# Patient Record
Sex: FEMALE | Race: WHITE | NOT HISPANIC OR LATINO | URBAN - METROPOLITAN AREA
[De-identification: names, ages, dates, MRNs, and addresses within clinical notes are randomized per-mention and may not be internally consistent; named-entity substitution may affect disease eponyms.]

---

## 2021-06-24 ENCOUNTER — INPATIENT (INPATIENT)
Facility: HOSPITAL | Age: 71
LOS: 13 days | Discharge: NURSING, REHAB OR LONG-TERM CARE | DRG: 308 | End: 2021-07-08
Attending: INTERNAL MEDICINE | Admitting: INTERNAL MEDICINE
Payer: MEDICARE

## 2021-06-24 VITALS
HEART RATE: 152 BPM | WEIGHT: 179.9 LBS | RESPIRATION RATE: 18 BRPM | HEIGHT: 69 IN | OXYGEN SATURATION: 98 % | SYSTOLIC BLOOD PRESSURE: 138 MMHG | DIASTOLIC BLOOD PRESSURE: 83 MMHG | TEMPERATURE: 98 F

## 2021-06-24 DIAGNOSIS — Z98.89 OTHER SPECIFIED POSTPROCEDURAL STATES: Chronic | ICD-10-CM

## 2021-06-24 DIAGNOSIS — I48.91 UNSPECIFIED ATRIAL FIBRILLATION: ICD-10-CM

## 2021-06-24 LAB
ALBUMIN SERPL ELPH-MCNC: 3.2 G/DL — LOW (ref 3.3–5)
ALP SERPL-CCNC: 106 U/L — SIGNIFICANT CHANGE UP (ref 40–120)
ALT FLD-CCNC: 27 U/L — SIGNIFICANT CHANGE UP (ref 12–78)
ANION GAP SERPL CALC-SCNC: 15 MMOL/L — SIGNIFICANT CHANGE UP (ref 5–17)
APPEARANCE UR: CLEAR — SIGNIFICANT CHANGE UP
AST SERPL-CCNC: 62 U/L — HIGH (ref 15–37)
BACTERIA # UR AUTO: ABNORMAL
BASOPHILS # BLD AUTO: 0.05 K/UL — SIGNIFICANT CHANGE UP (ref 0–0.2)
BASOPHILS NFR BLD AUTO: 0.5 % — SIGNIFICANT CHANGE UP (ref 0–2)
BILIRUB SERPL-MCNC: 2.4 MG/DL — HIGH (ref 0.2–1.2)
BILIRUB UR-MCNC: NEGATIVE — SIGNIFICANT CHANGE UP
BUN SERPL-MCNC: 11 MG/DL — SIGNIFICANT CHANGE UP (ref 7–23)
CALCIUM SERPL-MCNC: 8.3 MG/DL — LOW (ref 8.5–10.1)
CHLORIDE SERPL-SCNC: 101 MMOL/L — SIGNIFICANT CHANGE UP (ref 96–108)
CK MB CFR SERPL CALC: <1 NG/ML — SIGNIFICANT CHANGE UP (ref 0–3.6)
CO2 SERPL-SCNC: 24 MMOL/L — SIGNIFICANT CHANGE UP (ref 22–31)
COLOR SPEC: YELLOW — SIGNIFICANT CHANGE UP
CREAT SERPL-MCNC: 0.84 MG/DL — SIGNIFICANT CHANGE UP (ref 0.5–1.3)
DIFF PNL FLD: ABNORMAL
EOSINOPHIL # BLD AUTO: 0.01 K/UL — SIGNIFICANT CHANGE UP (ref 0–0.5)
EOSINOPHIL NFR BLD AUTO: 0.1 % — SIGNIFICANT CHANGE UP (ref 0–6)
EPI CELLS # UR: SIGNIFICANT CHANGE UP
GLUCOSE SERPL-MCNC: 124 MG/DL — HIGH (ref 70–99)
GLUCOSE UR QL: NEGATIVE — SIGNIFICANT CHANGE UP
HCT VFR BLD CALC: 39.6 % — SIGNIFICANT CHANGE UP (ref 34.5–45)
HGB BLD-MCNC: 13.1 G/DL — SIGNIFICANT CHANGE UP (ref 11.5–15.5)
IMM GRANULOCYTES NFR BLD AUTO: 1.5 % — SIGNIFICANT CHANGE UP (ref 0–1.5)
KETONES UR-MCNC: ABNORMAL
LEUKOCYTE ESTERASE UR-ACNC: NEGATIVE — SIGNIFICANT CHANGE UP
LYMPHOCYTES # BLD AUTO: 0.76 K/UL — LOW (ref 1–3.3)
LYMPHOCYTES # BLD AUTO: 8 % — LOW (ref 13–44)
MAGNESIUM SERPL-MCNC: 1.6 MG/DL — SIGNIFICANT CHANGE UP (ref 1.6–2.6)
MCHC RBC-ENTMCNC: 32.7 PG — SIGNIFICANT CHANGE UP (ref 27–34)
MCHC RBC-ENTMCNC: 33.1 GM/DL — SIGNIFICANT CHANGE UP (ref 32–36)
MCV RBC AUTO: 98.8 FL — SIGNIFICANT CHANGE UP (ref 80–100)
MONOCYTES # BLD AUTO: 0.66 K/UL — SIGNIFICANT CHANGE UP (ref 0–0.9)
MONOCYTES NFR BLD AUTO: 6.9 % — SIGNIFICANT CHANGE UP (ref 2–14)
NEUTROPHILS # BLD AUTO: 7.89 K/UL — HIGH (ref 1.8–7.4)
NEUTROPHILS NFR BLD AUTO: 83 % — HIGH (ref 43–77)
NITRITE UR-MCNC: NEGATIVE — SIGNIFICANT CHANGE UP
NRBC # BLD: 0 /100 WBCS — SIGNIFICANT CHANGE UP (ref 0–0)
NT-PROBNP SERPL-SCNC: 869 PG/ML — HIGH (ref 0–125)
PH UR: 7 — SIGNIFICANT CHANGE UP (ref 5–8)
PLATELET # BLD AUTO: 214 K/UL — SIGNIFICANT CHANGE UP (ref 150–400)
POTASSIUM SERPL-MCNC: 3.3 MMOL/L — LOW (ref 3.5–5.3)
POTASSIUM SERPL-SCNC: 3.3 MMOL/L — LOW (ref 3.5–5.3)
PROT SERPL-MCNC: 7.3 G/DL — SIGNIFICANT CHANGE UP (ref 6–8.3)
PROT UR-MCNC: NEGATIVE — SIGNIFICANT CHANGE UP
RBC # BLD: 4.01 M/UL — SIGNIFICANT CHANGE UP (ref 3.8–5.2)
RBC # FLD: 15.5 % — HIGH (ref 10.3–14.5)
RBC CASTS # UR COMP ASSIST: ABNORMAL /HPF (ref 0–4)
SARS-COV-2 RNA SPEC QL NAA+PROBE: SIGNIFICANT CHANGE UP
SODIUM SERPL-SCNC: 140 MMOL/L — SIGNIFICANT CHANGE UP (ref 135–145)
SP GR SPEC: 1.01 — SIGNIFICANT CHANGE UP (ref 1.01–1.02)
TROPONIN I SERPL-MCNC: <.015 NG/ML — SIGNIFICANT CHANGE UP (ref 0.01–0.04)
TSH SERPL-MCNC: 2.81 UIU/ML — SIGNIFICANT CHANGE UP (ref 0.36–3.74)
UROBILINOGEN FLD QL: NEGATIVE — SIGNIFICANT CHANGE UP
WBC # BLD: 9.51 K/UL — SIGNIFICANT CHANGE UP (ref 3.8–10.5)
WBC # FLD AUTO: 9.51 K/UL — SIGNIFICANT CHANGE UP (ref 3.8–10.5)
WBC UR QL: SIGNIFICANT CHANGE UP /HPF (ref 0–5)

## 2021-06-24 PROCEDURE — 71045 X-RAY EXAM CHEST 1 VIEW: CPT | Mod: 26

## 2021-06-24 PROCEDURE — 72125 CT NECK SPINE W/O DYE: CPT | Mod: 26,MA

## 2021-06-24 PROCEDURE — 93010 ELECTROCARDIOGRAM REPORT: CPT

## 2021-06-24 PROCEDURE — 71250 CT THORAX DX C-: CPT | Mod: 26,MA

## 2021-06-24 PROCEDURE — 99285 EMERGENCY DEPT VISIT HI MDM: CPT

## 2021-06-24 PROCEDURE — 70486 CT MAXILLOFACIAL W/O DYE: CPT | Mod: 26,MA

## 2021-06-24 PROCEDURE — 70450 CT HEAD/BRAIN W/O DYE: CPT | Mod: 26,MA

## 2021-06-24 RX ORDER — DILTIAZEM HCL 120 MG
15 CAPSULE, EXT RELEASE 24 HR ORAL
Qty: 125 | Refills: 0 | Status: DISCONTINUED | OUTPATIENT
Start: 2021-06-24 | End: 2021-06-25

## 2021-06-24 RX ORDER — DILTIAZEM HCL 120 MG
10 CAPSULE, EXT RELEASE 24 HR ORAL ONCE
Refills: 0 | Status: COMPLETED | OUTPATIENT
Start: 2021-06-24 | End: 2021-06-24

## 2021-06-24 RX ORDER — DILTIAZEM HCL 120 MG
10 CAPSULE, EXT RELEASE 24 HR ORAL
Qty: 125 | Refills: 0 | Status: DISCONTINUED | OUTPATIENT
Start: 2021-06-24 | End: 2021-06-24

## 2021-06-24 RX ORDER — METOPROLOL TARTRATE 50 MG
25 TABLET ORAL EVERY 6 HOURS
Refills: 0 | Status: DISCONTINUED | OUTPATIENT
Start: 2021-06-24 | End: 2021-06-25

## 2021-06-24 RX ORDER — FUROSEMIDE 40 MG
40 TABLET ORAL ONCE
Refills: 0 | Status: COMPLETED | OUTPATIENT
Start: 2021-06-24 | End: 2021-06-24

## 2021-06-24 RX ORDER — DILTIAZEM HCL 120 MG
20 CAPSULE, EXT RELEASE 24 HR ORAL
Qty: 125 | Refills: 0 | Status: DISCONTINUED | OUTPATIENT
Start: 2021-06-24 | End: 2021-06-24

## 2021-06-24 RX ORDER — RIVAROXABAN 15 MG-20MG
20 KIT ORAL
Refills: 0 | Status: DISCONTINUED | OUTPATIENT
Start: 2021-06-24 | End: 2021-07-01

## 2021-06-24 RX ORDER — POTASSIUM CHLORIDE 20 MEQ
40 PACKET (EA) ORAL ONCE
Refills: 0 | Status: COMPLETED | OUTPATIENT
Start: 2021-06-24 | End: 2021-06-24

## 2021-06-24 RX ADMIN — RIVAROXABAN 20 MILLIGRAM(S): KIT at 22:40

## 2021-06-24 RX ADMIN — Medication 10 MG/HR: at 18:33

## 2021-06-24 RX ADMIN — Medication 40 MILLIGRAM(S): at 18:45

## 2021-06-24 RX ADMIN — Medication 15 MG/HR: at 22:16

## 2021-06-24 RX ADMIN — Medication 10 MILLIGRAM(S): at 19:33

## 2021-06-24 RX ADMIN — Medication 10 MILLIGRAM(S): at 18:29

## 2021-06-24 RX ADMIN — Medication 10 MILLIGRAM(S): at 18:51

## 2021-06-24 RX ADMIN — Medication 25 MILLIGRAM(S): at 20:56

## 2021-06-24 RX ADMIN — Medication 20 MG/HR: at 21:25

## 2021-06-24 RX ADMIN — Medication 40 MILLIEQUIVALENT(S): at 20:56

## 2021-06-24 NOTE — CONSULT NOTE ADULT - ASSESSMENT
The patient is a 68 year old female with a history of HTN, HL, DVT, lymphedema, hemochromatosis who was sent in from PMD with rapid atrial fibrillation.     Plan:  - Cardiac enzymes negative  - BNP not significantly elevated at 869  - TSH normal  - Received diltiazem 20 mg IV and started on diltiazem drip with minimal improvement  - Continue diltiazem drip at 10 mg/hr, can increase to 15 mg/hr if remains tachycardic >130  - Start metoprolol tartrate 25 mg q6h  - Continue rivaroxaban 20 mg daily  - Clarify remainder of home medications  - Hold furosemide for now  - Replete K  - Check echo The patient is a 68 year old female with a history of HTN, HL, DVT, lymphedema, hemochromatosis who was sent in from PMD with rapid atrial fibrillation.     Plan:  - Cardiac enzymes negative  - BNP not significantly elevated at 869  - TSH normal  - Received diltiazem 20 mg IV and started on diltiazem drip with minimal improvement  - Continue diltiazem drip at 20 mg/hr  - Start metoprolol tartrate 25 mg q6h  - If she remains tachycardic, can give intermittent pushes of metoprolol or diltiazem  - Continue rivaroxaban 20 mg daily  - Clarify remainder of home medications  - Hold furosemide for now  - Replete K  - Check echo

## 2021-06-24 NOTE — ED PROVIDER NOTE - CLINICAL SUMMARY MEDICAL DECISION MAKING FREE TEXT BOX
new onset rapid afib, f/u labs, chest xray, rate control, call cardio, head trauma on xarelto f/u ct head, admit

## 2021-06-24 NOTE — ED ADULT NURSE NOTE - OBJECTIVE STATEMENT
Pt from home s/p fall two days ago, pt has bilateral black eyes and bruise to left frontal forehead, pt went to PMD and was sent to ED for new onset rapid afib, pt is obese, Bilateral feet and LL = pedal edema +2 with vascular congestion, redness, scaly skin, pt started on cardizem drip, lab work sent awaiting results

## 2021-06-24 NOTE — ED PROVIDER NOTE - OBJECTIVE STATEMENT
71 female sent to ER from Dr. Darcie Sotomayor office with report of new onset rapid afib. Patient states she has been feeling dizziness, nausea for more than a month, and today had appointment to see Dr. Sotomayor. Patient states 2 days ago she was fixing her pill box and leaned forward and hit head on counter, no LOC, no syncope. Patient has chronic lymphedema of bilateral lower extremites, has had history of DVT of lower extremity on xarelto.

## 2021-06-24 NOTE — ED ADULT NURSE REASSESSMENT NOTE - NS ED NURSE REASSESS COMMENT FT1
Received report from Karma HENAO. Patient awake and resting in bed. IVP Cardizem not given yet and to be given. MD Nelson aware of current HR of 155. As per MD Nelson patient may go to CT Head on cardiac monitor with RN bedside. Safety and comfort measures provided and maintained. Friend bedside.

## 2021-06-24 NOTE — ED ADULT NURSE NOTE - PMH
CHF (congestive heart failure)    Hemochromatosis    HLD (hyperlipidemia)    HTN (hypertension)    HTN (hypertension), benign    Pulmonary embolism

## 2021-06-25 DIAGNOSIS — I48.91 UNSPECIFIED ATRIAL FIBRILLATION: ICD-10-CM

## 2021-06-25 DIAGNOSIS — I26.99 OTHER PULMONARY EMBOLISM WITHOUT ACUTE COR PULMONALE: ICD-10-CM

## 2021-06-25 DIAGNOSIS — E87.6 HYPOKALEMIA: ICD-10-CM

## 2021-06-25 DIAGNOSIS — I10 ESSENTIAL (PRIMARY) HYPERTENSION: ICD-10-CM

## 2021-06-25 DIAGNOSIS — E78.5 HYPERLIPIDEMIA, UNSPECIFIED: ICD-10-CM

## 2021-06-25 LAB
ANION GAP SERPL CALC-SCNC: 14 MMOL/L — SIGNIFICANT CHANGE UP (ref 5–17)
BUN SERPL-MCNC: 10 MG/DL — SIGNIFICANT CHANGE UP (ref 7–23)
CALCIUM SERPL-MCNC: 7.8 MG/DL — LOW (ref 8.5–10.1)
CHLORIDE SERPL-SCNC: 103 MMOL/L — SIGNIFICANT CHANGE UP (ref 96–108)
CK SERPL-CCNC: 162 U/L — SIGNIFICANT CHANGE UP (ref 26–192)
CO2 SERPL-SCNC: 27 MMOL/L — SIGNIFICANT CHANGE UP (ref 22–31)
CREAT SERPL-MCNC: 0.61 MG/DL — SIGNIFICANT CHANGE UP (ref 0.5–1.3)
GLUCOSE SERPL-MCNC: 96 MG/DL — SIGNIFICANT CHANGE UP (ref 70–99)
HCT VFR BLD CALC: 32.8 % — LOW (ref 34.5–45)
HGB BLD-MCNC: 11.1 G/DL — LOW (ref 11.5–15.5)
MAGNESIUM SERPL-MCNC: 1.5 MG/DL — LOW (ref 1.6–2.6)
MCHC RBC-ENTMCNC: 33.8 GM/DL — SIGNIFICANT CHANGE UP (ref 32–36)
MCHC RBC-ENTMCNC: 33.9 PG — SIGNIFICANT CHANGE UP (ref 27–34)
MCV RBC AUTO: 100.3 FL — HIGH (ref 80–100)
NRBC # BLD: 0 /100 WBCS — SIGNIFICANT CHANGE UP (ref 0–0)
PLATELET # BLD AUTO: 190 K/UL — SIGNIFICANT CHANGE UP (ref 150–400)
POTASSIUM SERPL-MCNC: 2.5 MMOL/L — CRITICAL LOW (ref 3.5–5.3)
POTASSIUM SERPL-SCNC: 2.5 MMOL/L — CRITICAL LOW (ref 3.5–5.3)
RBC # BLD: 3.27 M/UL — LOW (ref 3.8–5.2)
RBC # FLD: 15.6 % — HIGH (ref 10.3–14.5)
SODIUM SERPL-SCNC: 144 MMOL/L — SIGNIFICANT CHANGE UP (ref 135–145)
TROPONIN I SERPL-MCNC: <.015 NG/ML — SIGNIFICANT CHANGE UP (ref 0.01–0.04)
WBC # BLD: 6.12 K/UL — SIGNIFICANT CHANGE UP (ref 3.8–10.5)
WBC # FLD AUTO: 6.12 K/UL — SIGNIFICANT CHANGE UP (ref 3.8–10.5)

## 2021-06-25 RX ORDER — DILTIAZEM HCL 120 MG
5 CAPSULE, EXT RELEASE 24 HR ORAL
Qty: 125 | Refills: 0 | Status: DISCONTINUED | OUTPATIENT
Start: 2021-06-25 | End: 2021-06-25

## 2021-06-25 RX ORDER — POTASSIUM CHLORIDE 20 MEQ
40 PACKET (EA) ORAL EVERY 4 HOURS
Refills: 0 | Status: COMPLETED | OUTPATIENT
Start: 2021-06-25 | End: 2021-06-25

## 2021-06-25 RX ORDER — METOPROLOL TARTRATE 50 MG
100 TABLET ORAL
Refills: 0 | Status: DISCONTINUED | OUTPATIENT
Start: 2021-06-26 | End: 2021-07-08

## 2021-06-25 RX ORDER — POTASSIUM CHLORIDE 20 MEQ
10 PACKET (EA) ORAL
Refills: 0 | Status: COMPLETED | OUTPATIENT
Start: 2021-06-25 | End: 2021-06-25

## 2021-06-25 RX ORDER — RIVAROXABAN 15 MG-20MG
1 KIT ORAL
Qty: 0 | Refills: 0 | DISCHARGE

## 2021-06-25 RX ORDER — DILTIAZEM HCL 120 MG
10 CAPSULE, EXT RELEASE 24 HR ORAL
Qty: 125 | Refills: 0 | Status: DISCONTINUED | OUTPATIENT
Start: 2021-06-25 | End: 2021-06-25

## 2021-06-25 RX ORDER — METOPROLOL TARTRATE 50 MG
50 TABLET ORAL ONCE
Refills: 0 | Status: COMPLETED | OUTPATIENT
Start: 2021-06-25 | End: 2021-06-25

## 2021-06-25 RX ORDER — DILTIAZEM HCL 120 MG
120 CAPSULE, EXT RELEASE 24 HR ORAL DAILY
Refills: 0 | Status: DISCONTINUED | OUTPATIENT
Start: 2021-06-25 | End: 2021-06-26

## 2021-06-25 RX ORDER — MAGNESIUM SULFATE 500 MG/ML
2 VIAL (ML) INJECTION ONCE
Refills: 0 | Status: COMPLETED | OUTPATIENT
Start: 2021-06-25 | End: 2021-06-25

## 2021-06-25 RX ORDER — METOPROLOL TARTRATE 50 MG
50 TABLET ORAL
Refills: 0 | Status: DISCONTINUED | OUTPATIENT
Start: 2021-06-25 | End: 2021-06-25

## 2021-06-25 RX ADMIN — Medication 50 MILLIGRAM(S): at 07:50

## 2021-06-25 RX ADMIN — Medication 120 MILLIGRAM(S): at 07:50

## 2021-06-25 RX ADMIN — Medication 50 MILLIGRAM(S): at 17:24

## 2021-06-25 RX ADMIN — Medication 25 MILLIGRAM(S): at 06:14

## 2021-06-25 RX ADMIN — Medication 5 MG/HR: at 02:41

## 2021-06-25 RX ADMIN — Medication 40 MILLIEQUIVALENT(S): at 09:19

## 2021-06-25 RX ADMIN — Medication 25 MILLIGRAM(S): at 00:40

## 2021-06-25 RX ADMIN — Medication 100 MILLIEQUIVALENT(S): at 08:38

## 2021-06-25 RX ADMIN — Medication 100 MILLIEQUIVALENT(S): at 12:03

## 2021-06-25 RX ADMIN — Medication 50 GRAM(S): at 13:59

## 2021-06-25 RX ADMIN — Medication 40 MILLIEQUIVALENT(S): at 13:28

## 2021-06-25 RX ADMIN — Medication 50 MILLIGRAM(S): at 19:22

## 2021-06-25 RX ADMIN — Medication 10 MG/HR: at 02:17

## 2021-06-25 RX ADMIN — RIVAROXABAN 20 MILLIGRAM(S): KIT at 17:24

## 2021-06-25 RX ADMIN — Medication 100 MILLIEQUIVALENT(S): at 10:01

## 2021-06-25 NOTE — H&P ADULT - HISTORY OF PRESENT ILLNESS
The patient is a 68 year old female with a history of HTN, HL, DVT, lymphedema, hemochromatosis who was sent in from [facility] with rapid atrial fibrillation. She states over the last couple of months she has felt lightheaded and nauseous at times. No palpitations, chest pain, shortness of breath. She is able to complete exercises at home. She has not seen her cardiologist Dr. Gonzalez in over a year. No recent cardiac testing.

## 2021-06-25 NOTE — H&P ADULT - NSICDXPASTMEDICALHX_GEN_ALL_CORE_FT
PAST MEDICAL HISTORY:  CHF (congestive heart failure)     Hemochromatosis     HLD (hyperlipidemia)     HTN (hypertension)     HTN (hypertension), benign     Pulmonary embolism

## 2021-06-25 NOTE — PROGRESS NOTE ADULT - SUBJECTIVE AND OBJECTIVE BOX
Chief Complaint: Tachycardia    Interval Events: Heart rates improved overnight. Patient has no complaints.    Review of Systems:  General: No fevers, chills, weight loss or gain  Skin: No rashes, color changes  Cardiovascular: No chest pain, orthopnea  Respiratory: No shortness of breath, cough  Gastrointestinal: No nausea, abdominal pain  Genitourinary: No incontinence, pain with urination  Musculoskeletal: No pain, swelling, decreased range of motion  Neurological: No headache, weakness  Psychiatric: No depression, anxiety  Endocrine: No weight loss or gain, increased thirst  All other systems are comprehensively negative.    Physical Exam:  Vitals:        Vital Signs Last 24 Hrs  T(C): 36.9 (25 Jun 2021 04:15), Max: 37.1 (24 Jun 2021 23:21)  T(F): 98.4 (25 Jun 2021 04:15), Max: 98.8 (24 Jun 2021 23:21)  HR: 82 (25 Jun 2021 04:15) (82 - 164)  BP: 137/83 (25 Jun 2021 04:15) (94/65 - 143/92)  BP(mean): --  RR: 18 (25 Jun 2021 04:15) (18 - 20)  SpO2: 98% (25 Jun 2021 04:15) (98% - 100%)  General: NAD  HEENT: MMM  Neck: No JVD, no carotid bruit  Lungs: CTAB  CV: Irregular, nl S1/S2, no M/R/G  Abdomen: S/NT/ND, +BS  Extremities: No LE edema, no cyanosis  Neuro: AAOx3, non-focal  Skin: No rash    Labs:                        11.1   6.12  )-----------( 190      ( 25 Jun 2021 06:59 )             32.8     06-24    140  |  101  |  11  ----------------------------<  124<H>  3.3<L>   |  24  |  0.84    Ca    8.3<L>      24 Jun 2021 18:23  Mg     1.6     06-24    TPro  7.3  /  Alb  3.2<L>  /  TBili  2.4<H>  /  DBili  x   /  AST  62<H>  /  ALT  27  /  AlkPhos  106  06-24    CARDIAC MARKERS ( 24 Jun 2021 18:23 )  <.015 ng/mL / x     / x     / x     / <1.0 ng/mL          Telemetry:

## 2021-06-25 NOTE — PROVIDER CONTACT NOTE (OTHER) - SITUATION
Pt admitted with a fib, was on cardizem drip this am, d/cd and started on po cardizem and po metoprolol.  Heart rate increasing to 120's to 130's.  Pt denies any c/o chest pain or SOB, denies

## 2021-06-25 NOTE — H&P ADULT - NSICDXFAMILYHX_GEN_ALL_CORE_FT
FAMILY HISTORY:  Father  Still living? Unknown  Family history of colon cancer, Age at diagnosis: Age Unknown    Mother  Still living? Unknown  Family history of cerebrovascular accident (CVA), Age at diagnosis: Age Unknown    Sibling  Still living? Unknown  Family history of hemochromatosis, Age at diagnosis: Age Unknown

## 2021-06-25 NOTE — H&P ADULT - PROBLEM SELECTOR PLAN 1
Admit to telemetry  Patient titrated of iv cardizem gtt this morning to po cardizem  Started on po metoprolol  Check ECHO  Continue Xarelto  Cardio consult  Further work-up/management pending clinical course.

## 2021-06-25 NOTE — H&P ADULT - NSICDXPASTSURGICALHX_GEN_ALL_CORE_FT
PAST SURGICAL HISTORY:  No significant past surgical history     S/P D&C (status post dilation and curettage) with IUD insertion in 2013

## 2021-06-25 NOTE — PROVIDER CONTACT NOTE (CRITICAL VALUE NOTIFICATION) - ASSESSMENT
Pt is alert and oriented, skin warm and dry.  cardiac monitor a fib.  Pt ws on cardizem drip, bethanycd and now on po cardizem and metoprolol

## 2021-06-26 DIAGNOSIS — N39.0 URINARY TRACT INFECTION, SITE NOT SPECIFIED: ICD-10-CM

## 2021-06-26 DIAGNOSIS — R41.82 ALTERED MENTAL STATUS, UNSPECIFIED: ICD-10-CM

## 2021-06-26 LAB
ALBUMIN SERPL ELPH-MCNC: 3.2 G/DL — LOW (ref 3.3–5)
ALP SERPL-CCNC: 97 U/L — SIGNIFICANT CHANGE UP (ref 40–120)
ALT FLD-CCNC: 23 U/L — SIGNIFICANT CHANGE UP (ref 12–78)
ANION GAP SERPL CALC-SCNC: 10 MMOL/L — SIGNIFICANT CHANGE UP (ref 5–17)
ANION GAP SERPL CALC-SCNC: 9 MMOL/L — SIGNIFICANT CHANGE UP (ref 5–17)
AST SERPL-CCNC: 37 U/L — SIGNIFICANT CHANGE UP (ref 15–37)
BILIRUB DIRECT SERPL-MCNC: 0.3 MG/DL — HIGH (ref 0.05–0.2)
BILIRUB INDIRECT FLD-MCNC: 0.4 MG/DL — SIGNIFICANT CHANGE UP (ref 0.2–1)
BILIRUB SERPL-MCNC: 0.7 MG/DL — SIGNIFICANT CHANGE UP (ref 0.2–1.2)
BUN SERPL-MCNC: 11 MG/DL — SIGNIFICANT CHANGE UP (ref 7–23)
BUN SERPL-MCNC: 12 MG/DL — SIGNIFICANT CHANGE UP (ref 7–23)
CALCIUM SERPL-MCNC: 8.2 MG/DL — LOW (ref 8.5–10.1)
CALCIUM SERPL-MCNC: 8.5 MG/DL — SIGNIFICANT CHANGE UP (ref 8.5–10.1)
CHLORIDE SERPL-SCNC: 105 MMOL/L — SIGNIFICANT CHANGE UP (ref 96–108)
CHLORIDE SERPL-SCNC: 106 MMOL/L — SIGNIFICANT CHANGE UP (ref 96–108)
CO2 SERPL-SCNC: 29 MMOL/L — SIGNIFICANT CHANGE UP (ref 22–31)
CO2 SERPL-SCNC: 30 MMOL/L — SIGNIFICANT CHANGE UP (ref 22–31)
COVID-19 SPIKE DOMAIN AB INTERP: NEGATIVE — SIGNIFICANT CHANGE UP
COVID-19 SPIKE DOMAIN ANTIBODY RESULT: 0.4 U/ML — SIGNIFICANT CHANGE UP
CREAT SERPL-MCNC: 0.71 MG/DL — SIGNIFICANT CHANGE UP (ref 0.5–1.3)
CREAT SERPL-MCNC: 0.73 MG/DL — SIGNIFICANT CHANGE UP (ref 0.5–1.3)
ETHANOL SERPL-MCNC: <10 MG/DL — SIGNIFICANT CHANGE UP (ref 0–10)
GLUCOSE SERPL-MCNC: 113 MG/DL — HIGH (ref 70–99)
GLUCOSE SERPL-MCNC: 119 MG/DL — HIGH (ref 70–99)
HCT VFR BLD CALC: 36.5 % — SIGNIFICANT CHANGE UP (ref 34.5–45)
HCV AB S/CO SERPL IA: 0.14 S/CO — SIGNIFICANT CHANGE UP (ref 0–0.99)
HCV AB SERPL-IMP: SIGNIFICANT CHANGE UP
HGB BLD-MCNC: 12 G/DL — SIGNIFICANT CHANGE UP (ref 11.5–15.5)
MAGNESIUM SERPL-MCNC: 1.6 MG/DL — SIGNIFICANT CHANGE UP (ref 1.6–2.6)
MAGNESIUM SERPL-MCNC: 1.7 MG/DL — SIGNIFICANT CHANGE UP (ref 1.6–2.6)
MCHC RBC-ENTMCNC: 32.9 GM/DL — SIGNIFICANT CHANGE UP (ref 32–36)
MCHC RBC-ENTMCNC: 33.5 PG — SIGNIFICANT CHANGE UP (ref 27–34)
MCV RBC AUTO: 102 FL — HIGH (ref 80–100)
NRBC # BLD: 0 /100 WBCS — SIGNIFICANT CHANGE UP (ref 0–0)
PCP SPEC-MCNC: SIGNIFICANT CHANGE UP
PHOSPHATE SERPL-MCNC: 3 MG/DL — SIGNIFICANT CHANGE UP (ref 2.5–4.5)
PLATELET # BLD AUTO: 229 K/UL — SIGNIFICANT CHANGE UP (ref 150–400)
POTASSIUM SERPL-MCNC: 3 MMOL/L — LOW (ref 3.5–5.3)
POTASSIUM SERPL-MCNC: 3.4 MMOL/L — LOW (ref 3.5–5.3)
POTASSIUM SERPL-SCNC: 3 MMOL/L — LOW (ref 3.5–5.3)
POTASSIUM SERPL-SCNC: 3.4 MMOL/L — LOW (ref 3.5–5.3)
PROT SERPL-MCNC: 6.6 G/DL — SIGNIFICANT CHANGE UP (ref 6–8.3)
RBC # BLD: 3.58 M/UL — LOW (ref 3.8–5.2)
RBC # FLD: 16.1 % — HIGH (ref 10.3–14.5)
SARS-COV-2 IGG+IGM SERPL QL IA: 0.4 U/ML — SIGNIFICANT CHANGE UP
SARS-COV-2 IGG+IGM SERPL QL IA: NEGATIVE — SIGNIFICANT CHANGE UP
SODIUM SERPL-SCNC: 144 MMOL/L — SIGNIFICANT CHANGE UP (ref 135–145)
SODIUM SERPL-SCNC: 145 MMOL/L — SIGNIFICANT CHANGE UP (ref 135–145)
WBC # BLD: 7.63 K/UL — SIGNIFICANT CHANGE UP (ref 3.8–10.5)
WBC # FLD AUTO: 7.63 K/UL — SIGNIFICANT CHANGE UP (ref 3.8–10.5)

## 2021-06-26 PROCEDURE — 70450 CT HEAD/BRAIN W/O DYE: CPT | Mod: 26

## 2021-06-26 RX ORDER — FOLIC ACID 0.8 MG
1 TABLET ORAL DAILY
Refills: 0 | Status: DISCONTINUED | OUTPATIENT
Start: 2021-06-26 | End: 2021-07-08

## 2021-06-26 RX ORDER — OLANZAPINE 15 MG/1
2.5 TABLET, FILM COATED ORAL EVERY 6 HOURS
Refills: 0 | Status: DISCONTINUED | OUTPATIENT
Start: 2021-06-26 | End: 2021-06-26

## 2021-06-26 RX ORDER — OLANZAPINE 15 MG/1
2.5 TABLET, FILM COATED ORAL EVERY 6 HOURS
Refills: 0 | Status: DISCONTINUED | OUTPATIENT
Start: 2021-06-26 | End: 2021-06-29

## 2021-06-26 RX ORDER — POTASSIUM CHLORIDE 20 MEQ
40 PACKET (EA) ORAL EVERY 4 HOURS
Refills: 0 | Status: COMPLETED | OUTPATIENT
Start: 2021-06-26 | End: 2021-06-26

## 2021-06-26 RX ORDER — DILTIAZEM HCL 120 MG
120 CAPSULE, EXT RELEASE 24 HR ORAL ONCE
Refills: 0 | Status: COMPLETED | OUTPATIENT
Start: 2021-06-26 | End: 2021-06-26

## 2021-06-26 RX ORDER — DILTIAZEM HCL 120 MG
240 CAPSULE, EXT RELEASE 24 HR ORAL DAILY
Refills: 0 | Status: DISCONTINUED | OUTPATIENT
Start: 2021-06-27 | End: 2021-06-27

## 2021-06-26 RX ORDER — CEFTRIAXONE 500 MG/1
1000 INJECTION, POWDER, FOR SOLUTION INTRAMUSCULAR; INTRAVENOUS EVERY 24 HOURS
Refills: 0 | Status: COMPLETED | OUTPATIENT
Start: 2021-06-27 | End: 2021-06-29

## 2021-06-26 RX ORDER — THIAMINE MONONITRATE (VIT B1) 100 MG
100 TABLET ORAL DAILY
Refills: 0 | Status: COMPLETED | OUTPATIENT
Start: 2021-06-26 | End: 2021-06-29

## 2021-06-26 RX ORDER — CEFTRIAXONE 500 MG/1
INJECTION, POWDER, FOR SOLUTION INTRAMUSCULAR; INTRAVENOUS
Refills: 0 | Status: COMPLETED | OUTPATIENT
Start: 2021-06-26 | End: 2021-06-30

## 2021-06-26 RX ORDER — SOD,AMMONIUM,POTASSIUM LACTATE
1 CREAM (GRAM) TOPICAL
Refills: 0 | Status: DISCONTINUED | OUTPATIENT
Start: 2021-06-26 | End: 2021-07-08

## 2021-06-26 RX ORDER — CEFTRIAXONE 500 MG/1
1000 INJECTION, POWDER, FOR SOLUTION INTRAMUSCULAR; INTRAVENOUS ONCE
Refills: 0 | Status: COMPLETED | OUTPATIENT
Start: 2021-06-26 | End: 2021-06-26

## 2021-06-26 RX ORDER — DILTIAZEM HCL 120 MG
10 CAPSULE, EXT RELEASE 24 HR ORAL ONCE
Refills: 0 | Status: COMPLETED | OUTPATIENT
Start: 2021-06-26 | End: 2021-06-26

## 2021-06-26 RX ADMIN — Medication 100 MILLIGRAM(S): at 19:00

## 2021-06-26 RX ADMIN — Medication 40 MILLIEQUIVALENT(S): at 14:39

## 2021-06-26 RX ADMIN — Medication 120 MILLIGRAM(S): at 10:23

## 2021-06-26 RX ADMIN — RIVAROXABAN 20 MILLIGRAM(S): KIT at 19:00

## 2021-06-26 RX ADMIN — Medication 10 MILLIGRAM(S): at 14:39

## 2021-06-26 RX ADMIN — CEFTRIAXONE 100 MILLIGRAM(S): 500 INJECTION, POWDER, FOR SOLUTION INTRAMUSCULAR; INTRAVENOUS at 09:54

## 2021-06-26 RX ADMIN — Medication 120 MILLIGRAM(S): at 05:12

## 2021-06-26 RX ADMIN — Medication 40 MILLIEQUIVALENT(S): at 19:03

## 2021-06-26 RX ADMIN — Medication 100 MILLIGRAM(S): at 05:12

## 2021-06-26 RX ADMIN — Medication 1 MILLIGRAM(S): at 16:56

## 2021-06-26 RX ADMIN — OLANZAPINE 2.5 MILLIGRAM(S): 15 TABLET, FILM COATED ORAL at 23:21

## 2021-06-26 RX ADMIN — Medication 1 APPLICATION(S): at 19:03

## 2021-06-26 NOTE — PROGRESS NOTE ADULT - SUBJECTIVE AND OBJECTIVE BOX
Patient is a 71y old  Female who presents with a chief complaint of Dizzy (2021 10:31)      INTERVAL HPI/OVERNIGHT EVENTS: Patient seen and examined. NAD. Events noted.    Vital Signs Last 24 Hrs  T(C): 36.6 (2021 07:56), Max: 37.2 (2021 00:22)  T(F): 97.9 (2021 07:56), Max: 99 (2021 00:22)  HR: 99 (2021 07:56) (95 - 122)  BP: 133/90 (2021 07:56) (111/74 - 133/90)  BP(mean): --  RR: 18 (2021 07:56) (16 - 18)  SpO2: 95% (2021 07:56) (92% - 96%)        144  |  105  |  11  ----------------------------<  113<H>  3.0<L>   |  30  |  0.71    Ca    8.2<L>      2021 07:23  Mg     1.7         TPro  7.3  /  Alb  3.2<L>  /  TBili  2.4<H>  /  DBili  x   /  AST  62<H>  /  ALT  27  /  AlkPhos  106  -24                          12.0   7.63  )-----------( 229      ( 2021 07:23 )             36.5       CAPILLARY BLOOD GLUCOSE        Urinalysis Basic - ( 2021 19:30 )    Color: Yellow / Appearance: Clear / S.010 / pH: x  Gluc: x / Ketone: Moderate  / Bili: Negative / Urobili: Negative   Blood: x / Protein: Negative / Nitrite: Negative   Leuk Esterase: Negative / RBC: 3-5 /HPF / WBC 8-12 /HPF   Sq Epi: x / Non Sq Epi: Few / Bacteria: Few              cefTRIAXone   IVPB      metoprolol tartrate 100 milliGRAM(s) Oral two times a day  potassium chloride    Tablet ER 40 milliEquivalent(s) Oral every 4 hours  rivaroxaban 20 milliGRAM(s) Oral with dinner              REVIEW OF SYSTEMS:  CONSTITUTIONAL: No fever, no weight loss, or no fatigue  NECK: No pain, no stiffness  RESPIRATORY: No cough, no wheezing, no chills, no hemoptysis, No shortness of breath  CARDIOVASCULAR: No chest pain, no palpitations, no dizziness, no leg swelling  GASTROINTESTINAL: No abdominal pain. No nausea, no vomiting, no hematemesis; No diarrhea, no constipation. No melena, no hematochezia.  GENITOURINARY: No dysuria, no frequency, no hematuria, no incontinence  NEUROLOGICAL: No headaches, no loss of strength, no numbness, no tremors  SKIN: No itching, no burning  MUSCULOSKELETAL: No joint pain, no swelling; No muscle, no back, no extremity pain  PSYCHIATRIC: No depression, no mood swings,   HEME/LYMPH: No easy bruising, no bleeding gums  ALLERY AND IMMUNOLOGIC: No hives       Consultant(s) Notes Reviewed:  [X] YES  [ ] NO    PHYSICAL EXAM:  GENERAL: NAD  HEAD:  Atraumatic, Normocephalic  EYES: EOMI, PERRLA, conjunctiva and sclera clear  ENMT: No tonsillar erythema, exudates, or enlargement; Moist mucous membranes  NECK: Supple, No JVD  NERVOUS SYSTEM:  Awake & alert  CHEST/LUNG: Clear to auscultation bilaterally; No rales, rhonchi, wheezing,  HEART: IRRegular rate and rhythm  ABDOMEN: Soft, Nontender, Nondistended; Bowel sounds present  EXTREMITIES:  No clubbing, cyanosis, or edema  LYMPH: No lymphadenopathy noted  SKIN: No rashes      Advanced care planning discussed with patient/family [X] YES   [ ] NO    Advanced care planning discussed with patient/family. Patient's health status was discussed. All appropriate changes have been made regarding patient's end-of-life care. Advanced care planning forms reviewed/discussed/completed.  20 minutes spent.
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
## 2021-06-26 NOTE — PROGRESS NOTE ADULT - SUBJECTIVE AND OBJECTIVE BOX
Chief Complaint: Tachycardia    Interval Events: Confused overnight.    Review of Systems:  General: No fevers, chills, weight loss or gain  Skin: No rashes, color changes  Cardiovascular: No chest pain, orthopnea  Respiratory: No shortness of breath, cough  Gastrointestinal: No nausea, abdominal pain  Genitourinary: No incontinence, pain with urination  Musculoskeletal: No pain, swelling, decreased range of motion  Neurological: No headache, weakness  Psychiatric: No depression, anxiety  Endocrine: No weight loss or gain, increased thirst  All other systems are comprehensively negative.    Physical Exam:  Vitals:        Vital Signs Last 24 Hrs  T(C): 36.9 (25 Jun 2021 04:15), Max: 37.1 (24 Jun 2021 23:21)  T(F): 98.4 (25 Jun 2021 04:15), Max: 98.8 (24 Jun 2021 23:21)  HR: 82 (25 Jun 2021 04:15) (82 - 164)  BP: 137/83 (25 Jun 2021 04:15) (94/65 - 143/92)  BP(mean): --  RR: 18 (25 Jun 2021 04:15) (18 - 20)  SpO2: 98% (25 Jun 2021 04:15) (98% - 100%)  General: NAD  HEENT: MMM  Neck: No JVD, no carotid bruit  Lungs: CTAB  CV: Irregular, nl S1/S2, no M/R/G  Abdomen: S/NT/ND, +BS  Extremities: No LE edema, no cyanosis  Neuro: AAOx3, non-focal  Skin: No rash    Labs:                        11.1   6.12  )-----------( 190      ( 25 Jun 2021 06:59 )             32.8     06-24    140  |  101  |  11  ----------------------------<  124<H>  3.3<L>   |  24  |  0.84    Ca    8.3<L>      24 Jun 2021 18:23  Mg     1.6     06-24    TPro  7.3  /  Alb  3.2<L>  /  TBili  2.4<H>  /  DBili  x   /  AST  62<H>  /  ALT  27  /  AlkPhos  106  06-24    CARDIAC MARKERS ( 24 Jun 2021 18:23 )  <.015 ng/mL / x     / x     / x     / <1.0 ng/mL          Telemetry: AF 120s

## 2021-06-26 NOTE — PROGRESS NOTE ADULT - PROBLEM SELECTOR PLAN 1
Metoprolol and po cardizem dose increased  ECHO noted  Continue Xarelto  Cardio consult noted  Further work-up/management pending clinical course.

## 2021-06-27 DIAGNOSIS — E83.119 HEMOCHROMATOSIS, UNSPECIFIED: ICD-10-CM

## 2021-06-27 LAB
-  AMIKACIN: SIGNIFICANT CHANGE UP
-  AMOXICILLIN/CLAVULANIC ACID: SIGNIFICANT CHANGE UP
-  AMPICILLIN/SULBACTAM: SIGNIFICANT CHANGE UP
-  AMPICILLIN: SIGNIFICANT CHANGE UP
-  AZTREONAM: SIGNIFICANT CHANGE UP
-  CEFAZOLIN: SIGNIFICANT CHANGE UP
-  CEFEPIME: SIGNIFICANT CHANGE UP
-  CEFOXITIN: SIGNIFICANT CHANGE UP
-  CEFTRIAXONE: SIGNIFICANT CHANGE UP
-  CIPROFLOXACIN: SIGNIFICANT CHANGE UP
-  ERTAPENEM: SIGNIFICANT CHANGE UP
-  GENTAMICIN: SIGNIFICANT CHANGE UP
-  IMIPENEM: SIGNIFICANT CHANGE UP
-  LEVOFLOXACIN: SIGNIFICANT CHANGE UP
-  MEROPENEM: SIGNIFICANT CHANGE UP
-  NITROFURANTOIN: SIGNIFICANT CHANGE UP
-  PIPERACILLIN/TAZOBACTAM: SIGNIFICANT CHANGE UP
-  TIGECYCLINE: SIGNIFICANT CHANGE UP
-  TOBRAMYCIN: SIGNIFICANT CHANGE UP
-  TRIMETHOPRIM/SULFAMETHOXAZOLE: SIGNIFICANT CHANGE UP
AMMONIA BLD-MCNC: 36 UMOL/L — HIGH (ref 11–32)
AMMONIA BLD-MCNC: <17 UMOL/L — SIGNIFICANT CHANGE UP (ref 11–32)
ANION GAP SERPL CALC-SCNC: 9 MMOL/L — SIGNIFICANT CHANGE UP (ref 5–17)
BASE EXCESS BLDV CALC-SCNC: 5.4 MMOL/L — HIGH (ref -2–2)
BLOOD GAS COMMENTS, VENOUS: SIGNIFICANT CHANGE UP
BLOOD GAS COMMENTS, VENOUS: SIGNIFICANT CHANGE UP
BUN SERPL-MCNC: 11 MG/DL — SIGNIFICANT CHANGE UP (ref 7–23)
CALCIUM SERPL-MCNC: 8.4 MG/DL — LOW (ref 8.5–10.1)
CHLORIDE SERPL-SCNC: 108 MMOL/L — SIGNIFICANT CHANGE UP (ref 96–108)
CO2 SERPL-SCNC: 29 MMOL/L — SIGNIFICANT CHANGE UP (ref 22–31)
CREAT SERPL-MCNC: 0.58 MG/DL — SIGNIFICANT CHANGE UP (ref 0.5–1.3)
CULTURE RESULTS: SIGNIFICANT CHANGE UP
FERRITIN SERPL-MCNC: 165 NG/ML — HIGH (ref 15–150)
FOLATE SERPL-MCNC: 4.7 NG/ML — SIGNIFICANT CHANGE UP
GLUCOSE SERPL-MCNC: 98 MG/DL — SIGNIFICANT CHANGE UP (ref 70–99)
HCO3 BLDV-SCNC: 28 MMOL/L — SIGNIFICANT CHANGE UP (ref 21–29)
HCT VFR BLD CALC: 35.4 % — SIGNIFICANT CHANGE UP (ref 34.5–45)
HGB BLD-MCNC: 11.5 G/DL — SIGNIFICANT CHANGE UP (ref 11.5–15.5)
HIV 1+2 AB+HIV1 P24 AG SERPL QL IA: SIGNIFICANT CHANGE UP
HOROWITZ INDEX BLDV+IHG-RTO: 21 — SIGNIFICANT CHANGE UP
MAGNESIUM SERPL-MCNC: 1.7 MG/DL — SIGNIFICANT CHANGE UP (ref 1.6–2.6)
MCHC RBC-ENTMCNC: 32.5 GM/DL — SIGNIFICANT CHANGE UP (ref 32–36)
MCHC RBC-ENTMCNC: 33.4 PG — SIGNIFICANT CHANGE UP (ref 27–34)
MCV RBC AUTO: 102.9 FL — HIGH (ref 80–100)
METHOD TYPE: SIGNIFICANT CHANGE UP
NRBC # BLD: 0 /100 WBCS — SIGNIFICANT CHANGE UP (ref 0–0)
ORGANISM # SPEC MICROSCOPIC CNT: SIGNIFICANT CHANGE UP
ORGANISM # SPEC MICROSCOPIC CNT: SIGNIFICANT CHANGE UP
PCO2 BLDV: 53 MMHG — HIGH (ref 35–50)
PH BLDV: 7.38 — SIGNIFICANT CHANGE UP (ref 7.35–7.45)
PHOSPHATE SERPL-MCNC: 3.3 MG/DL — SIGNIFICANT CHANGE UP (ref 2.5–4.5)
PLATELET # BLD AUTO: 214 K/UL — SIGNIFICANT CHANGE UP (ref 150–400)
PO2 BLDV: 55 MMHG — HIGH (ref 25–45)
POTASSIUM SERPL-MCNC: 3 MMOL/L — LOW (ref 3.5–5.3)
POTASSIUM SERPL-SCNC: 3 MMOL/L — LOW (ref 3.5–5.3)
PROCALCITONIN SERPL-MCNC: 0.06 NG/ML — HIGH (ref 0–0.04)
RBC # BLD: 3.44 M/UL — LOW (ref 3.8–5.2)
RBC # FLD: 16.1 % — HIGH (ref 10.3–14.5)
SAO2 % BLDV: 83 % — SIGNIFICANT CHANGE UP (ref 67–88)
SODIUM SERPL-SCNC: 146 MMOL/L — HIGH (ref 135–145)
SPECIMEN SOURCE: SIGNIFICANT CHANGE UP
TRANSFERRIN SERPL-MCNC: 168 MG/DL — LOW (ref 200–360)
TSH SERPL-MCNC: 3.22 UIU/ML — SIGNIFICANT CHANGE UP (ref 0.36–3.74)
TSH SERPL-MCNC: 4.28 UIU/ML — HIGH (ref 0.36–3.74)
VIT B12 SERPL-MCNC: 639 PG/ML — SIGNIFICANT CHANGE UP (ref 232–1245)
WBC # BLD: 7.49 K/UL — SIGNIFICANT CHANGE UP (ref 3.8–10.5)
WBC # FLD AUTO: 7.49 K/UL — SIGNIFICANT CHANGE UP (ref 3.8–10.5)

## 2021-06-27 RX ORDER — POTASSIUM CHLORIDE 20 MEQ
40 PACKET (EA) ORAL EVERY 4 HOURS
Refills: 0 | Status: COMPLETED | OUTPATIENT
Start: 2021-06-27 | End: 2021-06-27

## 2021-06-27 RX ORDER — DILTIAZEM HCL 120 MG
10 CAPSULE, EXT RELEASE 24 HR ORAL
Qty: 125 | Refills: 0 | Status: DISCONTINUED | OUTPATIENT
Start: 2021-06-27 | End: 2021-06-29

## 2021-06-27 RX ORDER — DILTIAZEM HCL 120 MG
360 CAPSULE, EXT RELEASE 24 HR ORAL DAILY
Refills: 0 | Status: DISCONTINUED | OUTPATIENT
Start: 2021-06-28 | End: 2021-07-08

## 2021-06-27 RX ORDER — POTASSIUM CHLORIDE 20 MEQ
10 PACKET (EA) ORAL
Refills: 0 | Status: COMPLETED | OUTPATIENT
Start: 2021-06-27 | End: 2021-06-27

## 2021-06-27 RX ADMIN — RIVAROXABAN 20 MILLIGRAM(S): KIT at 18:19

## 2021-06-27 RX ADMIN — Medication 1 TABLET(S): at 11:38

## 2021-06-27 RX ADMIN — Medication 1 APPLICATION(S): at 05:03

## 2021-06-27 RX ADMIN — Medication 0.5 MILLIGRAM(S): at 10:46

## 2021-06-27 RX ADMIN — OLANZAPINE 2.5 MILLIGRAM(S): 15 TABLET, FILM COATED ORAL at 11:42

## 2021-06-27 RX ADMIN — Medication 100 MILLIEQUIVALENT(S): at 13:20

## 2021-06-27 RX ADMIN — Medication 1 MILLIGRAM(S): at 04:15

## 2021-06-27 RX ADMIN — OLANZAPINE 2.5 MILLIGRAM(S): 15 TABLET, FILM COATED ORAL at 18:17

## 2021-06-27 RX ADMIN — Medication 100 MILLIGRAM(S): at 18:18

## 2021-06-27 RX ADMIN — CEFTRIAXONE 100 MILLIGRAM(S): 500 INJECTION, POWDER, FOR SOLUTION INTRAMUSCULAR; INTRAVENOUS at 06:56

## 2021-06-27 RX ADMIN — Medication 100 MILLIEQUIVALENT(S): at 10:36

## 2021-06-27 RX ADMIN — Medication 100 MILLIGRAM(S): at 05:04

## 2021-06-27 RX ADMIN — Medication 1 MILLIGRAM(S): at 07:43

## 2021-06-27 RX ADMIN — Medication 1 APPLICATION(S): at 18:18

## 2021-06-27 RX ADMIN — Medication 240 MILLIGRAM(S): at 05:04

## 2021-06-27 RX ADMIN — Medication 40 MILLIEQUIVALENT(S): at 14:24

## 2021-06-27 RX ADMIN — Medication 1 MILLIGRAM(S): at 11:38

## 2021-06-27 RX ADMIN — Medication 10 MG/HR: at 09:00

## 2021-06-27 RX ADMIN — Medication 1 MILLIGRAM(S): at 01:56

## 2021-06-27 RX ADMIN — Medication 0.5 MILLIGRAM(S): at 18:00

## 2021-06-27 RX ADMIN — Medication 100 MILLIGRAM(S): at 11:39

## 2021-06-27 RX ADMIN — OLANZAPINE 2.5 MILLIGRAM(S): 15 TABLET, FILM COATED ORAL at 05:04

## 2021-06-27 RX ADMIN — Medication 40 MILLIEQUIVALENT(S): at 18:18

## 2021-06-27 RX ADMIN — Medication 100 MILLIEQUIVALENT(S): at 11:37
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
## 2021-06-27 NOTE — PROGRESS NOTE ADULT - SUBJECTIVE AND OBJECTIVE BOX
Patient is a 71y old  Female who presents with a chief complaint of Dizzy (27 Jun 2021 10:10)      INTERVAL HPI/OVERNIGHT EVENTS: Patient seen and examined. NAD. No complaints.    Vital Signs Last 24 Hrs  T(C): 36.7 (27 Jun 2021 04:54), Max: 36.9 (26 Jun 2021 18:30)  T(F): 98 (27 Jun 2021 04:54), Max: 98.5 (26 Jun 2021 18:30)  HR: 109 (27 Jun 2021 04:54) (85 - 121)  BP: 143/92 (27 Jun 2021 04:54) (124/85 - 143/92)  BP(mean): --  RR: 17 (27 Jun 2021 04:54) (17 - 18)  SpO2: 95% (27 Jun 2021 04:54) (92% - 97%)    06-27    146<H>  |  108  |  11  ----------------------------<  98  3.0<L>   |  29  |  0.58    Ca    8.4<L>      27 Jun 2021 06:57  Phos  3.3     06-27  Mg     1.7     06-27    TPro  6.6  /  Alb  3.2<L>  /  TBili  0.7  /  DBili  .30<H>  /  AST  37  /  ALT  23  /  AlkPhos  97  06-26                          11.5   7.49  )-----------( 214      ( 27 Jun 2021 06:57 )             35.4       CAPILLARY BLOOD GLUCOSE                  ammonium lactate 12% Lotion 1 Application(s) Topical two times a day  cefTRIAXone   IVPB      cefTRIAXone   IVPB 1000 milliGRAM(s) IV Intermittent every 24 hours  diltiazem Infusion 10 mG/Hr IV Continuous <Continuous>  folic acid 1 milliGRAM(s) Oral daily  LORazepam   Injectable 0.5 milliGRAM(s) IV Push every 4 hours  LORazepam   Injectable 1 milliGRAM(s) IV Push every 2 hours PRN  LORazepam   Injectable 1 milliGRAM(s) IV Push every 1 hour PRN  metoprolol tartrate 100 milliGRAM(s) Oral two times a day  multivitamin 1 Tablet(s) Oral daily  OLANZapine Injectable 2.5 milliGRAM(s) IntraMuscular every 6 hours  potassium chloride    Tablet ER 40 milliEquivalent(s) Oral every 4 hours  potassium chloride  10 mEq/100 mL IVPB 10 milliEquivalent(s) IV Intermittent every 1 hour  rivaroxaban 20 milliGRAM(s) Oral with dinner  thiamine 100 milliGRAM(s) Oral daily              REVIEW OF SYSTEMS:  CONSTITUTIONAL: No fever, no weight loss, or no fatigue  NECK: No pain, no stiffness  RESPIRATORY: No cough, no wheezing, no chills, no hemoptysis, No shortness of breath  CARDIOVASCULAR: No chest pain, no palpitations, no dizziness, no leg swelling  GASTROINTESTINAL: No abdominal pain. No nausea, no vomiting, no hematemesis; No diarrhea, no constipation. No melena, no hematochezia.  GENITOURINARY: No dysuria, no frequency, no hematuria, no incontinence  NEUROLOGICAL: No headaches, no loss of strength, no numbness, no tremors  SKIN: No itching, no burning  MUSCULOSKELETAL: No joint pain, no swelling; No muscle, no back, no extremity pain  PSYCHIATRIC: No depression, no mood swings,   HEME/LYMPH: No easy bruising, no bleeding gums  ALLERY AND IMMUNOLOGIC: No hives       Consultant(s) Notes Reviewed:  [X] YES  [ ] NO    PHYSICAL EXAM:  GENERAL: NAD  HEAD:  Atraumatic, Normocephalic  EYES: EOMI, PERRLA, conjunctiva and sclera clear  ENMT: No tonsillar erythema, exudates, or enlargement; Moist mucous membranes  NECK: Supple, No JVD  NERVOUS SYSTEM:  Awake & alert  CHEST/LUNG: Clear to auscultation bilaterally; No rales, rhonchi, wheezing,  HEART: IRRegular rate and rhythm  ABDOMEN: Soft, Nontender, Nondistended; Bowel sounds present  EXTREMITIES:  No clubbing, cyanosis, or edema  LYMPH: No lymphadenopathy noted  SKIN: No rashes      Advanced care planning discussed with patient/family [X] YES   [ ] NO    Advanced care planning discussed with patient/family. Patient's health status was discussed. All appropriate changes have been made regarding patient's end-of-life care. Advanced care planning forms reviewed/discussed/completed.  20 minutes spent.

## 2021-06-27 NOTE — PROGRESS NOTE ADULT - SUBJECTIVE AND OBJECTIVE BOX
Neurology follow up note    JOSAFAT [id number]      Interval History:    Patient feels ok events noted     MEDICATIONS    ammonium lactate 12% Lotion 1 Application(s) Topical two times a day  cefTRIAXone   IVPB      cefTRIAXone   IVPB 1000 milliGRAM(s) IV Intermittent every 24 hours  diltiazem Infusion 10 mG/Hr IV Continuous <Continuous>  folic acid 1 milliGRAM(s) Oral daily  LORazepam   Injectable 0.5 milliGRAM(s) IV Push every 4 hours  LORazepam   Injectable 1 milliGRAM(s) IV Push every 2 hours PRN  LORazepam   Injectable 1 milliGRAM(s) IV Push every 1 hour PRN  metoprolol tartrate 100 milliGRAM(s) Oral two times a day  multivitamin 1 Tablet(s) Oral daily  OLANZapine Injectable 2.5 milliGRAM(s) IntraMuscular every 6 hours  potassium chloride  10 mEq/100 mL IVPB 10 milliEquivalent(s) IV Intermittent every 1 hour  rivaroxaban 20 milliGRAM(s) Oral with dinner  thiamine 100 milliGRAM(s) Oral daily      Allergies    No Known Allergies    Intolerances            Vital Signs Last 24 Hrs  T(C): 36.7 (27 Jun 2021 04:54), Max: 36.9 (26 Jun 2021 18:30)  T(F): 98 (27 Jun 2021 04:54), Max: 98.5 (26 Jun 2021 18:30)  HR: 109 (27 Jun 2021 04:54) (85 - 121)  BP: 143/92 (27 Jun 2021 04:54) (124/85 - 143/92)  BP(mean): --  RR: 17 (27 Jun 2021 04:54) (17 - 18)  SpO2: 95% (27 Jun 2021 04:54) (92% - 97%)      REVIEW OF SYSTEMS:  Constitutional:  The patient denies fever, chills, or night sweats.  Head:  At present, no headaches or lightheadedness.  Eyes:  No double vision, but does have blurry vision, suspect she thinks she has macular degeneration.  Ears:  No ringing in ears.  Neck:  No neck pain.  Respiratory:  No shortness of breath.  Cardiovascular:  No chest pain.  Abdomen:  No nausea, vomiting, or abdominal pain.  Extremities/Neurological:  Has numbness and tingling of her feet, but states she has herniated disc in her back.  This is not new.  Musculoskeletal:  Occasional joint pain, possibly from arthritis.    PHYSICAL EXAMINATION:  HEENT:  Head:  Positive trauma was noted over the left eye.  No scleral icterus was noted.  Ears:  Hearing bilaterally intact.  NECK:  Supple.  RESPIRATORY:  Good air entry bilaterally.  CARDIOVASCULAR:  S1 and S2 heard.  ABDOMEN:  Soft and nontender.  EXTREMITIES:  No clubbing was noted.      NEUROLOGIC:  The patient is awake and alert.  Location was hospital, year was 2021, month would repeat 2021 She was able to follow simple and complex commands, took right hand touch left ear.  Extraocular movements were intact, full visual field.  Speech was fluent.  Smile was symmetric.  Motor:  Bilateral upper were 4/5, bilateral lower were 3-/5.   patient on and off with answering questions             LABS:  CBC Full  -  ( 27 Jun 2021 06:57 )  WBC Count : 7.49 K/uL  RBC Count : 3.44 M/uL  Hemoglobin : 11.5 g/dL  Hematocrit : 35.4 %  Platelet Count - Automated : 214 K/uL  Mean Cell Volume : 102.9 fl  Mean Cell Hemoglobin : 33.4 pg  Mean Cell Hemoglobin Concentration : 32.5 gm/dL  Auto Neutrophil # : x  Auto Lymphocyte # : x  Auto Monocyte # : x  Auto Eosinophil # : x  Auto Basophil # : x  Auto Neutrophil % : x  Auto Lymphocyte % : x  Auto Monocyte % : x  Auto Eosinophil % : x  Auto Basophil % : x      06-27    146<H>  |  108  |  11  ----------------------------<  98  3.0<L>   |  29  |  0.58    Ca    8.4<L>      27 Jun 2021 06:57  Phos  3.3     06-27  Mg     1.7     06-27    TPro  6.6  /  Alb  3.2<L>  /  TBili  0.7  /  DBili  .30<H>  /  AST  37  /  ALT  23  /  AlkPhos  97  06-26    Hemoglobin A1C:     LIVER FUNCTIONS - ( 26 Jun 2021 19:45 )  Alb: 3.2 g/dL / Pro: 6.6 g/dL / ALK PHOS: 97 U/L / ALT: 23 U/L / AST: 37 U/L / GGT: x           Vitamin B12         RADIOLOGY      ANALYSIS AND PLAN:  This is a 71-year-old with an episode of altered mental status.  For episode of altered mental status, as per my conversation with the healthcare proxy, it appears that the patient's house is in disarray.  She does not bathe.  Questionable, the patient has an underlying mild cognitive impairment that is progressively becoming worse with hospital setting leading to the hospital delirium, suspect less likely this is a primary CNS event.    For history of atrial fibrillation, anticoagulation as needed.  For history of hypertension, monitor systolic blood pressure.  For history of hyperlipidemia, condition the patient on statin.  Fall precautions.  I would recommend Psychiatry follow-up in regards to the capacity to make decisions for the patient.    Spoke with the healthcare proxy, Tamiko, at [phone]. past 2 months has refused people to enter her house       Greater than 45 minutes of time was spent with the patient, plan of care, reviewing data, with greater than 50% of the visit was spent counseling and/or coordinating care with multidisciplinary healthcare team   Neurology follow up note    JOSAFAT [id number]      Interval History:    Patient feels ok events noted     MEDICATIONS    ammonium lactate 12% Lotion 1 Application(s) Topical two times a day  cefTRIAXone   IVPB      cefTRIAXone   IVPB 1000 milliGRAM(s) IV Intermittent every 24 hours  diltiazem Infusion 10 mG/Hr IV Continuous <Continuous>  folic acid 1 milliGRAM(s) Oral daily  LORazepam   Injectable 0.5 milliGRAM(s) IV Push every 4 hours  LORazepam   Injectable 1 milliGRAM(s) IV Push every 2 hours PRN  LORazepam   Injectable 1 milliGRAM(s) IV Push every 1 hour PRN  metoprolol tartrate 100 milliGRAM(s) Oral two times a day  multivitamin 1 Tablet(s) Oral daily  OLANZapine Injectable 2.5 milliGRAM(s) IntraMuscular every 6 hours  potassium chloride  10 mEq/100 mL IVPB 10 milliEquivalent(s) IV Intermittent every 1 hour  rivaroxaban 20 milliGRAM(s) Oral with dinner  thiamine 100 milliGRAM(s) Oral daily      Allergies    No Known Allergies    Intolerances            Vital Signs Last 24 Hrs  T(C): 36.7 (27 Jun 2021 04:54), Max: 36.9 (26 Jun 2021 18:30)  T(F): 98 (27 Jun 2021 04:54), Max: 98.5 (26 Jun 2021 18:30)  HR: 109 (27 Jun 2021 04:54) (85 - 121)  BP: 143/92 (27 Jun 2021 04:54) (124/85 - 143/92)  BP(mean): --  RR: 17 (27 Jun 2021 04:54) (17 - 18)  SpO2: 95% (27 Jun 2021 04:54) (92% - 97%)      REVIEW OF SYSTEMS:  Constitutional:  The patient denies fever, chills, or night sweats.  Head:  At present, no headaches or lightheadedness.  Eyes:  No double vision, but does have blurry vision, suspect she thinks she has macular degeneration.  Ears:  No ringing in ears.  Neck:  No neck pain.  Respiratory:  No shortness of breath.  Cardiovascular:  No chest pain.  Abdomen:  No nausea, vomiting, or abdominal pain.  Extremities/Neurological:  Has numbness and tingling of her feet, but states she has herniated disc in her back.  This is not new.  Musculoskeletal:  Occasional joint pain, possibly from arthritis.    PHYSICAL EXAMINATION:  HEENT:  Head:  Positive trauma was noted over the left eye.  No scleral icterus was noted.  Ears:  Hearing bilaterally intact.  NECK:  Supple.  RESPIRATORY:  Good air entry bilaterally.  CARDIOVASCULAR:  S1 and S2 heard.  ABDOMEN:  Soft and nontender.  EXTREMITIES:  No clubbing was noted.      NEUROLOGIC:  The patient is awake and alert.  Location was hospital, year was 2021, month would repeat 2021 She was able to follow simple and complex commands, took right hand touch left ear.  Extraocular movements were intact, full visual field.  Speech was fluent.  Smile was symmetric.  Motor:  Bilateral upper were 4/5, bilateral lower were 3-/5.   patient on and off with answering questions             LABS:  CBC Full  -  ( 27 Jun 2021 06:57 )  WBC Count : 7.49 K/uL  RBC Count : 3.44 M/uL  Hemoglobin : 11.5 g/dL  Hematocrit : 35.4 %  Platelet Count - Automated : 214 K/uL  Mean Cell Volume : 102.9 fl  Mean Cell Hemoglobin : 33.4 pg  Mean Cell Hemoglobin Concentration : 32.5 gm/dL  Auto Neutrophil # : x  Auto Lymphocyte # : x  Auto Monocyte # : x  Auto Eosinophil # : x  Auto Basophil # : x  Auto Neutrophil % : x  Auto Lymphocyte % : x  Auto Monocyte % : x  Auto Eosinophil % : x  Auto Basophil % : x      06-27    146<H>  |  108  |  11  ----------------------------<  98  3.0<L>   |  29  |  0.58    Ca    8.4<L>      27 Jun 2021 06:57  Phos  3.3     06-27  Mg     1.7     06-27    TPro  6.6  /  Alb  3.2<L>  /  TBili  0.7  /  DBili  .30<H>  /  AST  37  /  ALT  23  /  AlkPhos  97  06-26    Hemoglobin A1C:     LIVER FUNCTIONS - ( 26 Jun 2021 19:45 )  Alb: 3.2 g/dL / Pro: 6.6 g/dL / ALK PHOS: 97 U/L / ALT: 23 U/L / AST: 37 U/L / GGT: x           Vitamin B12         RADIOLOGY      ANALYSIS AND PLAN:  This is a 71-year-old with an episode of altered mental status.  For episode of altered mental status, as per my conversation with the healthcare proxy, it appears that the patient's house is in disarray.  She does not bathe.  Questionable, the patient has an underlying mild cognitive impairment that is progressively becoming worse with hospital setting leading to the hospital delirium, suspect less likely this is a primary CNS event.  possible metabolic encephalopathy possible UTI   antibiotics as needed   For history of atrial fibrillation, anticoagulation as needed.  For history of hypertension, monitor systolic blood pressure.  For history of hyperlipidemia, condition the patient on statin.  Fall precautions.  I would recommend Psychiatry follow-up in regards to the capacity to make decisions for the patient.    Spoke with the healthcare proxy, Tamiko, at [phone]. past 2 months has refused people to enter her house       Greater than 45 minutes of time was spent with the patient, plan of care, reviewing data, with greater than 50% of the visit was spent counseling and/or coordinating care with multidisciplinary healthcare team

## 2021-06-27 NOTE — PROGRESS NOTE ADULT - PROBLEM SELECTOR PLAN 2
Unclear etiology  Possibly secondary to Etoh withdrawal, UTI, hemochromatosis  Neuro/Psych f/u  Detox consult noted  Continue CIWA protocol

## 2021-06-27 NOTE — CONSULT NOTE ADULT - ASSESSMENT
Hereditary Hemochromatosis previously on phlebotomy program. Has not been seen in 3 years with previous control of ferritin with last ferritin 11/14/2018 30  Now with episode of altered mental status of uncertain etiology ?related to alcohol withdrawal or recurrence of encephalitis  this is not related to hemochromatosis    Recommendations:  1.  continue medical and neuro evaluation  2.  with current ferritin 168 can be observed and will evaluate for phlebotomy when stable and as outpatient  3.  further heme recommendations pending above  discussed with Dr. Adan

## 2021-06-27 NOTE — CONSULT NOTE ADULT - ASSESSMENT
68 year old female with a history of HTN, HL, DVT, lymphedema, hemochromatosis who was sent in from PMD with rapid atrial fibrillation.  AMS - confusion - ? etiol  ? LOUANN - etoh use disorder - prn ativan and ciwa - and Ativan Low dose ATC -   check Ammonia and VBG -   pt may have BINH - untreated  pt has Hx of Hemochromatosis - untreated and does not follow up regularly - known to Dr Kitchen - will check Iron Studies - may be a cause of AMS - confusion  spoke with HCP - emergency contact - best friend - listed under contacts  cont 1 to 1   supportive measures  pt is on full dose AC - risk for fall    on [facility] rx regimen  work up in progress

## 2021-06-28 LAB
AMMONIA BLD-MCNC: 17 UMOL/L — SIGNIFICANT CHANGE UP (ref 11–32)
ANION GAP SERPL CALC-SCNC: 10 MMOL/L — SIGNIFICANT CHANGE UP (ref 5–17)
BUN SERPL-MCNC: 7 MG/DL — SIGNIFICANT CHANGE UP (ref 7–23)
CALCIUM SERPL-MCNC: 8.6 MG/DL — SIGNIFICANT CHANGE UP (ref 8.5–10.1)
CHLORIDE SERPL-SCNC: 105 MMOL/L — SIGNIFICANT CHANGE UP (ref 96–108)
CO2 SERPL-SCNC: 28 MMOL/L — SIGNIFICANT CHANGE UP (ref 22–31)
CREAT SERPL-MCNC: 0.58 MG/DL — SIGNIFICANT CHANGE UP (ref 0.5–1.3)
GLUCOSE SERPL-MCNC: 123 MG/DL — HIGH (ref 70–99)
HCT VFR BLD CALC: 38.8 % — SIGNIFICANT CHANGE UP (ref 34.5–45)
HGB BLD-MCNC: 12.8 G/DL — SIGNIFICANT CHANGE UP (ref 11.5–15.5)
IRON SATN MFR SERPL: 30 % — SIGNIFICANT CHANGE UP (ref 14–50)
IRON SATN MFR SERPL: 66 UG/DL — SIGNIFICANT CHANGE UP (ref 30–160)
MAGNESIUM SERPL-MCNC: 1.5 MG/DL — LOW (ref 1.6–2.6)
MCHC RBC-ENTMCNC: 33 GM/DL — SIGNIFICANT CHANGE UP (ref 32–36)
MCHC RBC-ENTMCNC: 33.9 PG — SIGNIFICANT CHANGE UP (ref 27–34)
MCV RBC AUTO: 102.6 FL — HIGH (ref 80–100)
NRBC # BLD: 0 /100 WBCS — SIGNIFICANT CHANGE UP (ref 0–0)
PHOSPHATE SERPL-MCNC: 3.2 MG/DL — SIGNIFICANT CHANGE UP (ref 2.5–4.5)
PLATELET # BLD AUTO: 221 K/UL — SIGNIFICANT CHANGE UP (ref 150–400)
POTASSIUM SERPL-MCNC: 3.4 MMOL/L — LOW (ref 3.5–5.3)
POTASSIUM SERPL-SCNC: 3.4 MMOL/L — LOW (ref 3.5–5.3)
RBC # BLD: 3.78 M/UL — LOW (ref 3.8–5.2)
RBC # FLD: 16 % — HIGH (ref 10.3–14.5)
SODIUM SERPL-SCNC: 143 MMOL/L — SIGNIFICANT CHANGE UP (ref 135–145)
T PALLIDUM AB TITR SER: NEGATIVE — SIGNIFICANT CHANGE UP
TIBC SERPL-MCNC: 220 UG/DL — SIGNIFICANT CHANGE UP (ref 220–430)
UIBC SERPL-MCNC: 154 UG/DL — SIGNIFICANT CHANGE UP (ref 110–370)
WBC # BLD: 10.63 K/UL — HIGH (ref 3.8–10.5)
WBC # FLD AUTO: 10.63 K/UL — HIGH (ref 3.8–10.5)

## 2021-06-28 PROCEDURE — 70551 MRI BRAIN STEM W/O DYE: CPT | Mod: 26

## 2021-06-28 PROCEDURE — 99221 1ST HOSP IP/OBS SF/LOW 40: CPT

## 2021-06-28 RX ORDER — POTASSIUM CHLORIDE 20 MEQ
40 PACKET (EA) ORAL EVERY 4 HOURS
Refills: 0 | Status: COMPLETED | OUTPATIENT
Start: 2021-06-28 | End: 2021-06-28

## 2021-06-28 RX ORDER — DIGOXIN 250 MCG
500 TABLET ORAL ONCE
Refills: 0 | Status: DISCONTINUED | OUTPATIENT
Start: 2021-06-28 | End: 2021-06-28

## 2021-06-28 RX ORDER — MAGNESIUM SULFATE 500 MG/ML
2 VIAL (ML) INJECTION ONCE
Refills: 0 | Status: COMPLETED | OUTPATIENT
Start: 2021-06-28 | End: 2021-06-28

## 2021-06-28 RX ORDER — DIGOXIN 250 MCG
500 TABLET ORAL ONCE
Refills: 0 | Status: COMPLETED | OUTPATIENT
Start: 2021-06-28 | End: 2021-06-28

## 2021-06-28 RX ADMIN — Medication 0.5 MILLIGRAM(S): at 22:45

## 2021-06-28 RX ADMIN — Medication 0.5 MILLIGRAM(S): at 14:00

## 2021-06-28 RX ADMIN — Medication 1 MILLIGRAM(S): at 13:00

## 2021-06-28 RX ADMIN — Medication 40 MILLIEQUIVALENT(S): at 16:30

## 2021-06-28 RX ADMIN — OLANZAPINE 2.5 MILLIGRAM(S): 15 TABLET, FILM COATED ORAL at 17:46

## 2021-06-28 RX ADMIN — OLANZAPINE 2.5 MILLIGRAM(S): 15 TABLET, FILM COATED ORAL at 12:21

## 2021-06-28 RX ADMIN — Medication 1 APPLICATION(S): at 05:01

## 2021-06-28 RX ADMIN — OLANZAPINE 2.5 MILLIGRAM(S): 15 TABLET, FILM COATED ORAL at 00:06

## 2021-06-28 RX ADMIN — Medication 0.5 MILLIGRAM(S): at 05:24

## 2021-06-28 RX ADMIN — OLANZAPINE 2.5 MILLIGRAM(S): 15 TABLET, FILM COATED ORAL at 05:24

## 2021-06-28 RX ADMIN — CEFTRIAXONE 100 MILLIGRAM(S): 500 INJECTION, POWDER, FOR SOLUTION INTRAMUSCULAR; INTRAVENOUS at 07:44

## 2021-06-28 RX ADMIN — Medication 40 MILLIEQUIVALENT(S): at 12:01

## 2021-06-28 RX ADMIN — Medication 100 MILLIGRAM(S): at 17:46

## 2021-06-28 RX ADMIN — Medication 100 MILLIGRAM(S): at 05:01

## 2021-06-28 RX ADMIN — Medication 0.5 MILLIGRAM(S): at 12:01

## 2021-06-28 RX ADMIN — Medication 10 MG/HR: at 03:08

## 2021-06-28 RX ADMIN — Medication 0.5 MILLIGRAM(S): at 01:43

## 2021-06-28 RX ADMIN — Medication 1 TABLET(S): at 12:05

## 2021-06-28 RX ADMIN — Medication 100 MILLIGRAM(S): at 12:04

## 2021-06-28 RX ADMIN — Medication 1 APPLICATION(S): at 17:47

## 2021-06-28 RX ADMIN — Medication 1 MILLIGRAM(S): at 12:04

## 2021-06-28 RX ADMIN — Medication 50 GRAM(S): at 12:00

## 2021-06-28 RX ADMIN — RIVAROXABAN 20 MILLIGRAM(S): KIT at 17:46

## 2021-06-28 RX ADMIN — Medication 0.5 MILLIGRAM(S): at 17:46

## 2021-06-28 RX ADMIN — Medication 500 MICROGRAM(S): at 08:20

## 2021-06-28 RX ADMIN — Medication 360 MILLIGRAM(S): at 05:01

## 2021-06-28 NOTE — PROGRESS NOTE ADULT - SUBJECTIVE AND OBJECTIVE BOX
Patient is a 71y old  Female who presents with a chief complaint of Dizzy (28 Jun 2021 09:29)      INTERVAL HPI/OVERNIGHT EVENTS: Patient seen and examined. NAD. No complaints. Still lethargic.    Vital Signs Last 24 Hrs  T(C): 36.7 (28 Jun 2021 05:00), Max: 36.7 (28 Jun 2021 05:00)  T(F): 98 (28 Jun 2021 05:00), Max: 98 (28 Jun 2021 05:00)  HR: 125 (28 Jun 2021 06:57) (91 - 136)  BP: 132/92 (28 Jun 2021 06:57) (126/85 - 135/93)  BP(mean): --  RR: 18 (28 Jun 2021 05:00) (17 - 19)  SpO2: 96% (28 Jun 2021 05:00) (94% - 96%)    06-28    143  |  105  |  7   ----------------------------<  123<H>  3.4<L>   |  28  |  0.58    Ca    8.6      28 Jun 2021 07:25  Phos  3.2     06-28  Mg     1.5     06-28    TPro  6.6  /  Alb  3.2<L>  /  TBili  0.7  /  DBili  .30<H>  /  AST  37  /  ALT  23  /  AlkPhos  97  06-26                          12.8   10.63 )-----------( 221      ( 28 Jun 2021 07:25 )             38.8       CAPILLARY BLOOD GLUCOSE                  ammonium lactate 12% Lotion 1 Application(s) Topical two times a day  cefTRIAXone   IVPB      cefTRIAXone   IVPB 1000 milliGRAM(s) IV Intermittent every 24 hours  diltiazem    milliGRAM(s) Oral daily  diltiazem Infusion 10 mG/Hr IV Continuous <Continuous>  folic acid 1 milliGRAM(s) Oral daily  LORazepam   Injectable 1 milliGRAM(s) IV Push every 2 hours PRN  LORazepam   Injectable 0.5 milliGRAM(s) IV Push every 4 hours  LORazepam   Injectable 1 milliGRAM(s) IV Push every 1 hour PRN  LORazepam   Injectable 1 milliGRAM(s) IV Push once  magnesium sulfate  IVPB 2 Gram(s) IV Intermittent once  metoprolol tartrate 100 milliGRAM(s) Oral two times a day  multivitamin 1 Tablet(s) Oral daily  OLANZapine Injectable 2.5 milliGRAM(s) IntraMuscular every 6 hours  potassium chloride    Tablet ER 40 milliEquivalent(s) Oral every 4 hours  rivaroxaban 20 milliGRAM(s) Oral with dinner  thiamine 100 milliGRAM(s) Oral daily          REVIEW OF SYSTEMS:  CONSTITUTIONAL: No fever, no weight loss, or no fatigue  NECK: No pain, no stiffness  RESPIRATORY: No cough, no wheezing, no chills, no hemoptysis, No shortness of breath  CARDIOVASCULAR: No chest pain, no palpitations, no dizziness, no leg swelling  GASTROINTESTINAL: No abdominal pain. No nausea, no vomiting, no hematemesis; No diarrhea, no constipation. No melena, no hematochezia.  GENITOURINARY: No dysuria, no frequency, no hematuria, no incontinence  NEUROLOGICAL: No headaches, no loss of strength, no numbness, no tremors  SKIN: No itching, no burning  MUSCULOSKELETAL: No joint pain, no swelling; No muscle, no back, no extremity pain  PSYCHIATRIC: No depression, no mood swings,   HEME/LYMPH: No easy bruising, no bleeding gums  ALLERY AND IMMUNOLOGIC: No hives       Consultant(s) Notes Reviewed:  [X] YES  [ ] NO    PHYSICAL EXAM:  GENERAL: NAD  HEAD:  Atraumatic, Normocephalic  EYES: EOMI, PERRLA, conjunctiva and sclera clear  ENMT: No tonsillar erythema, exudates, or enlargement; Moist mucous membranes  NECK: Supple, No JVD  NERVOUS SYSTEM:  lethargic  CHEST/LUNG: Clear to auscultation bilaterally; No rales, rhonchi, wheezing,  HEART: Regular rate and rhythm  ABDOMEN: Soft, Nontender, Nondistended; Bowel sounds present  EXTREMITIES:  No clubbing, cyanosis, or edema  LYMPH: No lymphadenopathy noted  SKIN: No rashes      Advanced care planning discussed with patient/family [X] YES   [ ] NO    Advanced care planning discussed with patient/family. Patient's health status was discussed. All appropriate changes have been made regarding patient's end-of-life care. Advanced care planning forms reviewed/discussed/completed.  20 minutes spent.

## 2021-06-28 NOTE — BH CONSULTATION LIAISON ASSESSMENT NOTE - CURRENT MEDICATION
MEDICATIONS  (STANDING):  ammonium lactate 12% Lotion 1 Application(s) Topical two times a day  cefTRIAXone   IVPB      cefTRIAXone   IVPB 1000 milliGRAM(s) IV Intermittent every 24 hours  diltiazem    milliGRAM(s) Oral daily  diltiazem Infusion 10 mG/Hr (10 mL/Hr) IV Continuous <Continuous>  folic acid 1 milliGRAM(s) Oral daily  LORazepam   Injectable 0.5 milliGRAM(s) IV Push every 4 hours  metoprolol tartrate 100 milliGRAM(s) Oral two times a day  multivitamin 1 Tablet(s) Oral daily  OLANZapine Injectable 2.5 milliGRAM(s) IntraMuscular every 6 hours  potassium chloride    Tablet ER 40 milliEquivalent(s) Oral every 4 hours  rivaroxaban 20 milliGRAM(s) Oral with dinner  thiamine 100 milliGRAM(s) Oral daily    MEDICATIONS  (PRN):  LORazepam   Injectable 1 milliGRAM(s) IV Push every 2 hours PRN CIWA-Ar score increase by 2 points and a total score of 7 or less  LORazepam   Injectable 1 milliGRAM(s) IV Push every 1 hour PRN CIWA-Ar score 8 or greater

## 2021-06-28 NOTE — PROGRESS NOTE ADULT - PROBLEM SELECTOR PLAN 1
Back on iv cardizem  Continue Metoprolol and po cardizem  Received digoxin  ECHO noted  Continue Xarelto  TSH normal on admission -- now slightly up secondary to acute illness  Cardio consult noted  Further work-up/management pending clinical course.

## 2021-06-28 NOTE — PROGRESS NOTE ADULT - PROBLEM SELECTOR PLAN 2
Unclear etiology  Possibly secondary to Etoh withdrawal, UTI, hemochromatosis  Neuro/Psych f/u  Detox consult noted  Continue CIWA protocol Unclear etiology  Possibly secondary to Etoh withdrawal, UTI, hemochromatosis  HCP states that she went to patient's house -- saw many empty bottles of hard liquor  Neuro/Psych f/u  Detox consult noted  Continue CIWA protocol

## 2021-06-28 NOTE — BH CONSULTATION LIAISON ASSESSMENT NOTE - NSBHCHARTREVIEWVS_PSY_A_CORE FT
Vital Signs Last 24 Hrs  T(C): 36.7 (28 Jun 2021 05:00), Max: 36.7 (28 Jun 2021 05:00)  T(F): 98 (28 Jun 2021 05:00), Max: 98 (28 Jun 2021 05:00)  HR: 125 (28 Jun 2021 06:57) (108 - 136)  BP: 132/92 (28 Jun 2021 06:57) (132/89 - 135/93)  BP(mean): --  RR: 18 (28 Jun 2021 05:00) (17 - 18)  SpO2: 96% (28 Jun 2021 05:00) (95% - 96%)

## 2021-06-28 NOTE — BH CONSULTATION LIAISON ASSESSMENT NOTE - HPI (INCLUDE ILLNESS QUALITY, SEVERITY, DURATION, TIMING, CONTEXT, MODIFYING FACTORS, ASSOCIATED SIGNS AND SYMPTOMS)
Patient seen, evaluated and chart reviewed. Patient is a 68 year old female with a history of HTN, HL, DVT, lymphedema, hemochromatosis who was sent in from [facility] with rapid atrial fibrillation. She states over the last couple of months she has felt lightheaded and nauseous at times. No palpitations, chest pain, shortness of breath. She is able to complete exercises at home. She has not seen her cardiologist Dr. Gonzalez in over a year. No recent cardiac testing. Patient presents with confusion, likely consistent with delirium, possibly from alcohol withdrawal.

## 2021-06-28 NOTE — PROGRESS NOTE ADULT - SUBJECTIVE AND OBJECTIVE BOX
Date/Time Patient Seen:  		  Referring MD:   Data Reviewed	       Patient is a 71y old  Female who presents with a chief complaint of Dizzy (27 Jun 2021 21:54)      Subjective/HPI     PAST MEDICAL & SURGICAL HISTORY:  HTN (hypertension), benign    CHF (congestive heart failure)    Pulmonary embolism    HLD (hyperlipidemia)    HTN (hypertension)    Hemochromatosis    S/P D&amp;C (status post dilation and curettage)  with IUD insertion in 2013    No significant past surgical history          Medication list         MEDICATIONS  (STANDING):  ammonium lactate 12% Lotion 1 Application(s) Topical two times a day  cefTRIAXone   IVPB      cefTRIAXone   IVPB 1000 milliGRAM(s) IV Intermittent every 24 hours  diltiazem    milliGRAM(s) Oral daily  diltiazem Infusion 10 mG/Hr (10 mL/Hr) IV Continuous <Continuous>  folic acid 1 milliGRAM(s) Oral daily  LORazepam   Injectable 0.5 milliGRAM(s) IV Push every 4 hours  metoprolol tartrate 100 milliGRAM(s) Oral two times a day  multivitamin 1 Tablet(s) Oral daily  OLANZapine Injectable 2.5 milliGRAM(s) IntraMuscular every 6 hours  rivaroxaban 20 milliGRAM(s) Oral with dinner  thiamine 100 milliGRAM(s) Oral daily    MEDICATIONS  (PRN):  LORazepam   Injectable 1 milliGRAM(s) IV Push every 2 hours PRN CIWA-Ar score increase by 2 points and a total score of 7 or less  LORazepam   Injectable 1 milliGRAM(s) IV Push every 1 hour PRN CIWA-Ar score 8 or greater         Vitals log        ICU Vital Signs Last 24 Hrs  T(C): 36.7 (28 Jun 2021 05:00), Max: 36.7 (28 Jun 2021 05:00)  T(F): 98 (28 Jun 2021 05:00), Max: 98 (28 Jun 2021 05:00)  HR: 136 (28 Jun 2021 05:00) (91 - 136)  BP: 132/89 (28 Jun 2021 05:00) (126/85 - 135/93)  BP(mean): --  ABP: --  ABP(mean): --  RR: 18 (28 Jun 2021 05:00) (17 - 19)  SpO2: 96% (28 Jun 2021 05:00) (94% - 96%)           Input and Output:  I&O's Detail    27 Jun 2021 07:01  -  28 Jun 2021 06:03  --------------------------------------------------------  IN:    Diltiazem: 190 mL    IV PiggyBack: 300 mL  Total IN: 490 mL    OUT:    Voided (mL): 750 mL  Total OUT: 750 mL    Total NET: -260 mL          Lab Data                        11.5   7.49  )-----------( 214      ( 27 Jun 2021 06:57 )             35.4     06-27    146<H>  |  108  |  11  ----------------------------<  98  3.0<L>   |  29  |  0.58    Ca    8.4<L>      27 Jun 2021 06:57  Phos  3.3     06-27  Mg     1.7     06-27    TPro  6.6  /  Alb  3.2<L>  /  TBili  0.7  /  DBili  .30<H>  /  AST  37  /  ALT  23  /  AlkPhos  97  06-26            Review of Systems	      Objective     Physical Examination    heart s1s2  lung dec BS  abd soft  head nc  obese        Pertinent Lab findings & Imaging      Ino:  NO   Adequate UO     I&O's Detail    27 Jun 2021 07:01  -  28 Jun 2021 06:03  --------------------------------------------------------  IN:    Diltiazem: 190 mL    IV PiggyBack: 300 mL  Total IN: 490 mL    OUT:    Voided (mL): 750 mL  Total OUT: 750 mL    Total NET: -260 mL               Discussed with:     Cultures:	        Radiology

## 2021-06-28 NOTE — BH CONSULTATION LIAISON ASSESSMENT NOTE - RISK ASSESSMENT
Patient denies suicidal ideation, no reported prior history of suicidal ideation, good social support

## 2021-06-28 NOTE — PROGRESS NOTE ADULT - SUBJECTIVE AND OBJECTIVE BOX
Chief Complaint: Tachycardia    Interval Events: Remains confused/agitated.    Review of Systems:  General: No fevers, chills, weight loss or gain  Skin: No rashes, color changes  Cardiovascular: No chest pain, orthopnea  Respiratory: No shortness of breath, cough  Gastrointestinal: No nausea, abdominal pain  Genitourinary: No incontinence, pain with urination  Musculoskeletal: No pain, swelling, decreased range of motion  Neurological: No headache, weakness  Psychiatric: No depression, anxiety  Endocrine: No weight loss or gain, increased thirst  All other systems are comprehensively negative.    Physical Exam:  Vital Signs Last 24 Hrs  T(C): 36.7 (28 Jun 2021 05:00), Max: 36.7 (28 Jun 2021 05:00)  T(F): 98 (28 Jun 2021 05:00), Max: 98 (28 Jun 2021 05:00)  HR: 125 (28 Jun 2021 06:57) (91 - 136)  BP: 132/92 (28 Jun 2021 06:57) (126/85 - 135/93)  BP(mean): --  RR: 18 (28 Jun 2021 05:00) (17 - 19)  SpO2: 96% (28 Jun 2021 05:00) (94% - 96%)  General: Confused  HEENT: MMM  Neck: No JVD, no carotid bruit  Lungs: CTAB  CV: Irregular, nl S1/S2, no M/R/G  Abdomen: S/NT/ND, +BS  Extremities: No LE edema, no cyanosis  Neuro: AAOx0, non-focal  Skin: No rash    Labs:             06-28    143  |  105  |  7   ----------------------------<  123<H>  3.4<L>   |  28  |  0.58    Ca    8.6      28 Jun 2021 07:25  Phos  3.2     06-28  Mg     1.5     06-28    TPro  6.6  /  Alb  3.2<L>  /  TBili  0.7  /  DBili  .30<H>  /  AST  37  /  ALT  23  /  AlkPhos  97  06-26                        12.8   10.63 )-----------( 221      ( 28 Jun 2021 07:25 )             38.8         Telemetry: AF 140s

## 2021-06-28 NOTE — PROGRESS NOTE ADULT - SUBJECTIVE AND OBJECTIVE BOX
Neurology follow up note    JOSAFAT [id number]      Interval History:    Patient seen with AID     MEDICATIONS    ammonium lactate 12% Lotion 1 Application(s) Topical two times a day  cefTRIAXone   IVPB      cefTRIAXone   IVPB 1000 milliGRAM(s) IV Intermittent every 24 hours  diltiazem    milliGRAM(s) Oral daily  diltiazem Infusion 10 mG/Hr IV Continuous <Continuous>  folic acid 1 milliGRAM(s) Oral daily  LORazepam   Injectable 1 milliGRAM(s) IV Push every 2 hours PRN  LORazepam   Injectable 0.5 milliGRAM(s) IV Push every 4 hours  LORazepam   Injectable 1 milliGRAM(s) IV Push every 1 hour PRN  magnesium sulfate  IVPB 2 Gram(s) IV Intermittent once  metoprolol tartrate 100 milliGRAM(s) Oral two times a day  multivitamin 1 Tablet(s) Oral daily  OLANZapine Injectable 2.5 milliGRAM(s) IntraMuscular every 6 hours  potassium chloride    Tablet ER 40 milliEquivalent(s) Oral every 4 hours  rivaroxaban 20 milliGRAM(s) Oral with dinner  thiamine 100 milliGRAM(s) Oral daily      Allergies    No Known Allergies    Intolerances            Vital Signs Last 24 Hrs  T(C): 36.7 (28 Jun 2021 05:00), Max: 36.7 (28 Jun 2021 05:00)  T(F): 98 (28 Jun 2021 05:00), Max: 98 (28 Jun 2021 05:00)  HR: 125 (28 Jun 2021 06:57) (91 - 136)  BP: 132/92 (28 Jun 2021 06:57) (126/85 - 135/93)  BP(mean): --  RR: 18 (28 Jun 2021 05:00) (17 - 19)  SpO2: 96% (28 Jun 2021 05:00) (94% - 96%)          REVIEW OF SYSTEMS:  Constitutional:  The patient denies fever, chills, or night sweats.  Head:  At present, no headaches or lightheadedness.  Eyes:  No double vision, but does have blurry vision, suspect she thinks she has macular degeneration.  Ears:  No ringing in ears.  Neck:  No neck pain.  Respiratory:  No shortness of breath.  Cardiovascular:  No chest pain.  Abdomen:  No nausea, vomiting, or abdominal pain.  Extremities/Neurological:  Has numbness and tingling of her feet, but states she has herniated disc in her back.  This is not new.  Musculoskeletal:  Occasional joint pain, possibly from arthritis.    PHYSICAL EXAMINATION:  HEENT:  Head:  Positive trauma was noted over the left eye.  No scleral icterus was noted.  Ears:  Hearing bilaterally intact.  NECK:  Supple.  RESPIRATORY:  Good air entry bilaterally.  CARDIOVASCULAR:  S1 and S2 heard.  ABDOMEN:  Soft and nontender.  EXTREMITIES:  No clubbing was noted.      NEUROLOGIC:  The patient is awake   Location was hospital, year was 2021, month june 5 nickels in a quater 2+2 4 could not say what 4 +4 was  She was able to follow simple  but not complex commands  Extraocular movements were intact, full visual field.    Speech was fluent at times mumbles .  Smile was symmetric.  Motor:  Bilateral upper were 4/5, bilateral lower were 3-/5.   patient on and off with answering questions              LABS:  CBC Full  -  ( 28 Jun 2021 07:25 )  WBC Count : 10.63 K/uL  RBC Count : 3.78 M/uL  Hemoglobin : 12.8 g/dL  Hematocrit : 38.8 %  Platelet Count - Automated : 221 K/uL  Mean Cell Volume : 102.6 fl  Mean Cell Hemoglobin : 33.9 pg  Mean Cell Hemoglobin Concentration : 33.0 gm/dL  Auto Neutrophil # : x  Auto Lymphocyte # : x  Auto Monocyte # : x  Auto Eosinophil # : x  Auto Basophil # : x  Auto Neutrophil % : x  Auto Lymphocyte % : x  Auto Monocyte % : x  Auto Eosinophil % : x  Auto Basophil % : x      06-28    143  |  105  |  7   ----------------------------<  123<H>  3.4<L>   |  28  |  0.58    Ca    8.6      28 Jun 2021 07:25  Phos  3.2     06-28  Mg     1.5     06-28    TPro  6.6  /  Alb  3.2<L>  /  TBili  0.7  /  DBili  .30<H>  /  AST  37  /  ALT  23  /  AlkPhos  97  06-26    Hemoglobin A1C:     LIVER FUNCTIONS - ( 26 Jun 2021 19:45 )  Alb: 3.2 g/dL / Pro: 6.6 g/dL / ALK PHOS: 97 U/L / ALT: 23 U/L / AST: 37 U/L / GGT: x           Vitamin B12 Vitamin B12, Serum: 639 pg/mL (06-27 @ 11:34)          RADIOLOGY  ANALYSIS AND PLAN:  This is a 71-year-old with an episode of altered mental status.  For episode of altered mental status, as per my conversation with the healthcare proxy, it appears that the patient's house is in disarray.  She does not bathe.  Questionable, the patient has an underlying mild cognitive impairment that is progressively becoming worse with hospital setting leading to the hospital delirium, suspect less likely this is a primary CNS event.  possible metabolic encephalopathy possible UTI   antibiotics as needed   For history of atrial fibrillation, anticoagulation as needed.  For history of hypertension, monitor systolic blood pressure.  For history of hyperlipidemia, condition the patient on statin.  Fall precautions.  monitor oxygen and CO2 levels as needed   I would recommend Psychiatry follow-up in regards to the capacity to make decisions for the patient.    Spoke with the healthcare proxy, Tamiko, at [phone]. past 2 months has refused people to enter her house       Greater than 45 minutes of time was spent with the patient, plan of care, reviewing data, with greater than 50% of the visit was spent counseling and/or coordinating care with multidisciplinary healthcare team       Neurology follow up note    JOSAFAT [id number]      Interval History:    Patient seen with AID     MEDICATIONS    ammonium lactate 12% Lotion 1 Application(s) Topical two times a day  cefTRIAXone   IVPB      cefTRIAXone   IVPB 1000 milliGRAM(s) IV Intermittent every 24 hours  diltiazem    milliGRAM(s) Oral daily  diltiazem Infusion 10 mG/Hr IV Continuous <Continuous>  folic acid 1 milliGRAM(s) Oral daily  LORazepam   Injectable 1 milliGRAM(s) IV Push every 2 hours PRN  LORazepam   Injectable 0.5 milliGRAM(s) IV Push every 4 hours  LORazepam   Injectable 1 milliGRAM(s) IV Push every 1 hour PRN  magnesium sulfate  IVPB 2 Gram(s) IV Intermittent once  metoprolol tartrate 100 milliGRAM(s) Oral two times a day  multivitamin 1 Tablet(s) Oral daily  OLANZapine Injectable 2.5 milliGRAM(s) IntraMuscular every 6 hours  potassium chloride    Tablet ER 40 milliEquivalent(s) Oral every 4 hours  rivaroxaban 20 milliGRAM(s) Oral with dinner  thiamine 100 milliGRAM(s) Oral daily      Allergies    No Known Allergies    Intolerances            Vital Signs Last 24 Hrs  T(C): 36.7 (28 Jun 2021 05:00), Max: 36.7 (28 Jun 2021 05:00)  T(F): 98 (28 Jun 2021 05:00), Max: 98 (28 Jun 2021 05:00)  HR: 125 (28 Jun 2021 06:57) (91 - 136)  BP: 132/92 (28 Jun 2021 06:57) (126/85 - 135/93)  BP(mean): --  RR: 18 (28 Jun 2021 05:00) (17 - 19)  SpO2: 96% (28 Jun 2021 05:00) (94% - 96%)          REVIEW OF SYSTEMS:  Constitutional:  The patient denies fever, chills, or night sweats.  Head:  At present, no headaches or lightheadedness.  Eyes:  No double vision, but does have blurry vision, suspect she thinks she has macular degeneration.  Ears:  No ringing in ears.  Neck:  No neck pain.  Respiratory:  No shortness of breath.  Cardiovascular:  No chest pain.  Abdomen:  No nausea, vomiting, or abdominal pain.  Extremities/Neurological:  Has numbness and tingling of her feet, but states she has herniated disc in her back.  This is not new.  Musculoskeletal:  Occasional joint pain, possibly from arthritis.    PHYSICAL EXAMINATION:  HEENT:  Head:  Positive trauma was noted over the left eye.  No scleral icterus was noted.  Ears:  Hearing bilaterally intact.  NECK:  Supple.  RESPIRATORY:  Good air entry bilaterally.  CARDIOVASCULAR:  S1 and S2 heard.  ABDOMEN:  Soft and nontender.  EXTREMITIES:  No clubbing was noted.      NEUROLOGIC:  The patient is awake   Location was hospital, year was 2021, month june 5 nickels in a quater 2+2 4 could not say what 4 +4 was  She was able to follow simple  but not complex commands  Extraocular movements were intact, full visual field.    Speech was fluent at times mumbles .  Smile was symmetric.  Motor:  Bilateral upper were 4/5, bilateral lower were 3-/5.   patient on and off with answering questions              LABS:  CBC Full  -  ( 28 Jun 2021 07:25 )  WBC Count : 10.63 K/uL  RBC Count : 3.78 M/uL  Hemoglobin : 12.8 g/dL  Hematocrit : 38.8 %  Platelet Count - Automated : 221 K/uL  Mean Cell Volume : 102.6 fl  Mean Cell Hemoglobin : 33.9 pg  Mean Cell Hemoglobin Concentration : 33.0 gm/dL  Auto Neutrophil # : x  Auto Lymphocyte # : x  Auto Monocyte # : x  Auto Eosinophil # : x  Auto Basophil # : x  Auto Neutrophil % : x  Auto Lymphocyte % : x  Auto Monocyte % : x  Auto Eosinophil % : x  Auto Basophil % : x      06-28    143  |  105  |  7   ----------------------------<  123<H>  3.4<L>   |  28  |  0.58    Ca    8.6      28 Jun 2021 07:25  Phos  3.2     06-28  Mg     1.5     06-28    TPro  6.6  /  Alb  3.2<L>  /  TBili  0.7  /  DBili  .30<H>  /  AST  37  /  ALT  23  /  AlkPhos  97  06-26    Hemoglobin A1C:     LIVER FUNCTIONS - ( 26 Jun 2021 19:45 )  Alb: 3.2 g/dL / Pro: 6.6 g/dL / ALK PHOS: 97 U/L / ALT: 23 U/L / AST: 37 U/L / GGT: x           Vitamin B12 Vitamin B12, Serum: 639 pg/mL (06-27 @ 11:34)          RADIOLOGY  ANALYSIS AND PLAN:  This is a 71-year-old with an episode of altered mental status.  Suspect patients AMS is possible from the history of hemochromatosis with oxidative stress and iron leading to a chronic neurodegenerative deterioration in overall cognitive status which unfortunately will be progressive with iron deposition in areas including the thalamus, putamen and hippocampus. Questionable, the patient has an worsening mild cognitive impairment that is progressively becoming worse with hospital setting leading to the hospital delirium, possible metabolic encephalopathy from possible UTI   antibiotics as needed   For history of atrial fibrillation, anticoagulation as needed.  For history of hypertension, monitor systolic blood pressure.  For history of hyperlipidemia, condition the patient on statin.  Fall precautions.  monitor oxygen and CO2 levels as needed  recommend Psychiatry follow-up in regards to control of symptoms.  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 she understands that she will deteriorate cognitive wise as time goes on will most likely need placement       Greater than 45 minutes of time was spent with the patient, plan of care, reviewing data, with greater than 50% of the visit was spent counseling and/or coordinating care with multidisciplinary healthcare team

## 2021-06-28 NOTE — PROGRESS NOTE ADULT - SUBJECTIVE AND OBJECTIVE BOX
Patient seen and examined;  Chart reviewed and events noted;   remains confused; speech is muffled      MEDICATIONS  (STANDING):  ammonium lactate 12% Lotion 1 Application(s) Topical two times a day  cefTRIAXone   IVPB      cefTRIAXone   IVPB 1000 milliGRAM(s) IV Intermittent every 24 hours  diltiazem    milliGRAM(s) Oral daily  diltiazem Infusion 10 mG/Hr (10 mL/Hr) IV Continuous <Continuous>  folic acid 1 milliGRAM(s) Oral daily  LORazepam   Injectable 0.5 milliGRAM(s) IV Push every 4 hours  LORazepam   Injectable 1 milliGRAM(s) IV Push once  magnesium sulfate  IVPB 2 Gram(s) IV Intermittent once  metoprolol tartrate 100 milliGRAM(s) Oral two times a day  multivitamin 1 Tablet(s) Oral daily  OLANZapine Injectable 2.5 milliGRAM(s) IntraMuscular every 6 hours  potassium chloride    Tablet ER 40 milliEquivalent(s) Oral every 4 hours  rivaroxaban 20 milliGRAM(s) Oral with dinner  thiamine 100 milliGRAM(s) Oral daily    MEDICATIONS  (PRN):  LORazepam   Injectable 1 milliGRAM(s) IV Push every 2 hours PRN CIWA-Ar score increase by 2 points and a total score of 7 or less  LORazepam   Injectable 1 milliGRAM(s) IV Push every 1 hour PRN CIWA-Ar score 8 or greater      Vital Signs Last 24 Hrs  T(C): 36.7 (28 Jun 2021 05:00), Max: 36.7 (28 Jun 2021 05:00)  T(F): 98 (28 Jun 2021 05:00), Max: 98 (28 Jun 2021 05:00)  HR: 125 (28 Jun 2021 06:57) (91 - 136)  BP: 132/92 (28 Jun 2021 06:57) (126/85 - 135/93)  RR: 18 (28 Jun 2021 05:00) (17 - 19)  SpO2: 96% (28 Jun 2021 05:00) (94% - 96%)    PHYSICAL EXAM  General: adult overweight woman  HEENT:  left eye periorbital bruising  Neck: supple  CV: normal S1S2 with no murmur rubs or gallops  Lungs: clear to auscultation, no wheezes, no rhales  Abdomen: soft non-tender non-distended, no hepato/splenomegaly  Ext:  + bilateral edema  Skin: no rashes and no petichiae  Neuro:  arousable; babbles incoherently at times; able to follow simple commands      LABS:                        12.8   10.63 )-----------( 221      ( 28 Jun 2021 07:25 )             38.8     06-28    143  |  105  |  7   ----------------------------<  123<H>  3.4<L>   |  28  |  0.58    Ca    8.6      28 Jun 2021 07:25  Phos  3.2     06-28  Mg     1.5     06-28    TPro  6.6  /  Alb  3.2<L>  /  TBili  0.7  /  DBili  .30<H>  /  AST  37  /  ALT  23  /  AlkPhos  97  06-26

## 2021-06-28 NOTE — CHART NOTE - NSCHARTNOTEFT_GEN_A_CORE
Spoke with HCP who visited patient's house today.   House is unclean and unkempt. Multiple empty hard liquor bottles were found.  Completed MOLST with HCP. Patient DNR/DNI.  Case d/w SW. Patient to go to long term SNF when medically ready.

## 2021-06-29 LAB
ANION GAP SERPL CALC-SCNC: 10 MMOL/L — SIGNIFICANT CHANGE UP (ref 5–17)
APPEARANCE UR: CLEAR — SIGNIFICANT CHANGE UP
BILIRUB UR-MCNC: NEGATIVE — SIGNIFICANT CHANGE UP
BUN SERPL-MCNC: 7 MG/DL — SIGNIFICANT CHANGE UP (ref 7–23)
CALCIUM SERPL-MCNC: 8.6 MG/DL — SIGNIFICANT CHANGE UP (ref 8.5–10.1)
CHLORIDE SERPL-SCNC: 107 MMOL/L — SIGNIFICANT CHANGE UP (ref 96–108)
CO2 SERPL-SCNC: 24 MMOL/L — SIGNIFICANT CHANGE UP (ref 22–31)
COLOR SPEC: YELLOW — SIGNIFICANT CHANGE UP
CREAT SERPL-MCNC: 0.56 MG/DL — SIGNIFICANT CHANGE UP (ref 0.5–1.3)
DIFF PNL FLD: ABNORMAL
GLUCOSE SERPL-MCNC: 133 MG/DL — HIGH (ref 70–99)
GLUCOSE UR QL: NEGATIVE — SIGNIFICANT CHANGE UP
HCT VFR BLD CALC: 37.9 % — SIGNIFICANT CHANGE UP (ref 34.5–45)
HGB BLD-MCNC: 12.6 G/DL — SIGNIFICANT CHANGE UP (ref 11.5–15.5)
KETONES UR-MCNC: ABNORMAL
LEUKOCYTE ESTERASE UR-ACNC: NEGATIVE — SIGNIFICANT CHANGE UP
MAGNESIUM SERPL-MCNC: 1.6 MG/DL — SIGNIFICANT CHANGE UP (ref 1.6–2.6)
MCHC RBC-ENTMCNC: 33.2 GM/DL — SIGNIFICANT CHANGE UP (ref 32–36)
MCHC RBC-ENTMCNC: 33.4 PG — SIGNIFICANT CHANGE UP (ref 27–34)
MCV RBC AUTO: 100.5 FL — HIGH (ref 80–100)
NITRITE UR-MCNC: NEGATIVE — SIGNIFICANT CHANGE UP
NRBC # BLD: 0 /100 WBCS — SIGNIFICANT CHANGE UP (ref 0–0)
PH UR: 8 — SIGNIFICANT CHANGE UP (ref 5–8)
PLATELET # BLD AUTO: 246 K/UL — SIGNIFICANT CHANGE UP (ref 150–400)
POTASSIUM SERPL-MCNC: 3.5 MMOL/L — SIGNIFICANT CHANGE UP (ref 3.5–5.3)
POTASSIUM SERPL-SCNC: 3.5 MMOL/L — SIGNIFICANT CHANGE UP (ref 3.5–5.3)
PROT UR-MCNC: 30 MG/DL
RBC # BLD: 3.77 M/UL — LOW (ref 3.8–5.2)
RBC # FLD: 16.1 % — HIGH (ref 10.3–14.5)
SODIUM SERPL-SCNC: 141 MMOL/L — SIGNIFICANT CHANGE UP (ref 135–145)
SP GR SPEC: 1.01 — SIGNIFICANT CHANGE UP (ref 1.01–1.02)
UROBILINOGEN FLD QL: 1
WBC # BLD: 13.08 K/UL — HIGH (ref 3.8–10.5)
WBC # FLD AUTO: 13.08 K/UL — HIGH (ref 3.8–10.5)

## 2021-06-29 RX ORDER — DIGOXIN 250 MCG
250 TABLET ORAL DAILY
Refills: 0 | Status: DISCONTINUED | OUTPATIENT
Start: 2021-06-30 | End: 2021-07-08

## 2021-06-29 RX ORDER — ACETAMINOPHEN 500 MG
650 TABLET ORAL EVERY 6 HOURS
Refills: 0 | Status: DISCONTINUED | OUTPATIENT
Start: 2021-06-29 | End: 2021-07-08

## 2021-06-29 RX ORDER — OLANZAPINE 15 MG/1
2.5 TABLET, FILM COATED ORAL EVERY 6 HOURS
Refills: 0 | Status: DISCONTINUED | OUTPATIENT
Start: 2021-06-29 | End: 2021-07-08

## 2021-06-29 RX ORDER — DIGOXIN 250 MCG
250 TABLET ORAL ONCE
Refills: 0 | Status: COMPLETED | OUTPATIENT
Start: 2021-06-29 | End: 2021-06-29

## 2021-06-29 RX ADMIN — Medication 100 MILLIGRAM(S): at 17:32

## 2021-06-29 RX ADMIN — Medication 100 MILLIGRAM(S): at 05:46

## 2021-06-29 RX ADMIN — Medication 10 MG/HR: at 03:03

## 2021-06-29 RX ADMIN — Medication 360 MILLIGRAM(S): at 05:46

## 2021-06-29 RX ADMIN — Medication 1 MILLIGRAM(S): at 12:36

## 2021-06-29 RX ADMIN — Medication 650 MILLIGRAM(S): at 06:25

## 2021-06-29 RX ADMIN — Medication 650 MILLIGRAM(S): at 05:46

## 2021-06-29 RX ADMIN — Medication 0.5 MILLIGRAM(S): at 14:34

## 2021-06-29 RX ADMIN — Medication 0.5 MILLIGRAM(S): at 10:10

## 2021-06-29 RX ADMIN — Medication 250 MICROGRAM(S): at 08:49

## 2021-06-29 RX ADMIN — Medication 0.5 MILLIGRAM(S): at 21:20

## 2021-06-29 RX ADMIN — Medication 0.5 MILLIGRAM(S): at 17:32

## 2021-06-29 RX ADMIN — CEFTRIAXONE 100 MILLIGRAM(S): 500 INJECTION, POWDER, FOR SOLUTION INTRAMUSCULAR; INTRAVENOUS at 08:16

## 2021-06-29 RX ADMIN — Medication 1 APPLICATION(S): at 17:37

## 2021-06-29 RX ADMIN — Medication 0.5 MILLIGRAM(S): at 02:10

## 2021-06-29 RX ADMIN — Medication 1 APPLICATION(S): at 05:48

## 2021-06-29 RX ADMIN — OLANZAPINE 2.5 MILLIGRAM(S): 15 TABLET, FILM COATED ORAL at 12:35

## 2021-06-29 RX ADMIN — Medication 1 TABLET(S): at 12:35

## 2021-06-29 RX ADMIN — Medication 100 MILLIGRAM(S): at 12:36

## 2021-06-29 RX ADMIN — RIVAROXABAN 20 MILLIGRAM(S): KIT at 17:32

## 2021-06-29 NOTE — PROGRESS NOTE ADULT - SUBJECTIVE AND OBJECTIVE BOX
Chief Complaint: Tachycardia    Interval Events: Remains confused/agitated.    Review of Systems:  General: No fevers, chills, weight loss or gain  Skin: No rashes, color changes  Cardiovascular: No chest pain, orthopnea  Respiratory: No shortness of breath, cough  Gastrointestinal: No nausea, abdominal pain  Genitourinary: No incontinence, pain with urination  Musculoskeletal: No pain, swelling, decreased range of motion  Neurological: No headache, weakness  Psychiatric: No depression, anxiety  Endocrine: No weight loss or gain, increased thirst  All other systems are comprehensively negative.    Physical Exam:  Vital Signs Last 24 Hrs  T(C): 38.7 (29 Jun 2021 05:15), Max: 38.7 (29 Jun 2021 05:15)  T(F): 101.7 (29 Jun 2021 05:15), Max: 101.7 (29 Jun 2021 05:15)  HR: 105 (29 Jun 2021 05:15) (104 - 108)  BP: 129/86 (29 Jun 2021 05:15) (113/66 - 129/86)  BP(mean): --  RR: 18 (29 Jun 2021 05:15) (16 - 18)  SpO2: 94% (29 Jun 2021 05:15) (91% - 94%)  General: Confused  HEENT: MMM  Neck: No JVD, no carotid bruit  Lungs: CTAB  CV: Irregular, nl S1/S2, no M/R/G  Abdomen: S/NT/ND, +BS  Extremities: No LE edema, no cyanosis  Neuro: AAOx0, non-focal  Skin: No rash    Labs:             06-29    141  |  107  |  7   ----------------------------<  133<H>  3.5   |  24  |  0.56    Ca    8.6      29 Jun 2021 07:10  Phos  3.2     06-28  Mg     1.6     06-29                          12.6   13.08 )-----------( 246      ( 29 Jun 2021 07:10 )             37.9         Telemetry: -110

## 2021-06-29 NOTE — PROGRESS NOTE ADULT - SUBJECTIVE AND OBJECTIVE BOX
Patient is a 71y old  Female who presents with a chief complaint of Dizzy (28 Jun 2021 09:29)      INTERVAL HPI/OVERNIGHT EVENTS: Patient seen and examined. NAD. No complaints. Still lethargic.    Vital Signs Last 24 Hrs  T(C): 38.7 (29 Jun 2021 05:15), Max: 38.7 (29 Jun 2021 05:15)  T(F): 101.7 (29 Jun 2021 05:15), Max: 101.7 (29 Jun 2021 05:15)  HR: 105 (29 Jun 2021 05:15) (104 - 108)  BP: 129/86 (29 Jun 2021 05:15) (113/66 - 129/86)  BP(mean): --  RR: 18 (29 Jun 2021 05:15) (16 - 18)  SpO2: 94% (29 Jun 2021 05:15) (91% - 94%)    06-29    141  |  107  |  7   ----------------------------<  133<H>  3.5   |  24  |  0.56    Ca    8.6      29 Jun 2021 07:10  Phos  3.2     06-28  Mg     1.6     06-29                            12.6   13.08 )-----------( 246      ( 29 Jun 2021 07:10 )             37.9       CAPILLARY BLOOD GLUCOSE                  acetaminophen   Tablet .. 650 milliGRAM(s) Oral every 6 hours PRN  ammonium lactate 12% Lotion 1 Application(s) Topical two times a day  diltiazem    milliGRAM(s) Oral daily  folic acid 1 milliGRAM(s) Oral daily  LORazepam   Injectable 1 milliGRAM(s) IV Push every 2 hours PRN  LORazepam   Injectable 1 milliGRAM(s) IV Push every 1 hour PRN  LORazepam   Injectable 0.5 milliGRAM(s) IV Push every 4 hours  metoprolol tartrate 100 milliGRAM(s) Oral two times a day  multivitamin 1 Tablet(s) Oral daily  OLANZapine Injectable 2.5 milliGRAM(s) IntraMuscular every 6 hours  rivaroxaban 20 milliGRAM(s) Oral with dinner  thiamine 100 milliGRAM(s) Oral daily          REVIEW OF SYSTEMS:  CONSTITUTIONAL: No fever, no weight loss, or no fatigue  NECK: No pain, no stiffness  RESPIRATORY: No cough, no wheezing, no chills, no hemoptysis, No shortness of breath  CARDIOVASCULAR: No chest pain, no palpitations, no dizziness, no leg swelling  GASTROINTESTINAL: No abdominal pain. No nausea, no vomiting, no hematemesis; No diarrhea, no constipation. No melena, no hematochezia.  GENITOURINARY: No dysuria, no frequency, no hematuria, no incontinence  NEUROLOGICAL: No headaches, no loss of strength, no numbness, no tremors  SKIN: No itching, no burning  MUSCULOSKELETAL: No joint pain, no swelling; No muscle, no back, no extremity pain  PSYCHIATRIC: No depression, no mood swings,   HEME/LYMPH: No easy bruising, no bleeding gums  ALLERY AND IMMUNOLOGIC: No hives       Consultant(s) Notes Reviewed:  [X] YES  [ ] NO    PHYSICAL EXAM:  GENERAL: NAD  HEAD:  Atraumatic, Normocephalic  EYES: EOMI, PERRLA, conjunctiva and sclera clear  ENMT: No tonsillar erythema, exudates, or enlargement; Moist mucous membranes  NECK: Supple, No JVD  NERVOUS SYSTEM:  lethargic  CHEST/LUNG: Clear to auscultation bilaterally; No rales, rhonchi, wheezing,  HEART: Regular rate and rhythm  ABDOMEN: Soft, Nontender, Nondistended; Bowel sounds present  EXTREMITIES:  No clubbing, cyanosis, or edema  LYMPH: No lymphadenopathy noted  SKIN: No rashes      Advanced care planning discussed with patient/family [X] YES   [ ] NO    Advanced care planning discussed with patient/family. Patient's health status was discussed. All appropriate changes have been made regarding patient's end-of-life care. Advanced care planning forms reviewed/discussed/completed.  20 minutes spent.

## 2021-06-29 NOTE — PROGRESS NOTE ADULT - PROBLEM SELECTOR PLAN 2
Unclear etiology  Possibly secondary to Etoh withdrawal, UTI, neurodegenerative disorder from hemochromatosis  HCP states that she went to patient's house -- saw many empty bottles of hard liquor  Neuro/Psych f/u  Detox consult noted  Continue CIWA protocol

## 2021-06-29 NOTE — PROGRESS NOTE ADULT - SUBJECTIVE AND OBJECTIVE BOX
Date/Time Patient Seen:  		  Referring MD:   Data Reviewed	       Patient is a 71y old  Female who presents with a chief complaint of Dizzy (28 Jun 2021 11:16)      Subjective/HPI     PAST MEDICAL & SURGICAL HISTORY:  HTN (hypertension), benign    CHF (congestive heart failure)    Pulmonary embolism    HLD (hyperlipidemia)    HTN (hypertension)    Hemochromatosis    S/P D&amp;C (status post dilation and curettage)  with IUD insertion in 2013    No significant past surgical history          Medication list         MEDICATIONS  (STANDING):  ammonium lactate 12% Lotion 1 Application(s) Topical two times a day  cefTRIAXone   IVPB      cefTRIAXone   IVPB 1000 milliGRAM(s) IV Intermittent every 24 hours  diltiazem    milliGRAM(s) Oral daily  diltiazem Infusion 10 mG/Hr (10 mL/Hr) IV Continuous <Continuous>  folic acid 1 milliGRAM(s) Oral daily  LORazepam   Injectable 0.5 milliGRAM(s) IV Push every 4 hours  metoprolol tartrate 100 milliGRAM(s) Oral two times a day  multivitamin 1 Tablet(s) Oral daily  OLANZapine Injectable 2.5 milliGRAM(s) IntraMuscular every 6 hours  rivaroxaban 20 milliGRAM(s) Oral with dinner  thiamine 100 milliGRAM(s) Oral daily    MEDICATIONS  (PRN):  acetaminophen   Tablet .. 650 milliGRAM(s) Oral every 6 hours PRN Temp greater or equal to 38C (100.4F)  LORazepam   Injectable 1 milliGRAM(s) IV Push every 2 hours PRN CIWA-Ar score increase by 2 points and a total score of 7 or less  LORazepam   Injectable 1 milliGRAM(s) IV Push every 1 hour PRN CIWA-Ar score 8 or greater         Vitals log        ICU Vital Signs Last 24 Hrs  T(C): 38.7 (29 Jun 2021 05:15), Max: 38.7 (29 Jun 2021 05:15)  T(F): 101.7 (29 Jun 2021 05:15), Max: 101.7 (29 Jun 2021 05:15)  HR: 105 (29 Jun 2021 05:15) (104 - 125)  BP: 129/86 (29 Jun 2021 05:15) (113/66 - 132/92)  BP(mean): --  ABP: --  ABP(mean): --  RR: 18 (29 Jun 2021 05:15) (16 - 18)  SpO2: 94% (29 Jun 2021 05:15) (91% - 94%)           Input and Output:  I&O's Detail    27 Jun 2021 07:01  -  28 Jun 2021 07:00  --------------------------------------------------------  IN:    Diltiazem: 230 mL    IV PiggyBack: 300 mL  Total IN: 530 mL    OUT:    Voided (mL): 750 mL  Total OUT: 750 mL    Total NET: -220 mL      28 Jun 2021 07:01  -  29 Jun 2021 05:53  --------------------------------------------------------  IN:  Total IN: 0 mL    OUT:    Voided (mL): 1000 mL  Total OUT: 1000 mL    Total NET: -1000 mL          Lab Data                        12.8   10.63 )-----------( 221      ( 28 Jun 2021 07:25 )             38.8     06-28    143  |  105  |  7   ----------------------------<  123<H>  3.4<L>   |  28  |  0.58    Ca    8.6      28 Jun 2021 07:25  Phos  3.2     06-28  Mg     1.5     06-28              Review of Systems	      Objective     Physical Examination    heart s1s2  lung dec BS  abd soft      Pertinent Lab findings & Imaging      Ino:  NO   Adequate UO     I&O's Detail    27 Jun 2021 07:01  -  28 Jun 2021 07:00  --------------------------------------------------------  IN:    Diltiazem: 230 mL    IV PiggyBack: 300 mL  Total IN: 530 mL    OUT:    Voided (mL): 750 mL  Total OUT: 750 mL    Total NET: -220 mL      28 Jun 2021 07:01  -  29 Jun 2021 05:53  --------------------------------------------------------  IN:  Total IN: 0 mL    OUT:    Voided (mL): 1000 mL  Total OUT: 1000 mL    Total NET: -1000 mL               Discussed with:     Cultures:	        Radiology

## 2021-06-29 NOTE — PROGRESS NOTE ADULT - SUBJECTIVE AND OBJECTIVE BOX
Neurology follow up note    JOSAFAT [id number]      Interval History:    Patient resting in bed events noted received medications over night     MEDICATIONS    acetaminophen   Tablet .. 650 milliGRAM(s) Oral every 6 hours PRN  ammonium lactate 12% Lotion 1 Application(s) Topical two times a day  diltiazem    milliGRAM(s) Oral daily  folic acid 1 milliGRAM(s) Oral daily  LORazepam   Injectable 1 milliGRAM(s) IV Push every 2 hours PRN  LORazepam   Injectable 1 milliGRAM(s) IV Push every 1 hour PRN  LORazepam   Injectable 0.5 milliGRAM(s) IV Push every 4 hours  metoprolol tartrate 100 milliGRAM(s) Oral two times a day  multivitamin 1 Tablet(s) Oral daily  OLANZapine Injectable 2.5 milliGRAM(s) IntraMuscular every 6 hours  rivaroxaban 20 milliGRAM(s) Oral with dinner  thiamine 100 milliGRAM(s) Oral daily      Allergies    No Known Allergies    Intolerances            Vital Signs Last 24 Hrs  T(C): 38.7 (29 Jun 2021 05:15), Max: 38.7 (29 Jun 2021 05:15)  T(F): 101.7 (29 Jun 2021 05:15), Max: 101.7 (29 Jun 2021 05:15)  HR: 105 (29 Jun 2021 05:15) (104 - 108)  BP: 129/86 (29 Jun 2021 05:15) (113/66 - 129/86)  BP(mean): --  RR: 18 (29 Jun 2021 05:15) (16 - 18)  SpO2: 94% (29 Jun 2021 05:15) (91% - 94%)    REVIEW OF SYSTEMS:  limited lethargic     PHYSICAL EXAMINATION:  HEENT:  Head:  Positive trauma was noted over the left eye.  No scleral icterus was noted.  Ears:  Hearing bilaterally intact.  NECK:  Supple.  RESPIRATORY:  Good air entry bilaterally.  CARDIOVASCULAR:  S1 and S2 heard.  ABDOMEN:  Soft and nontender.  EXTREMITIES:  No clubbing was noted.      NEUROLOGIC:  The patient is sleeping in bed \  did not follow commands  Extraocular movements were intact, full visual field.    Speech mumbles .  Smile was symmetric.  Motor:  Bilateral upper were 4/5, bilateral lower were 3-/5.                     LABS:  CBC Full  -  ( 29 Jun 2021 07:10 )  WBC Count : 13.08 K/uL  RBC Count : 3.77 M/uL  Hemoglobin : 12.6 g/dL  Hematocrit : 37.9 %  Platelet Count - Automated : 246 K/uL  Mean Cell Volume : 100.5 fl  Mean Cell Hemoglobin : 33.4 pg  Mean Cell Hemoglobin Concentration : 33.2 gm/dL  Auto Neutrophil # : x  Auto Lymphocyte # : x  Auto Monocyte # : x  Auto Eosinophil # : x  Auto Basophil # : x  Auto Neutrophil % : x  Auto Lymphocyte % : x  Auto Monocyte % : x  Auto Eosinophil % : x  Auto Basophil % : x      06-29    141  |  107  |  7   ----------------------------<  133<H>  3.5   |  24  |  0.56    Ca    8.6      29 Jun 2021 07:10  Phos  3.2     06-28  Mg     1.6     06-29      Hemoglobin A1C:       Vitamin B12         RADIOLOGY    ANALYSIS AND PLAN:  This is a 71-year-old with an episode of altered mental status.  Suspect patients AMS is possible from the history of hemochromatosis with oxidative stress and iron leading to a chronic neurodegenerative deterioration in overall cognitive status which unfortunately will be progressive. Questionable, the patient has an worsening mild cognitive impairment that is progressively becoming worse with hospital setting leading to the hospital delirium, possible metabolic encephalopathy from possible UTI   antibiotics as needed   For history of atrial fibrillation, anticoagulation as needed.  For history of hypertension, monitor systolic blood pressure.  For history of hyperlipidemia, condition the patient on statin.  Fall precautions.  monitor oxygen and CO2 levels as needed  recommend Psychiatry follow-up in regards to control of symptoms.   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 she understands that she will deteriorate cognitive wise as time goes on will most likely need placement       Greater than 45 minutes of time was spent with the patient, plan of care, reviewing data, with greater than 50% of the visit was spent counseling and/or coordinating care with multidisciplinary healthcare team

## 2021-06-30 LAB
ANION GAP SERPL CALC-SCNC: 13 MMOL/L — SIGNIFICANT CHANGE UP (ref 5–17)
BUN SERPL-MCNC: 11 MG/DL — SIGNIFICANT CHANGE UP (ref 7–23)
CALCIUM SERPL-MCNC: 8.7 MG/DL — SIGNIFICANT CHANGE UP (ref 8.5–10.1)
CHLORIDE SERPL-SCNC: 107 MMOL/L — SIGNIFICANT CHANGE UP (ref 96–108)
CO2 SERPL-SCNC: 21 MMOL/L — LOW (ref 22–31)
CREAT SERPL-MCNC: 0.58 MG/DL — SIGNIFICANT CHANGE UP (ref 0.5–1.3)
CULTURE RESULTS: NO GROWTH — SIGNIFICANT CHANGE UP
GLUCOSE SERPL-MCNC: 119 MG/DL — HIGH (ref 70–99)
MAGNESIUM SERPL-MCNC: 1.7 MG/DL — SIGNIFICANT CHANGE UP (ref 1.6–2.6)
POTASSIUM SERPL-MCNC: 3.4 MMOL/L — LOW (ref 3.5–5.3)
POTASSIUM SERPL-SCNC: 3.4 MMOL/L — LOW (ref 3.5–5.3)
SODIUM SERPL-SCNC: 141 MMOL/L — SIGNIFICANT CHANGE UP (ref 135–145)
SPECIMEN SOURCE: SIGNIFICANT CHANGE UP

## 2021-06-30 PROCEDURE — 71250 CT THORAX DX C-: CPT | Mod: 26

## 2021-06-30 RX ORDER — PIPERACILLIN AND TAZOBACTAM 4; .5 G/20ML; G/20ML
3.38 INJECTION, POWDER, LYOPHILIZED, FOR SOLUTION INTRAVENOUS EVERY 8 HOURS
Refills: 0 | Status: DISCONTINUED | OUTPATIENT
Start: 2021-06-30 | End: 2021-07-03

## 2021-06-30 RX ORDER — POTASSIUM CHLORIDE 20 MEQ
40 PACKET (EA) ORAL EVERY 4 HOURS
Refills: 0 | Status: COMPLETED | OUTPATIENT
Start: 2021-06-30 | End: 2021-06-30

## 2021-06-30 RX ORDER — PIPERACILLIN AND TAZOBACTAM 4; .5 G/20ML; G/20ML
3.38 INJECTION, POWDER, LYOPHILIZED, FOR SOLUTION INTRAVENOUS ONCE
Refills: 0 | Status: COMPLETED | OUTPATIENT
Start: 2021-06-30 | End: 2021-06-30

## 2021-06-30 RX ADMIN — Medication 650 MILLIGRAM(S): at 11:59

## 2021-06-30 RX ADMIN — PIPERACILLIN AND TAZOBACTAM 200 GRAM(S): 4; .5 INJECTION, POWDER, LYOPHILIZED, FOR SOLUTION INTRAVENOUS at 21:27

## 2021-06-30 RX ADMIN — Medication 0.5 MILLIGRAM(S): at 05:55

## 2021-06-30 RX ADMIN — Medication 1 TABLET(S): at 11:58

## 2021-06-30 RX ADMIN — Medication 1 MILLIGRAM(S): at 11:58

## 2021-06-30 RX ADMIN — Medication 100 MILLIGRAM(S): at 18:00

## 2021-06-30 RX ADMIN — Medication 1 APPLICATION(S): at 18:01

## 2021-06-30 RX ADMIN — Medication 40 MILLIEQUIVALENT(S): at 11:58

## 2021-06-30 RX ADMIN — Medication 360 MILLIGRAM(S): at 05:55

## 2021-06-30 RX ADMIN — Medication 100 MILLIGRAM(S): at 05:55

## 2021-06-30 RX ADMIN — Medication 0.5 MILLIGRAM(S): at 02:17

## 2021-06-30 RX ADMIN — Medication 40 MILLIEQUIVALENT(S): at 14:56

## 2021-06-30 RX ADMIN — RIVAROXABAN 20 MILLIGRAM(S): KIT at 18:00

## 2021-06-30 RX ADMIN — Medication 650 MILLIGRAM(S): at 14:01

## 2021-06-30 RX ADMIN — Medication 1 APPLICATION(S): at 05:56

## 2021-06-30 RX ADMIN — Medication 250 MICROGRAM(S): at 05:55

## 2021-06-30 RX ADMIN — Medication 0.5 MILLIGRAM(S): at 21:28

## 2021-06-30 NOTE — PROGRESS NOTE ADULT - PROBLEM SELECTOR PLAN 3
Finished three days of rocephin  Culture data noted -- klebsiella sensitive to rocephin  Now with recurrent fever  ID consult called

## 2021-06-30 NOTE — PROGRESS NOTE ADULT - SUBJECTIVE AND OBJECTIVE BOX
Date/Time Patient Seen:  		  Referring MD:   Data Reviewed	       Patient is a 71y old  Female who presents with a chief complaint of Dizzy (29 Jun 2021 10:38)      Subjective/HPI     PAST MEDICAL & SURGICAL HISTORY:  HTN (hypertension), benign    CHF (congestive heart failure)    Pulmonary embolism    HLD (hyperlipidemia)    HTN (hypertension)    Hemochromatosis    S/P D&amp;C (status post dilation and curettage)  with IUD insertion in 2013    No significant past surgical history          Medication list         MEDICATIONS  (STANDING):  ammonium lactate 12% Lotion 1 Application(s) Topical two times a day  digoxin     Tablet 250 MICROGram(s) Oral daily  diltiazem    milliGRAM(s) Oral daily  folic acid 1 milliGRAM(s) Oral daily  LORazepam   Injectable 0.5 milliGRAM(s) IV Push every 4 hours  metoprolol tartrate 100 milliGRAM(s) Oral two times a day  multivitamin 1 Tablet(s) Oral daily  rivaroxaban 20 milliGRAM(s) Oral with dinner    MEDICATIONS  (PRN):  acetaminophen   Tablet .. 650 milliGRAM(s) Oral every 6 hours PRN Temp greater or equal to 38C (100.4F)  LORazepam   Injectable 1 milliGRAM(s) IV Push every 1 hour PRN CIWA-Ar score 8 or greater  LORazepam   Injectable 1 milliGRAM(s) IV Push every 2 hours PRN CIWA-Ar score increase by 2 points and a total score of 7 or less  OLANZapine Injectable 2.5 milliGRAM(s) IntraMuscular every 6 hours PRN agitation         Vitals log        ICU Vital Signs Last 24 Hrs  T(C): 37.6 (30 Jun 2021 06:05), Max: 37.8 (29 Jun 2021 12:35)  T(F): 99.6 (30 Jun 2021 06:05), Max: 100.1 (29 Jun 2021 12:35)  HR: 122 (30 Jun 2021 06:05) (81 - 122)  BP: 127/84 (30 Jun 2021 06:05) (105/63 - 135/82)  BP(mean): --  ABP: --  ABP(mean): --  RR: 18 (30 Jun 2021 06:05) (17 - 18)  SpO2: 94% (30 Jun 2021 06:05) (92% - 94%)           Input and Output:  I&O's Detail    28 Jun 2021 07:01  -  29 Jun 2021 07:00  --------------------------------------------------------  IN:    Diltiazem: 120 mL  Total IN: 120 mL    OUT:    Voided (mL): 1800 mL  Total OUT: 1800 mL    Total NET: -1680 mL      29 Jun 2021 07:01  -  30 Jun 2021 06:13  --------------------------------------------------------  IN:    Oral Fluid: 240 mL  Total IN: 240 mL    OUT:    Voided (mL): 50 mL  Total OUT: 50 mL    Total NET: 190 mL          Lab Data                        12.6   13.08 )-----------( 246      ( 29 Jun 2021 07:10 )             37.9     06-29    141  |  107  |  7   ----------------------------<  133<H>  3.5   |  24  |  0.56    Ca    8.6      29 Jun 2021 07:10  Phos  3.2     06-28  Mg     1.6     06-29              Review of Systems	      Objective     Physical Examination    heart s1s2  lung dec BS  abd soft      Pertinent Lab findings & Imaging      Ino:  NO   Adequate UO     I&O's Detail    28 Jun 2021 07:01  -  29 Jun 2021 07:00  --------------------------------------------------------  IN:    Diltiazem: 120 mL  Total IN: 120 mL    OUT:    Voided (mL): 1800 mL  Total OUT: 1800 mL    Total NET: -1680 mL      29 Jun 2021 07:01  -  30 Jun 2021 06:13  --------------------------------------------------------  IN:    Oral Fluid: 240 mL  Total IN: 240 mL    OUT:    Voided (mL): 50 mL  Total OUT: 50 mL    Total NET: 190 mL               Discussed with:     Cultures:	        Radiology

## 2021-06-30 NOTE — PROGRESS NOTE ADULT - SUBJECTIVE AND OBJECTIVE BOX
Chief Complaint: Tachycardia    Interval Events: Remains confused/agitated.    Review of Systems:  General: No fevers, chills, weight loss or gain  Skin: No rashes, color changes  Cardiovascular: No chest pain, orthopnea  Respiratory: No shortness of breath, cough  Gastrointestinal: No nausea, abdominal pain  Genitourinary: No incontinence, pain with urination  Musculoskeletal: No pain, swelling, decreased range of motion  Neurological: No headache, weakness  Psychiatric: No depression, anxiety  Endocrine: No weight loss or gain, increased thirst  All other systems are comprehensively negative.    Physical Exam:  Vital Signs Last 24 Hrs  T(C): 37.6 (30 Jun 2021 06:05), Max: 37.8 (29 Jun 2021 12:35)  T(F): 99.6 (30 Jun 2021 06:05), Max: 100.1 (29 Jun 2021 12:35)  HR: 122 (30 Jun 2021 06:05) (81 - 122)  BP: 127/84 (30 Jun 2021 06:05) (105/63 - 135/82)  BP(mean): --  RR: 18 (30 Jun 2021 06:05) (17 - 18)  SpO2: 94% (30 Jun 2021 06:05) (92% - 94%)  General: Confused  HEENT: MMM  Neck: No JVD, no carotid bruit  Lungs: CTAB  CV: Irregular, nl S1/S2, no M/R/G  Abdomen: S/NT/ND, +BS  Extremities: No LE edema, no cyanosis  Neuro: AAOx1, non-focal  Skin: No rash    Labs:             06-30    141  |  107  |  11  ----------------------------<  119<H>  3.4<L>   |  21<L>  |  0.58    Ca    8.7      30 Jun 2021 07:32  Mg     1.7     06-30                          12.6   13.08 )-----------( 246      ( 29 Jun 2021 07:10 )             37.9         Telemetry: AF

## 2021-06-30 NOTE — PROGRESS NOTE ADULT - SUBJECTIVE AND OBJECTIVE BOX
Neurology follow up note    JOSAFAT [id number]      Interval History:    Patient resting in bed     MEDICATIONS    acetaminophen   Tablet .. 650 milliGRAM(s) Oral every 6 hours PRN  ammonium lactate 12% Lotion 1 Application(s) Topical two times a day  digoxin     Tablet 250 MICROGram(s) Oral daily  diltiazem    milliGRAM(s) Oral daily  folic acid 1 milliGRAM(s) Oral daily  LORazepam   Injectable 1 milliGRAM(s) IV Push every 1 hour PRN  LORazepam   Injectable 1 milliGRAM(s) IV Push every 2 hours PRN  LORazepam   Injectable 0.5 milliGRAM(s) IV Push every 8 hours  metoprolol tartrate 100 milliGRAM(s) Oral two times a day  multivitamin 1 Tablet(s) Oral daily  OLANZapine Injectable 2.5 milliGRAM(s) IntraMuscular every 6 hours PRN  potassium chloride    Tablet ER 40 milliEquivalent(s) Oral every 4 hours  rivaroxaban 20 milliGRAM(s) Oral with dinner      Allergies    No Known Allergies    Intolerances            Vital Signs Last 24 Hrs  T(C): 39.2 (2021 11:30), Max: 39.2 (2021 11:30)  T(F): 102.5 (2021 11:30), Max: 102.5 (2021 11:30)  HR: 92 (2021 11:30) (81 - 122)  BP: 128/73 (2021 11:30) (105/63 - 135/82)  BP(mean): --  RR: 16 (2021 11:30) (16 - 18)  SpO2: 92% (2021 11:30) (92% - 94%)    REVIEW OF SYSTEMS:  limited feels ok     PHYSICAL EXAMINATION:  HEENT:  Head:  Positive trauma was noted over the left eye.  No scleral icterus was noted.  Ears:  Hearing bilaterally intact.  NECK:  Supple.  RESPIRATORY:  Good air entry bilaterally.  CARDIOVASCULAR:  S1 and S2 heard.  ABDOMEN:  Soft and nontender.  EXTREMITIES:  No clubbing was noted.      NEUROLOGIC:  The patient is sleeping in bed arouseable to stimuli  [facility]  2021     follow commands  Extraocular movements were intact, full visual field.    Speech mumbles .    mile was symmetric.    Motor:  Bilateral upper were 4/5, bilateral lower were 3-/5.                     LABS:  CBC Full  -  ( 2021 07:10 )  WBC Count : 13.08 K/uL  RBC Count : 3.77 M/uL  Hemoglobin : 12.6 g/dL  Hematocrit : 37.9 %  Platelet Count - Automated : 246 K/uL  Mean Cell Volume : 100.5 fl  Mean Cell Hemoglobin : 33.4 pg  Mean Cell Hemoglobin Concentration : 33.2 gm/dL  Auto Neutrophil # : x  Auto Lymphocyte # : x  Auto Monocyte # : x  Auto Eosinophil # : x  Auto Basophil # : x  Auto Neutrophil % : x  Auto Lymphocyte % : x  Auto Monocyte % : x  Auto Eosinophil % : x  Auto Basophil % : x    Urinalysis Basic - ( 2021 11:08 )    Color: Yellow / Appearance: Clear / S.010 / pH: x  Gluc: x / Ketone: Small  / Bili: Negative / Urobili: 1   Blood: x / Protein: 30 mg/dL / Nitrite: Negative   Leuk Esterase: Negative / RBC: 25-50 /HPF / WBC 0-2   Sq Epi: x / Non Sq Epi: Occasional / Bacteria: Occasional          141  |  107  |  11  ----------------------------<  119<H>  3.4<L>   |  21<L>  |  0.58    Ca    8.7      2021 07:32  Mg     1.7           Hemoglobin A1C:       Vitamin B12         RADIOLOGY  ANALYSIS AND PLAN:  This is a 71-year-old with an episode of altered mental status.  Suspect patients AMS is possible from the history of hemochromatosis with oxidative stress and iron leading to a chronic neurodegenerative deterioration in overall cognitive status which unfortunately will be progressive. Questionable, the patient has an worsening mild cognitive impairment that is progressively becoming worse with hospital setting leading to the hospital delirium, possible metabolic encephalopathy from possible UTI   antibiotics as needed   For history of atrial fibrillation, anticoagulation as needed.  For history of hypertension, monitor systolic blood pressure.  For history of hyperlipidemia, condition the patient on statin.  Fall precautions.  monitor oxygen and CO2 levels as needed  recommend Psychiatry follow-up in regards to control of symptoms.   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 she understands that she will deteriorate cognitive wise as time goes on will most likely need placement       Greater than 42 minutes of time was spent with the patient, plan of care, reviewing data, with greater than 50% of the visit was spent counseling and/or coordinating care with multidisciplinary healthcare team

## 2021-06-30 NOTE — PROGRESS NOTE ADULT - PROBLEM SELECTOR PLAN 1
Off iv cardizem drip  Continue Metoprolol and po cardizem  On digoxin now  ECHO noted  Continue Xarelto  TSH normal on admission -- now slightly up secondary to acute illness  Cardio consult noted  Further work-up/management pending clinical course.

## 2021-06-30 NOTE — PROGRESS NOTE ADULT - SUBJECTIVE AND OBJECTIVE BOX
Patient is a 71y old  Female who presents with a chief complaint of Dizzy (2021 06:12)      INTERVAL HPI/OVERNIGHT EVENTS: Patient seen and examined. NAD. No complaints.    Vital Signs Last 24 Hrs  T(C): 37.6 (2021 06:05), Max: 37.8 (2021 12:35)  T(F): 99.6 (2021 06:05), Max: 100.1 (2021 12:35)  HR: 122 (2021 06:05) (81 - 122)  BP: 127/84 (2021 06:05) (105/63 - 135/82)  BP(mean): --  RR: 18 (2021 06:05) (17 - 18)  SpO2: 94% (2021 06:05) (92% - 94%)        141  |  107  |  11  ----------------------------<  119<H>  3.4<L>   |  21<L>  |  0.58    Ca    8.7      2021 07:32  Mg     1.7                                 12.6   13.08 )-----------( 246      ( 2021 07:10 )             37.9       CAPILLARY BLOOD GLUCOSE        Urinalysis Basic - ( 2021 11:08 )    Color: Yellow / Appearance: Clear / S.010 / pH: x  Gluc: x / Ketone: Small  / Bili: Negative / Urobili: 1   Blood: x / Protein: 30 mg/dL / Nitrite: Negative   Leuk Esterase: Negative / RBC: 25-50 /HPF / WBC 0-2   Sq Epi: x / Non Sq Epi: Occasional / Bacteria: Occasional              acetaminophen   Tablet .. 650 milliGRAM(s) Oral every 6 hours PRN  ammonium lactate 12% Lotion 1 Application(s) Topical two times a day  digoxin     Tablet 250 MICROGram(s) Oral daily  diltiazem    milliGRAM(s) Oral daily  folic acid 1 milliGRAM(s) Oral daily  LORazepam   Injectable 1 milliGRAM(s) IV Push every 1 hour PRN  LORazepam   Injectable 1 milliGRAM(s) IV Push every 2 hours PRN  LORazepam   Injectable 0.5 milliGRAM(s) IV Push every 8 hours  metoprolol tartrate 100 milliGRAM(s) Oral two times a day  multivitamin 1 Tablet(s) Oral daily  OLANZapine Injectable 2.5 milliGRAM(s) IntraMuscular every 6 hours PRN  potassium chloride    Tablet ER 40 milliEquivalent(s) Oral every 4 hours  rivaroxaban 20 milliGRAM(s) Oral with dinner              REVIEW OF SYSTEMS:  CONSTITUTIONAL: No fever, no weight loss, or no fatigue  NECK: No pain, no stiffness  RESPIRATORY: No cough, no wheezing, no chills, no hemoptysis, No shortness of breath  CARDIOVASCULAR: No chest pain, no palpitations, no dizziness, no leg swelling  GASTROINTESTINAL: No abdominal pain. No nausea, no vomiting, no hematemesis; No diarrhea, no constipation. No melena, no hematochezia.  GENITOURINARY: No dysuria, no frequency, no hematuria, no incontinence  NEUROLOGICAL: No headaches, no loss of strength, no numbness, no tremors  SKIN: No itching, no burning  MUSCULOSKELETAL: No joint pain, no swelling; No muscle, no back, no extremity pain  PSYCHIATRIC: No depression, no mood swings,   HEME/LYMPH: No easy bruising, no bleeding gums  ALLERY AND IMMUNOLOGIC: No hives       Consultant(s) Notes Reviewed:  [X] YES  [ ] NO    PHYSICAL EXAM:  GENERAL: NAD  HEAD:  Atraumatic, Normocephalic  EYES: EOMI, PERRLA, conjunctiva and sclera clear  ENMT: No tonsillar erythema, exudates, or enlargement; Moist mucous membranes  NECK: Supple, No JVD  NERVOUS SYSTEM:  Awake & alert  CHEST/LUNG: Clear to auscultation bilaterally; No rales, rhonchi, wheezing,  HEART: Regular rate and rhythm  ABDOMEN: Soft, Nontender, Nondistended; Bowel sounds present  EXTREMITIES:  No clubbing, cyanosis, or edema  LYMPH: No lymphadenopathy noted  SKIN: No rashes      Advanced care planning discussed with patient/family [X] YES   [ ] NO    Advanced care planning discussed with patient/family. Patient's health status was discussed. All appropriate changes have been made regarding patient's end-of-life care. Advanced care planning forms reviewed/discussed/completed.  20 minutes spent.

## 2021-07-01 LAB
ANION GAP SERPL CALC-SCNC: 8 MMOL/L — SIGNIFICANT CHANGE UP (ref 5–17)
BASOPHILS # BLD AUTO: 0.02 K/UL — SIGNIFICANT CHANGE UP (ref 0–0.2)
BASOPHILS NFR BLD AUTO: 0.2 % — SIGNIFICANT CHANGE UP (ref 0–2)
BUN SERPL-MCNC: 15 MG/DL — SIGNIFICANT CHANGE UP (ref 7–23)
CALCIUM SERPL-MCNC: 9 MG/DL — SIGNIFICANT CHANGE UP (ref 8.4–10.5)
CALCIUM SERPL-MCNC: 9 MG/DL — SIGNIFICANT CHANGE UP (ref 8.5–10.1)
CHLORIDE SERPL-SCNC: 112 MMOL/L — HIGH (ref 96–108)
CO2 SERPL-SCNC: 23 MMOL/L — SIGNIFICANT CHANGE UP (ref 22–31)
CREAT SERPL-MCNC: 0.59 MG/DL — SIGNIFICANT CHANGE UP (ref 0.5–1.3)
CRP SERPL-MCNC: 328 MG/L — HIGH
EOSINOPHIL # BLD AUTO: 0 K/UL — SIGNIFICANT CHANGE UP (ref 0–0.5)
EOSINOPHIL NFR BLD AUTO: 0 % — SIGNIFICANT CHANGE UP (ref 0–6)
ERYTHROCYTE [SEDIMENTATION RATE] IN BLOOD: 86 MM/HR — HIGH (ref 0–20)
GLUCOSE SERPL-MCNC: 140 MG/DL — HIGH (ref 70–99)
HCT VFR BLD CALC: 35.6 % — SIGNIFICANT CHANGE UP (ref 34.5–45)
HGB BLD-MCNC: 11.7 G/DL — SIGNIFICANT CHANGE UP (ref 11.5–15.5)
IMM GRANULOCYTES NFR BLD AUTO: 0.9 % — SIGNIFICANT CHANGE UP (ref 0–1.5)
LYMPHOCYTES # BLD AUTO: 0.65 K/UL — LOW (ref 1–3.3)
LYMPHOCYTES # BLD AUTO: 5.1 % — LOW (ref 13–44)
MACROCYTES BLD QL: SLIGHT — SIGNIFICANT CHANGE UP
MAGNESIUM SERPL-MCNC: 1.9 MG/DL — SIGNIFICANT CHANGE UP (ref 1.6–2.6)
MANUAL SMEAR VERIFICATION: SIGNIFICANT CHANGE UP
MCHC RBC-ENTMCNC: 32.9 GM/DL — SIGNIFICANT CHANGE UP (ref 32–36)
MCHC RBC-ENTMCNC: 33.1 PG — SIGNIFICANT CHANGE UP (ref 27–34)
MCV RBC AUTO: 100.6 FL — HIGH (ref 80–100)
MONOCYTES # BLD AUTO: 1.73 K/UL — HIGH (ref 0–0.9)
MONOCYTES NFR BLD AUTO: 13.6 % — SIGNIFICANT CHANGE UP (ref 2–14)
NEUTROPHILS # BLD AUTO: 10.16 K/UL — HIGH (ref 1.8–7.4)
NEUTROPHILS NFR BLD AUTO: 80.2 % — HIGH (ref 43–77)
NRBC # BLD: 0 /100 WBCS — SIGNIFICANT CHANGE UP (ref 0–0)
PLAT MORPH BLD: NORMAL — SIGNIFICANT CHANGE UP
PLATELET # BLD AUTO: 255 K/UL — SIGNIFICANT CHANGE UP (ref 150–400)
POLYCHROMASIA BLD QL SMEAR: SLIGHT — SIGNIFICANT CHANGE UP
POTASSIUM SERPL-MCNC: 3.7 MMOL/L — SIGNIFICANT CHANGE UP (ref 3.5–5.3)
POTASSIUM SERPL-SCNC: 3.7 MMOL/L — SIGNIFICANT CHANGE UP (ref 3.5–5.3)
PROCALCITONIN SERPL-MCNC: 1.42 NG/ML — HIGH (ref 0–0.04)
PTH-INTACT FLD-MCNC: 107 PG/ML — HIGH (ref 15–65)
RBC # BLD: 3.54 M/UL — LOW (ref 3.8–5.2)
RBC # FLD: 15.4 % — HIGH (ref 10.3–14.5)
RBC BLD AUTO: ABNORMAL
RHEUMATOID FACT SERPL-ACNC: 17 IU/ML — HIGH (ref 0–13)
SODIUM SERPL-SCNC: 143 MMOL/L — SIGNIFICANT CHANGE UP (ref 135–145)
TSH SERPL-MCNC: 1.48 UIU/ML — SIGNIFICANT CHANGE UP (ref 0.36–3.74)
WBC # BLD: 12.68 K/UL — HIGH (ref 3.8–10.5)
WBC # FLD AUTO: 12.68 K/UL — HIGH (ref 3.8–10.5)

## 2021-07-01 RX ORDER — ENOXAPARIN SODIUM 100 MG/ML
40 INJECTION SUBCUTANEOUS DAILY
Refills: 0 | Status: DISCONTINUED | OUTPATIENT
Start: 2021-07-01 | End: 2021-07-02

## 2021-07-01 RX ADMIN — Medication 100 MILLIGRAM(S): at 05:59

## 2021-07-01 RX ADMIN — Medication 0.5 MILLIGRAM(S): at 06:02

## 2021-07-01 RX ADMIN — Medication 1 APPLICATION(S): at 17:51

## 2021-07-01 RX ADMIN — Medication 100 MILLIGRAM(S): at 17:51

## 2021-07-01 RX ADMIN — Medication 650 MILLIGRAM(S): at 05:59

## 2021-07-01 RX ADMIN — Medication 250 MICROGRAM(S): at 06:00

## 2021-07-01 RX ADMIN — ENOXAPARIN SODIUM 40 MILLIGRAM(S): 100 INJECTION SUBCUTANEOUS at 19:44

## 2021-07-01 RX ADMIN — Medication 360 MILLIGRAM(S): at 05:59

## 2021-07-01 RX ADMIN — PIPERACILLIN AND TAZOBACTAM 25 GRAM(S): 4; .5 INJECTION, POWDER, LYOPHILIZED, FOR SOLUTION INTRAVENOUS at 06:00

## 2021-07-01 RX ADMIN — Medication 650 MILLIGRAM(S): at 06:50

## 2021-07-01 RX ADMIN — Medication 1 MILLIGRAM(S): at 15:00

## 2021-07-01 RX ADMIN — PIPERACILLIN AND TAZOBACTAM 25 GRAM(S): 4; .5 INJECTION, POWDER, LYOPHILIZED, FOR SOLUTION INTRAVENOUS at 14:58

## 2021-07-01 RX ADMIN — Medication 1 APPLICATION(S): at 06:03

## 2021-07-01 RX ADMIN — PIPERACILLIN AND TAZOBACTAM 25 GRAM(S): 4; .5 INJECTION, POWDER, LYOPHILIZED, FOR SOLUTION INTRAVENOUS at 21:15

## 2021-07-01 RX ADMIN — Medication 1 TABLET(S): at 15:01

## 2021-07-01 NOTE — PROGRESS NOTE ADULT - PROBLEM SELECTOR PLAN 3
Finished three days of rocephin  Culture data noted -- klebsiella sensitive to rocephin  Now with recurrent fever -- on empiric Zosyn  No clear source  Cultures NTD  ID consult noted

## 2021-07-01 NOTE — PROGRESS NOTE ADULT - SUBJECTIVE AND OBJECTIVE BOX
Patient is a 71y old  Female who presents with a chief complaint of Dizzy (2021 06:09)      INTERVAL HPI/OVERNIGHT EVENTS: Patient seen and examined. NAD. Lethargic    Vital Signs Last 24 Hrs  T(C): 37.8 (2021 06:48), Max: 39.2 (2021 11:30)  T(F): 100 (2021 06:48), Max: 102.5 (2021 11:30)  HR: 111 (2021 05:04) (81 - 111)  BP: 123/87 (2021 05:04) (109/68 - 128/73)  BP(mean): --  RR: 18 (2021 05:04) (16 - 18)  SpO2: 95% (2021 05:04) (92% - 100%)        143  |  112<H>  |  15  ----------------------------<  140<H>  3.7   |  23  |  0.59    Ca    9.0      2021 07:01  Mg     1.9                                 11.7   12.68 )-----------( 255      ( 2021 07:01 )             35.6       CAPILLARY BLOOD GLUCOSE        Urinalysis Basic - ( 2021 11:08 )    Color: Yellow / Appearance: Clear / S.010 / pH: x  Gluc: x / Ketone: Small  / Bili: Negative / Urobili: 1   Blood: x / Protein: 30 mg/dL / Nitrite: Negative   Leuk Esterase: Negative / RBC: 25-50 /HPF / WBC 0-2   Sq Epi: x / Non Sq Epi: Occasional / Bacteria: Occasional              acetaminophen   Tablet .. 650 milliGRAM(s) Oral every 6 hours PRN  ammonium lactate 12% Lotion 1 Application(s) Topical two times a day  digoxin     Tablet 250 MICROGram(s) Oral daily  diltiazem    milliGRAM(s) Oral daily  folic acid 1 milliGRAM(s) Oral daily  LORazepam   Injectable 1 milliGRAM(s) IV Push every 2 hours PRN  LORazepam   Injectable 1 milliGRAM(s) IV Push every 1 hour PRN  LORazepam   Injectable 0.5 milliGRAM(s) IV Push every 8 hours  metoprolol tartrate 100 milliGRAM(s) Oral two times a day  multivitamin 1 Tablet(s) Oral daily  OLANZapine Injectable 2.5 milliGRAM(s) IntraMuscular every 6 hours PRN  piperacillin/tazobactam IVPB.. 3.375 Gram(s) IV Intermittent every 8 hours  rivaroxaban 20 milliGRAM(s) Oral with dinner          REVIEW OF SYSTEMS:  CONSTITUTIONAL: + fever, no weight loss, or no fatigue  NECK: No pain, no stiffness  RESPIRATORY: No cough, no wheezing, no chills, no hemoptysis, No shortness of breath  CARDIOVASCULAR: No chest pain, no palpitations, no dizziness, no leg swelling  GASTROINTESTINAL: No abdominal pain. No nausea, no vomiting, no hematemesis; No diarrhea, no constipation. No melena, no hematochezia.  GENITOURINARY: No dysuria, no frequency, no hematuria, no incontinence  NEUROLOGICAL: No headaches, no loss of strength, no numbness, no tremors  SKIN: No itching, no burning  MUSCULOSKELETAL: No joint pain, no swelling; No muscle, no back, no extremity pain  PSYCHIATRIC: No depression, no mood swings,   HEME/LYMPH: No easy bruising, no bleeding gums  ALLERY AND IMMUNOLOGIC: No hives       Consultant(s) Notes Reviewed:  [X] YES  [ ] NO    PHYSICAL EXAM:  GENERAL: NAD  HEAD:  Atraumatic, Normocephalic  EYES: EOMI, PERRLA, conjunctiva and sclera clear  ENMT: No tonsillar erythema, exudates, or enlargement; Moist mucous membranes  NECK: Supple, No JVD  NERVOUS SYSTEM:  lethargic  CHEST/LUNG: Clear to auscultation bilaterally; No rales, rhonchi, wheezing,  HEART: Regular rate and rhythm  ABDOMEN: Soft, Nontender, Nondistended; Bowel sounds present  EXTREMITIES:  No clubbing, cyanosis, or edema  LYMPH: No lymphadenopathy noted  SKIN: No rashes      Advanced care planning discussed with patient/family [X] YES   [ ] NO    Advanced care planning discussed with patient/family. Patient's health status was discussed. All appropriate changes have been made regarding patient's end-of-life care. Advanced care planning forms reviewed/discussed/completed.  20 minutes spent.

## 2021-07-01 NOTE — PROGRESS NOTE ADULT - SUBJECTIVE AND OBJECTIVE BOX
JOSAFAT CLEMENTE is a 71yFemale , patient examined and chart reviewed.     INTERVAL HPI/ OVERNIGHT EVENTS:   More awake alert today.  Fever better. Tmax 100.5    PAST MEDICAL & SURGICAL HISTORY:  HTN (hypertension), benign  CHF (congestive heart failure)  Pulmonary embolism  HLD (hyperlipidemia)  HTN (hypertension)  Hemochromatosis  S/P D&amp;C (status post dilation and curettage)  with IUD insertion in 2013  No significant past surgical history        For details regarding the patient's social history, family history, and other miscellaneous elements, please refer the initial infectious diseases consultation and/or the admitting history and physical examination for this admission.      ROS:  CONSTITUTIONAL: + fever or chills  EYES:  Negative  blurry vision or double vision  CARDIOVASCULAR:  Negative for chest pain or palpitations  RESPIRATORY:  Negative for cough, wheezing, or SOB   GASTROINTESTINAL:  Negative for nausea, vomiting, diarrhea, constipation, or abdominal pain  GENITOURINARY:  Negative frequency, urgency or dysuria  NEUROLOGIC:  No headache, confusion, dizziness, lightheadedness  All other systems were reviewed and are negative         Current inpatient medications :    ANTIBIOTICS/RELEVANT:  piperacillin/tazobactam IVPB.. 3.375 Gram(s) IV Intermittent every 8 hours      acetaminophen   Tablet .. 650 milliGRAM(s) Oral every 6 hours PRN  ammonium lactate 12% Lotion 1 Application(s) Topical two times a day  digoxin     Tablet 250 MICROGram(s) Oral daily  diltiazem    milliGRAM(s) Oral daily  enoxaparin Injectable 40 milliGRAM(s) SubCutaneous daily  folic acid 1 milliGRAM(s) Oral daily  LORazepam   Injectable 1 milliGRAM(s) IV Push every 2 hours PRN  LORazepam   Injectable 1 milliGRAM(s) IV Push every 1 hour PRN  metoprolol tartrate 100 milliGRAM(s) Oral two times a day  multivitamin 1 Tablet(s) Oral daily  OLANZapine Injectable 2.5 milliGRAM(s) IntraMuscular every 6 hours PRN      Objective:    06-30 @ 07:01  -  07-01 @ 07:00  --------------------------------------------------------  IN: 440 mL / OUT: 925 mL / NET: -485 mL    07-01 @ 07:01  -  07-01 @ 18:17  --------------------------------------------------------  IN: 240 mL / OUT: 0 mL / NET: 240 mL      T(C): 36.3 (07-01-21 @ 15:33), Max: 38.1 (07-01-21 @ 05:04)  HR: 87 (07-01-21 @ 15:33) (81 - 111)  BP: 114/77 (07-01-21 @ 15:33) (109/68 - 123/87)  RR: 18 (07-01-21 @ 15:33) (18 - 18)  SpO2: 96% (07-01-21 @ 15:33) (95% - 100%)    Physical Exam:  General: weak no acute distress Obese  Neck: supple, trachea midline  Lungs: Decreased no wheeze/rhonchi  Cardiovascular: regular rate and rhythm, S1 S2  Abdomen: soft, nontender,  bowel sounds normal  Neurological: alert 0x 2  Extremities: Lymphedema      LABS:                          11.7   12.68 )-----------( 255      ( 01 Jul 2021 07:01 )             35.6       07-01    143  |  112<H>  |  15  ----------------------------<  140<H>  3.7   |  23  |  0.59    Ca    9.0      01 Jul 2021 07:01  Mg     1.9     07-01      MICROBIOLOGY:    Culture - Urine (collected 29 Jun 2021 15:06)  Source: .Urine Clean Catch (Midstream)  Final Report (30 Jun 2021 12:56):    No growth    Culture - Blood (collected 29 Jun 2021 12:13)  Source: .Blood Blood  Preliminary Report (30 Jun 2021 13:01):    No growth to date.    Culture - Blood (collected 29 Jun 2021 12:13)  Source: .Blood Blood  Preliminary Report (30 Jun 2021 13:01):    No growth to date.    RADIOLOGY & ADDITIONAL STUDIES:    EXAM:  CT CHEST                            PROCEDURE DATE:  06/30/2021          INTERPRETATION:  CLINICAL INFORMATION: Fever. Evaluate for pneumonia    COMPARISON: CT chest 6/24/2021    CONTRAST/COMPLICATIONS:  IV Contrast: NONE  0 cc administered 0 cc discarded  Oral Contrast: NONE  Complications: None reported at time of study completion    PROCEDURE:  CT of the Chest was performed.  Sagittal and coronal reformats were performed.    FINDINGS:    LUNGS AND AIRWAYS: Patent central airways.  Minimal dependent atelectatic changes otherwise clear lungs. No evidence for pneumonia.  PLEURA: No pleural effusion.  MEDIASTINUM AND MASOUD: No lymphadenopathy.  VESSELS: Within normal limits.  HEART: Heart size is normal. No pericardial effusion.  CHEST WALL AND LOWER NECK: Within normal limits.  VISUALIZED UPPER ABDOMEN: Within normal limits.  BONES: Degenerative changes.    IMPRESSION:    Minimal dependent atelectatic changes otherwise clear lungs. No evidence for pneumonia.    Assessment :  68 year old female with a history of HTN, HL, DVT, lymphedema, hemochromatosis admitted on 6/24/21 with rapid atrial fibrillation and UTI completed 3 days course of Rocephin. Course in hospital complicated by ETOH withdrawal.  Lethargic but arousable. On CIWA protocol.   New fevers better  CT chest neg for pneumonia + atelectasis  Cultures NGTD    Plan :   Cont empiric Zosyn  Trend temps and cbc  If remains stable and afebrile will dc antibiotics  Asp precautions  CIWA protocol per primary team      Continue with present regiment.  Appropriate use of antibiotics and adverse effects reviewed.      > 35 minutes were spent in direct patient care reviewing notes, medications ,labs data/ imaging , discussion with multidisciplinary team.    Thank you for allowing me to participate in care of your patient .    Jazmyne Gamez MD  Infectious Disease  [phone]

## 2021-07-01 NOTE — PROGRESS NOTE ADULT - SUBJECTIVE AND OBJECTIVE BOX
Neurology follow up note    JOSAFAT [id number]      Interval History:    Patient sleeping in bed     MEDICATIONS    acetaminophen   Tablet .. 650 milliGRAM(s) Oral every 6 hours PRN  ammonium lactate 12% Lotion 1 Application(s) Topical two times a day  digoxin     Tablet 250 MICROGram(s) Oral daily  diltiazem    milliGRAM(s) Oral daily  folic acid 1 milliGRAM(s) Oral daily  LORazepam   Injectable 1 milliGRAM(s) IV Push every 2 hours PRN  LORazepam   Injectable 1 milliGRAM(s) IV Push every 1 hour PRN  metoprolol tartrate 100 milliGRAM(s) Oral two times a day  multivitamin 1 Tablet(s) Oral daily  OLANZapine Injectable 2.5 milliGRAM(s) IntraMuscular every 6 hours PRN  piperacillin/tazobactam IVPB.. 3.375 Gram(s) IV Intermittent every 8 hours  rivaroxaban 20 milliGRAM(s) Oral with dinner      Allergies    No Known Allergies    Intolerances        REVIEW OF SYSTEMS:   sleeping     PHYSICAL EXAMINATION:  HEENT:  Head:  Positive trauma was noted over the left eye.  No scleral icterus was noted.  Ears:  Hearing bilaterally intact.  NECK:  Supple.  RESPIRATORY:  Good air entry bilaterally.  CARDIOVASCULAR:  S1 and S2 heard.  ABDOMEN:  Soft and nontender.  EXTREMITIES:  No clubbing was noted.      NEUROLOGIC:  The patient is sleeping in bed arouseable to stimuli  Speech mumbles .    mile was symmetric.    Motor:  Bilateral upper/lower positive movement           Vital Signs Last 24 Hrs  T(C): 37.8 (01 Jul 2021 06:48), Max: 39.2 (30 Jun 2021 11:30)  T(F): 100 (01 Jul 2021 06:48), Max: 102.5 (30 Jun 2021 11:30)  HR: 111 (01 Jul 2021 05:04) (81 - 111)  BP: 123/87 (01 Jul 2021 05:04) (109/68 - 128/73)  BP(mean): --  RR: 18 (01 Jul 2021 05:04) (16 - 18)  SpO2: 95% (01 Jul 2021 05:04) (92% - 100%)                  LABS:  CBC Full  -  ( 01 Jul 2021 07:01 )  WBC Count : 12.68 K/uL  RBC Count : 3.54 M/uL  Hemoglobin : 11.7 g/dL  Hematocrit : 35.6 %  Platelet Count - Automated : 255 K/uL  Mean Cell Volume : 100.6 fl  Mean Cell Hemoglobin : 33.1 pg  Mean Cell Hemoglobin Concentration : 32.9 gm/dL  Auto Neutrophil # : 10.16 K/uL  Auto Lymphocyte # : 0.65 K/uL  Auto Monocyte # : 1.73 K/uL  Auto Eosinophil # : 0.00 K/uL  Auto Basophil # : 0.02 K/uL  Auto Neutrophil % : 80.2 %  Auto Lymphocyte % : 5.1 %  Auto Monocyte % : 13.6 %  Auto Eosinophil % : 0.0 %  Auto Basophil % : 0.2 %      07-01    143  |  112<H>  |  15  ----------------------------<  140<H>  3.7   |  23  |  0.59    Ca    9.0      01 Jul 2021 07:01  Mg     1.9     07-01      Hemoglobin A1C:       Vitamin B12         RADIOLOGY      ANALYSIS AND PLAN:  This is a 71-year-old with an episode of altered mental status.  Suspect patients AMS is possible from the history of hemochromatosis with oxidative stress and iron leading to a chronic neurodegenerative deterioration in overall cognitive status which unfortunately will be progressive. Questionable, the patient has an worsening mild cognitive impairment that is progressively becoming worse with hospital setting leading to the hospital delirium, possible metabolic encephalopathy.  antibiotics as needed   For history of atrial fibrillation, anticoagulation as needed.  For history of hypertension, monitor systolic blood pressure.  For history of hyperlipidemia, condition the patient on statin.  Fall precautions.  monitor oxygen and CO2 levels as needed  recommend Psychiatry follow-up in regards to control of symptoms.   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 she understands that she will deteriorate cognitive wise as time goes on will most likely need placement       Greater than 34 minutes of time was spent with the patient, plan of care, reviewing data, with greater than 50% of the visit was spent counseling and/or coordinating care with multidisciplinary healthcare team

## 2021-07-01 NOTE — PROGRESS NOTE ADULT - SUBJECTIVE AND OBJECTIVE BOX
Chief Complaint: Tachycardia    Interval Events: Remains confused/agitated.    Review of Systems:  General: No fevers, chills, weight loss or gain  Skin: No rashes, color changes  Cardiovascular: No chest pain, orthopnea  Respiratory: No shortness of breath, cough  Gastrointestinal: No nausea, abdominal pain  Genitourinary: No incontinence, pain with urination  Musculoskeletal: No pain, swelling, decreased range of motion  Neurological: No headache, weakness  Psychiatric: No depression, anxiety  Endocrine: No weight loss or gain, increased thirst  All other systems are comprehensively negative.    Physical Exam:  Vital Signs Last 24 Hrs  T(C): 37.8 (01 Jul 2021 06:48), Max: 39.2 (30 Jun 2021 11:30)  T(F): 100 (01 Jul 2021 06:48), Max: 102.5 (30 Jun 2021 11:30)  HR: 111 (01 Jul 2021 05:04) (81 - 111)  BP: 123/87 (01 Jul 2021 05:04) (109/68 - 128/73)  BP(mean): --  RR: 18 (01 Jul 2021 05:04) (16 - 18)  SpO2: 95% (01 Jul 2021 05:04) (92% - 100%)  General: Confused  HEENT: MMM  Neck: No JVD, no carotid bruit  Lungs: CTAB  CV: Irregular, nl S1/S2, no M/R/G  Abdomen: S/NT/ND, +BS  Extremities: No LE edema, no cyanosis  Neuro: AAOx1, non-focal  Skin: No rash    Labs:    07-01    143  |  112<H>  |  15  ----------------------------<  140<H>  3.7   |  23  |  0.59    Ca    9.0      01 Jul 2021 07:01  Mg     1.9     07-01                          11.7   12.68 )-----------( 255      ( 01 Jul 2021 07:01 )             35.6       Telemetry: AF

## 2021-07-01 NOTE — DIETITIAN INITIAL EVALUATION ADULT. - OTHER INFO
70 y/o female adm with afib; course in hospital complicated by ETOH withdrawal/confusion. Pt with 1:1. Pt was up all night and is sleeping today. Pt did not eat today secondary to sleeping. Prior to, appetite noted to be fair to good. Last Bm 6/25. Would rx bowel regimen.

## 2021-07-01 NOTE — PROGRESS NOTE ADULT - SUBJECTIVE AND OBJECTIVE BOX
Date/Time Patient Seen:  		  Referring MD:   Data Reviewed	       Patient is a 71y old  Female who presents with a chief complaint of Dizzy (30 Jun 2021 17:40)      Subjective/HPI     PAST MEDICAL & SURGICAL HISTORY:  HTN (hypertension), benign    CHF (congestive heart failure)    Pulmonary embolism    HLD (hyperlipidemia)    HTN (hypertension)    Hemochromatosis    S/P D&amp;C (status post dilation and curettage)  with IUD insertion in 2013    No significant past surgical history          Medication list         MEDICATIONS  (STANDING):  ammonium lactate 12% Lotion 1 Application(s) Topical two times a day  digoxin     Tablet 250 MICROGram(s) Oral daily  diltiazem    milliGRAM(s) Oral daily  folic acid 1 milliGRAM(s) Oral daily  LORazepam   Injectable 0.5 milliGRAM(s) IV Push every 8 hours  metoprolol tartrate 100 milliGRAM(s) Oral two times a day  multivitamin 1 Tablet(s) Oral daily  piperacillin/tazobactam IVPB.. 3.375 Gram(s) IV Intermittent every 8 hours  rivaroxaban 20 milliGRAM(s) Oral with dinner    MEDICATIONS  (PRN):  acetaminophen   Tablet .. 650 milliGRAM(s) Oral every 6 hours PRN Temp greater or equal to 38C (100.4F)  LORazepam   Injectable 1 milliGRAM(s) IV Push every 2 hours PRN CIWA-Ar score increase by 2 points and a total score of 7 or less  LORazepam   Injectable 1 milliGRAM(s) IV Push every 1 hour PRN CIWA-Ar score 8 or greater  OLANZapine Injectable 2.5 milliGRAM(s) IntraMuscular every 6 hours PRN agitation         Vitals log        ICU Vital Signs Last 24 Hrs  T(C): 38.1 (01 Jul 2021 05:04), Max: 39.2 (30 Jun 2021 11:30)  T(F): 100.5 (01 Jul 2021 05:04), Max: 102.5 (30 Jun 2021 11:30)  HR: 111 (01 Jul 2021 05:04) (81 - 111)  BP: 123/87 (01 Jul 2021 05:04) (109/68 - 128/73)  BP(mean): --  ABP: --  ABP(mean): --  RR: 18 (01 Jul 2021 05:04) (16 - 18)  SpO2: 95% (01 Jul 2021 05:04) (92% - 100%)           Input and Output:  I&O's Detail    29 Jun 2021 07:01  -  30 Jun 2021 07:00  --------------------------------------------------------  IN:    Oral Fluid: 240 mL  Total IN: 240 mL    OUT:    Voided (mL): 350 mL  Total OUT: 350 mL    Total NET: -110 mL      30 Jun 2021 07:01  -  01 Jul 2021 06:09  --------------------------------------------------------  IN:    IV PiggyBack: 200 mL    Oral Fluid: 120 mL  Total IN: 320 mL    OUT:    Voided (mL): 225 mL  Total OUT: 225 mL    Total NET: 95 mL          Lab Data                        12.6   13.08 )-----------( 246      ( 29 Jun 2021 07:10 )             37.9     06-30    141  |  107  |  11  ----------------------------<  119<H>  3.4<L>   |  21<L>  |  0.58    Ca    8.7      30 Jun 2021 07:32  Mg     1.7     06-30              Review of Systems	      Objective     Physical Examination    heart s1s2  lung dec BS  abd soft  head nc  obese      Pertinent Lab findings & Imaging      Ino:  NO   Adequate UO     I&O's Detail    29 Jun 2021 07:01  -  30 Jun 2021 07:00  --------------------------------------------------------  IN:    Oral Fluid: 240 mL  Total IN: 240 mL    OUT:    Voided (mL): 350 mL  Total OUT: 350 mL    Total NET: -110 mL      30 Jun 2021 07:01  -  01 Jul 2021 06:09  --------------------------------------------------------  IN:    IV PiggyBack: 200 mL    Oral Fluid: 120 mL  Total IN: 320 mL    OUT:    Voided (mL): 225 mL  Total OUT: 225 mL    Total NET: 95 mL               Discussed with:     Cultures:	        Radiology

## 2021-07-01 NOTE — DIETITIAN INITIAL EVALUATION ADULT. - HEIGHT FOR BMI (INCHES)
Persistent and  severe neck pain and cervical radiculopathy , refer to Dr Sean Zayas and for a MRI of the C spine    9

## 2021-07-02 DIAGNOSIS — R79.89 OTHER SPECIFIED ABNORMAL FINDINGS OF BLOOD CHEMISTRY: ICD-10-CM

## 2021-07-02 LAB
ANA TITR SER: NEGATIVE — SIGNIFICANT CHANGE UP
ANION GAP SERPL CALC-SCNC: 10 MMOL/L — SIGNIFICANT CHANGE UP (ref 5–17)
BUN SERPL-MCNC: 17 MG/DL — SIGNIFICANT CHANGE UP (ref 7–23)
CALCIUM SERPL-MCNC: 9.3 MG/DL — SIGNIFICANT CHANGE UP (ref 8.5–10.1)
CHLORIDE SERPL-SCNC: 108 MMOL/L — SIGNIFICANT CHANGE UP (ref 96–108)
CO2 SERPL-SCNC: 24 MMOL/L — SIGNIFICANT CHANGE UP (ref 22–31)
CREAT SERPL-MCNC: 0.61 MG/DL — SIGNIFICANT CHANGE UP (ref 0.5–1.3)
GLUCOSE SERPL-MCNC: 95 MG/DL — SIGNIFICANT CHANGE UP (ref 70–99)
HCT VFR BLD CALC: 36.5 % — SIGNIFICANT CHANGE UP (ref 34.5–45)
HGB BLD-MCNC: 11.9 G/DL — SIGNIFICANT CHANGE UP (ref 11.5–15.5)
MAGNESIUM SERPL-MCNC: 2 MG/DL — SIGNIFICANT CHANGE UP (ref 1.6–2.6)
MCHC RBC-ENTMCNC: 32.6 GM/DL — SIGNIFICANT CHANGE UP (ref 32–36)
MCHC RBC-ENTMCNC: 33.2 PG — SIGNIFICANT CHANGE UP (ref 27–34)
MCV RBC AUTO: 102 FL — HIGH (ref 80–100)
NRBC # BLD: 0 /100 WBCS — SIGNIFICANT CHANGE UP (ref 0–0)
PLATELET # BLD AUTO: 276 K/UL — SIGNIFICANT CHANGE UP (ref 150–400)
POTASSIUM SERPL-MCNC: 3.6 MMOL/L — SIGNIFICANT CHANGE UP (ref 3.5–5.3)
POTASSIUM SERPL-SCNC: 3.6 MMOL/L — SIGNIFICANT CHANGE UP (ref 3.5–5.3)
RBC # BLD: 3.58 M/UL — LOW (ref 3.8–5.2)
RBC # FLD: 15.4 % — HIGH (ref 10.3–14.5)
SODIUM SERPL-SCNC: 142 MMOL/L — SIGNIFICANT CHANGE UP (ref 135–145)
WBC # BLD: 10.99 K/UL — HIGH (ref 3.8–10.5)
WBC # FLD AUTO: 10.99 K/UL — HIGH (ref 3.8–10.5)

## 2021-07-02 PROCEDURE — 99231 SBSQ HOSP IP/OBS SF/LOW 25: CPT

## 2021-07-02 RX ORDER — NYSTATIN CREAM 100000 [USP'U]/G
1 CREAM TOPICAL EVERY 12 HOURS
Refills: 0 | Status: DISCONTINUED | OUTPATIENT
Start: 2021-07-02 | End: 2021-07-08

## 2021-07-02 RX ORDER — LANOLIN ALCOHOL/MO/W.PET/CERES
5 CREAM (GRAM) TOPICAL AT BEDTIME
Refills: 0 | Status: DISCONTINUED | OUTPATIENT
Start: 2021-07-02 | End: 2021-07-08

## 2021-07-02 RX ORDER — ENOXAPARIN SODIUM 100 MG/ML
100 INJECTION SUBCUTANEOUS EVERY 12 HOURS
Refills: 0 | Status: DISCONTINUED | OUTPATIENT
Start: 2021-07-02 | End: 2021-07-03

## 2021-07-02 RX ADMIN — Medication 5 MILLIGRAM(S): at 21:47

## 2021-07-02 RX ADMIN — PIPERACILLIN AND TAZOBACTAM 25 GRAM(S): 4; .5 INJECTION, POWDER, LYOPHILIZED, FOR SOLUTION INTRAVENOUS at 05:40

## 2021-07-02 RX ADMIN — PIPERACILLIN AND TAZOBACTAM 25 GRAM(S): 4; .5 INJECTION, POWDER, LYOPHILIZED, FOR SOLUTION INTRAVENOUS at 14:25

## 2021-07-02 RX ADMIN — Medication 1 APPLICATION(S): at 05:40

## 2021-07-02 RX ADMIN — Medication 100 MILLIGRAM(S): at 17:43

## 2021-07-02 RX ADMIN — ENOXAPARIN SODIUM 100 MILLIGRAM(S): 100 INJECTION SUBCUTANEOUS at 17:42

## 2021-07-02 RX ADMIN — Medication 1 APPLICATION(S): at 17:43

## 2021-07-02 RX ADMIN — OLANZAPINE 2.5 MILLIGRAM(S): 15 TABLET, FILM COATED ORAL at 00:33

## 2021-07-02 RX ADMIN — Medication 250 MICROGRAM(S): at 05:39

## 2021-07-02 RX ADMIN — Medication 360 MILLIGRAM(S): at 05:39

## 2021-07-02 RX ADMIN — PIPERACILLIN AND TAZOBACTAM 25 GRAM(S): 4; .5 INJECTION, POWDER, LYOPHILIZED, FOR SOLUTION INTRAVENOUS at 21:08

## 2021-07-02 RX ADMIN — Medication 1 MILLIGRAM(S): at 11:41

## 2021-07-02 RX ADMIN — Medication 1 MILLIGRAM(S): at 04:03

## 2021-07-02 RX ADMIN — Medication 100 MILLIGRAM(S): at 05:39

## 2021-07-02 RX ADMIN — Medication 1 TABLET(S): at 11:41

## 2021-07-02 RX ADMIN — ENOXAPARIN SODIUM 40 MILLIGRAM(S): 100 INJECTION SUBCUTANEOUS at 11:41

## 2021-07-02 NOTE — PROGRESS NOTE ADULT - PROBLEM SELECTOR PLAN 3
Finished three days of rocephin  Culture data noted -- klebsiella sensitive to rocephin  Now with recurrent fever -- on empiric Zosyn  No clear source  Cultures NTD  ID consult noted Endocrine consult

## 2021-07-02 NOTE — PROGRESS NOTE ADULT - PROBLEM SELECTOR PLAN 1
Off iv cardizem drip  Continue Metoprolol and po cardizem  On digoxin now  ECHO noted  Xarelto on hold temporarily pending mental clinical course  TSH normal on admission -- now slightly up secondary to acute illness  Cardio consult noted  Further work-up/management pending clinical course. Off iv cardizem drip  Continue Metoprolol and po cardizem  On digoxin now  ECHO noted  Xarelto on hold temporarily pending mental clinical course -- on full dose lovenox  TSH normal on admission -- now slightly up secondary to acute illness  Cardio consult noted  Further work-up/management pending clinical course.

## 2021-07-02 NOTE — PROGRESS NOTE ADULT - SUBJECTIVE AND OBJECTIVE BOX
Patient is a 71y old  Female who presents with a chief complaint of Dizzy (02 Jul 2021 06:02)      INTERVAL HPI/OVERNIGHT EVENTS: Patient seen and examined. NAD. More awake. Eating.    Vital Signs Last 24 Hrs  T(C): 37.6 (02 Jul 2021 09:24), Max: 37.6 (02 Jul 2021 09:24)  T(F): 99.7 (02 Jul 2021 09:24), Max: 99.7 (02 Jul 2021 09:24)  HR: 75 (02 Jul 2021 09:24) (75 - 108)  BP: 134/75 (02 Jul 2021 09:24) (114/77 - 143/86)  BP(mean): --  RR: 18 (02 Jul 2021 09:24) (18 - 18)  SpO2: 95% (02 Jul 2021 09:24) (93% - 96%)    07-02    142  |  108  |  17  ----------------------------<  95  3.6   |  24  |  0.61    Ca    9.3      02 Jul 2021 07:40  Mg     2.0     07-02                            11.9   10.99 )-----------( 276      ( 02 Jul 2021 07:40 )             36.5       CAPILLARY BLOOD GLUCOSE                  acetaminophen   Tablet .. 650 milliGRAM(s) Oral every 6 hours PRN  ammonium lactate 12% Lotion 1 Application(s) Topical two times a day  digoxin     Tablet 250 MICROGram(s) Oral daily  diltiazem    milliGRAM(s) Oral daily  enoxaparin Injectable 40 milliGRAM(s) SubCutaneous daily  folic acid 1 milliGRAM(s) Oral daily  LORazepam   Injectable 1 milliGRAM(s) IV Push every 2 hours PRN  LORazepam   Injectable 1 milliGRAM(s) IV Push every 1 hour PRN  metoprolol tartrate 100 milliGRAM(s) Oral two times a day  multivitamin 1 Tablet(s) Oral daily  OLANZapine Injectable 2.5 milliGRAM(s) IntraMuscular every 6 hours PRN  piperacillin/tazobactam IVPB.. 3.375 Gram(s) IV Intermittent every 8 hours              REVIEW OF SYSTEMS:  CONSTITUTIONAL: No fever, no weight loss, or no fatigue  NECK: No pain, no stiffness  RESPIRATORY: No cough, no wheezing, no chills, no hemoptysis, No shortness of breath  CARDIOVASCULAR: No chest pain, no palpitations, no dizziness, no leg swelling  GASTROINTESTINAL: No abdominal pain. No nausea, no vomiting, no hematemesis; No diarrhea, no constipation. No melena, no hematochezia.  GENITOURINARY: No dysuria, no frequency, no hematuria, no incontinence  NEUROLOGICAL: No headaches, no loss of strength, no numbness, no tremors  SKIN: No itching, no burning  MUSCULOSKELETAL: No joint pain, no swelling; No muscle, no back, no extremity pain  PSYCHIATRIC: No depression, no mood swings,   HEME/LYMPH: No easy bruising, no bleeding gums  ALLERY AND IMMUNOLOGIC: No hives       Consultant(s) Notes Reviewed:  [X] YES  [ ] NO    PHYSICAL EXAM:  GENERAL: NAD  HEAD:  Atraumatic, Normocephalic  EYES: EOMI, PERRLA, conjunctiva and sclera clear  ENMT: No tonsillar erythema, exudates, or enlargement; Moist mucous membranes  NECK: Supple, No JVD  NERVOUS SYSTEM:  more Awake & alert  CHEST/LUNG: Clear to auscultation bilaterally; No rales, rhonchi, wheezing,  HEART: Regular rate and rhythm  ABDOMEN: Soft, Nontender, Nondistended; Bowel sounds present  EXTREMITIES:  No clubbing, cyanosis, or edema  LYMPH: No lymphadenopathy noted  SKIN: No rashes      Advanced care planning discussed with patient/family [X] YES   [ ] NO    Advanced care planning discussed with patient/family. Patient's health status was discussed. All appropriate changes have been made regarding patient's end-of-life care. Advanced care planning forms reviewed/discussed/completed.  20 minutes spent.

## 2021-07-02 NOTE — PROGRESS NOTE ADULT - SUBJECTIVE AND OBJECTIVE BOX
Date/Time Patient Seen:  		  Referring MD:   Data Reviewed	       Patient is a 71y old  Female who presents with a chief complaint of Dizzy (01 Jul 2021 17:17)      Subjective/HPI     PAST MEDICAL & SURGICAL HISTORY:  HTN (hypertension), benign    CHF (congestive heart failure)    Pulmonary embolism    HLD (hyperlipidemia)    HTN (hypertension)    Hemochromatosis    S/P D&amp;C (status post dilation and curettage)  with IUD insertion in 2013    No significant past surgical history          Medication list         MEDICATIONS  (STANDING):  ammonium lactate 12% Lotion 1 Application(s) Topical two times a day  digoxin     Tablet 250 MICROGram(s) Oral daily  diltiazem    milliGRAM(s) Oral daily  enoxaparin Injectable 40 milliGRAM(s) SubCutaneous daily  folic acid 1 milliGRAM(s) Oral daily  metoprolol tartrate 100 milliGRAM(s) Oral two times a day  multivitamin 1 Tablet(s) Oral daily  piperacillin/tazobactam IVPB.. 3.375 Gram(s) IV Intermittent every 8 hours    MEDICATIONS  (PRN):  acetaminophen   Tablet .. 650 milliGRAM(s) Oral every 6 hours PRN Temp greater or equal to 38C (100.4F)  LORazepam   Injectable 1 milliGRAM(s) IV Push every 2 hours PRN CIWA-Ar score increase by 2 points and a total score of 7 or less  LORazepam   Injectable 1 milliGRAM(s) IV Push every 1 hour PRN CIWA-Ar score 8 or greater  OLANZapine Injectable 2.5 milliGRAM(s) IntraMuscular every 6 hours PRN agitation         Vitals log        ICU Vital Signs Last 24 Hrs  T(C): 37.1 (02 Jul 2021 05:38), Max: 37.8 (01 Jul 2021 06:48)  T(F): 98.7 (02 Jul 2021 05:38), Max: 100 (01 Jul 2021 06:48)  HR: 100 (02 Jul 2021 05:38) (84 - 108)  BP: 127/80 (02 Jul 2021 05:38) (114/77 - 143/86)  BP(mean): --  ABP: --  ABP(mean): --  RR: 18 (02 Jul 2021 05:38) (18 - 18)  SpO2: 93% (02 Jul 2021 05:38) (93% - 96%)           Input and Output:  I&O's Detail    30 Jun 2021 07:01  -  01 Jul 2021 07:00  --------------------------------------------------------  IN:    IV PiggyBack: 200 mL    Oral Fluid: 240 mL  Total IN: 440 mL    OUT:    Voided (mL): 925 mL  Total OUT: 925 mL    Total NET: -485 mL      01 Jul 2021 07:01  -  02 Jul 2021 06:03  --------------------------------------------------------  IN:    IV PiggyBack: 100 mL    Oral Fluid: 240 mL  Total IN: 340 mL    OUT:    Voided (mL): 800 mL  Total OUT: 800 mL    Total NET: -460 mL          Lab Data                        11.7   12.68 )-----------( 255      ( 01 Jul 2021 07:01 )             35.6     07-01    143  |  112<H>  |  15  ----------------------------<  140<H>  3.7   |  23  |  0.59    Ca    9.0      01 Jul 2021 07:01  Mg     1.9     07-01              Review of Systems	      Objective     Physical Examination    heart s1s2  lung dec BS  abd soft      Pertinent Lab findings & Imaging      Ino:  NO   Adequate UO     I&O's Detail    30 Jun 2021 07:01  -  01 Jul 2021 07:00  --------------------------------------------------------  IN:    IV PiggyBack: 200 mL    Oral Fluid: 240 mL  Total IN: 440 mL    OUT:    Voided (mL): 925 mL  Total OUT: 925 mL    Total NET: -485 mL      01 Jul 2021 07:01  -  02 Jul 2021 06:03  --------------------------------------------------------  IN:    IV PiggyBack: 100 mL    Oral Fluid: 240 mL  Total IN: 340 mL    OUT:    Voided (mL): 800 mL  Total OUT: 800 mL    Total NET: -460 mL               Discussed with:     Cultures:	        Radiology

## 2021-07-02 NOTE — PROGRESS NOTE ADULT - PROBLEM SELECTOR PLAN 4
Continue lopressor and diltiazem Finished three days of rocephin  Culture data noted -- klebsiella sensitive to rocephin  Now with recurrent fever -- on empiric Zosyn  No clear source  Cultures NTD  ID consult noted

## 2021-07-02 NOTE — PROGRESS NOTE ADULT - SUBJECTIVE AND OBJECTIVE BOX
Chief Complaint: Tachycardia    Interval Events: No events overnight.    Review of Systems:  General: No fevers, chills, weight loss or gain  Skin: No rashes, color changes  Cardiovascular: No chest pain, orthopnea  Respiratory: No shortness of breath, cough  Gastrointestinal: No nausea, abdominal pain  Genitourinary: No incontinence, pain with urination  Musculoskeletal: No pain, swelling, decreased range of motion  Neurological: No headache, weakness  Psychiatric: No depression, anxiety  Endocrine: No weight loss or gain, increased thirst  All other systems are comprehensively negative.    Physical Exam:  Vital Signs Last 24 Hrs  T(C): 37.6 (02 Jul 2021 09:24), Max: 37.6 (02 Jul 2021 09:24)  T(F): 99.7 (02 Jul 2021 09:24), Max: 99.7 (02 Jul 2021 09:24)  HR: 75 (02 Jul 2021 09:24) (75 - 108)  BP: 134/75 (02 Jul 2021 09:24) (114/77 - 143/86)  BP(mean): --  RR: 18 (02 Jul 2021 09:24) (18 - 18)  SpO2: 95% (02 Jul 2021 09:24) (93% - 96%)  General: Confused  HEENT: MMM  Neck: No JVD, no carotid bruit  Lungs: CTAB  CV: Irregular, nl S1/S2, no M/R/G  Abdomen: S/NT/ND, +BS  Extremities: No LE edema, no cyanosis  Neuro: AAOx1, non-focal  Skin: No rash    Labs:    07-02    142  |  108  |  17  ----------------------------<  95  3.6   |  24  |  0.61    Ca    9.3      02 Jul 2021 07:40  Mg     2.0     07-02                          11.9   10.99 )-----------( 276      ( 02 Jul 2021 07:40 )             36.5       Telemetry: AF

## 2021-07-02 NOTE — PROGRESS NOTE ADULT - SUBJECTIVE AND OBJECTIVE BOX
JOSAFAT CLEMENTE is a 71yFemale , patient examined and chart reviewed.     INTERVAL HPI/ OVERNIGHT EVENTS:   Awake alert today.  Sp fever.    PAST MEDICAL & SURGICAL HISTORY:  HTN (hypertension), benign  CHF (congestive heart failure)  Pulmonary embolism  HLD (hyperlipidemia)  HTN (hypertension)  Hemochromatosis  S/P D&amp;C (status post dilation and curettage)  with IUD insertion in 2013  No significant past surgical history        For details regarding the patient's social history, family history, and other miscellaneous elements, please refer the initial infectious diseases consultation and/or the admitting history and physical examination for this admission.      ROS:  CONSTITUTIONAL: no fever or chills  EYES:  Negative  blurry vision or double vision  CARDIOVASCULAR:  Negative for chest pain or palpitations  RESPIRATORY:  Negative for cough, wheezing, or SOB   GASTROINTESTINAL:  Negative for nausea, vomiting, diarrhea, constipation, or abdominal pain  GENITOURINARY:  Negative frequency, urgency or dysuria  NEUROLOGIC:  No headache, confusion, dizziness, lightheadedness  All other systems were reviewed and are negative         Current inpatient medications :    ANTIBIOTICS/RELEVANT:  piperacillin/tazobactam IVPB.. 3.375 Gram(s) IV Intermittent every 8 hours    MEDICATIONS  (STANDING):  ammonium lactate 12% Lotion 1 Application(s) Topical two times a day  digoxin     Tablet 250 MICROGram(s) Oral daily  diltiazem    milliGRAM(s) Oral daily  enoxaparin Injectable 100 milliGRAM(s) SubCutaneous every 12 hours  folic acid 1 milliGRAM(s) Oral daily  metoprolol tartrate 100 milliGRAM(s) Oral two times a day  multivitamin 1 Tablet(s) Oral daily    MEDICATIONS  (PRN):  acetaminophen   Tablet .. 650 milliGRAM(s) Oral every 6 hours PRN Temp greater or equal to 38C (100.4F)  LORazepam   Injectable 1 milliGRAM(s) IV Push every 2 hours PRN CIWA-Ar score increase by 2 points and a total score of 7 or less  LORazepam   Injectable 1 milliGRAM(s) IV Push every 1 hour PRN CIWA-Ar score 8 or greater  OLANZapine Injectable 2.5 milliGRAM(s) IntraMuscular every 6 hours PRN agitation      Objective:  Vital Signs Last 24 Hrs  T(C): 37.6 (02 Jul 2021 09:24), Max: 37.6 (02 Jul 2021 09:24)  T(F): 99.7 (02 Jul 2021 09:24), Max: 99.7 (02 Jul 2021 09:24)  HR: 75 (02 Jul 2021 09:24) (75 - 108)  BP: 134/75 (02 Jul 2021 09:24) (122/76 - 143/86)  RR: 18 (02 Jul 2021 09:24) (18 - 18)  SpO2: 95% (02 Jul 2021 09:24) (93% - 95%)    Physical Exam:  General: weak no acute distress Obese  Neck: supple, trachea midline  Lungs: Decreased no wheeze/rhonchi  Cardiovascular: regular rate and rhythm, S1 S2  Abdomen: soft, nontender,  bowel sounds normal  Neurological: alert awake  Extremities: Lymphedema      LABS:                        11.9   10.99 )-----------( 276      ( 02 Jul 2021 07:40 )             36.5   07-02    142  |  108  |  17  ----------------------------<  95  3.6   |  24  |  0.61    Ca    9.3      02 Jul 2021 07:40  Mg     2.0     07-02      MICROBIOLOGY:    Culture - Urine (collected 29 Jun 2021 15:06)  Source: .Urine Clean Catch (Midstream)  Final Report (30 Jun 2021 12:56):    No growth    Culture - Blood (collected 29 Jun 2021 12:13)  Source: .Blood Blood  Preliminary Report (30 Jun 2021 13:01):    No growth to date.    Culture - Blood (collected 29 Jun 2021 12:13)  Source: .Blood Blood  Preliminary Report (30 Jun 2021 13:01):    No growth to date.    RADIOLOGY & ADDITIONAL STUDIES:    EXAM:  CT CHEST                            PROCEDURE DATE:  06/30/2021          INTERPRETATION:  CLINICAL INFORMATION: Fever. Evaluate for pneumonia    COMPARISON: CT chest 6/24/2021    CONTRAST/COMPLICATIONS:  IV Contrast: NONE  0 cc administered 0 cc discarded  Oral Contrast: NONE  Complications: None reported at time of study completion    PROCEDURE:  CT of the Chest was performed.  Sagittal and coronal reformats were performed.    FINDINGS:    LUNGS AND AIRWAYS: Patent central airways.  Minimal dependent atelectatic changes otherwise clear lungs. No evidence for pneumonia.  PLEURA: No pleural effusion.  MEDIASTINUM AND MASOUD: No lymphadenopathy.  VESSELS: Within normal limits.  HEART: Heart size is normal. No pericardial effusion.  CHEST WALL AND LOWER NECK: Within normal limits.  VISUALIZED UPPER ABDOMEN: Within normal limits.  BONES: Degenerative changes.    IMPRESSION:    Minimal dependent atelectatic changes otherwise clear lungs. No evidence for pneumonia.    Assessment :  68 year old female with a history of HTN, HL, DVT, lymphedema, hemochromatosis admitted on 6/24/21 with rapid atrial fibrillation and UTI completed 3 days course of Rocephin. Course in hospital complicated by ETOH withdrawal.  Lethargic but arousable. On CIWA protocol.   Sp fever  Cultures NGTD  CT chest neg for pneumonia + atelectasis      Plan :   Dc Zosyn and monitor off antibiotics  Trend temps and cbc  Asp precautions  CIWA protocol per primary team  Pulm toileting  Increase activity    I will be away 7/3/2021 to 7/14/2021. Dr Jules Baum et al will be covering.      Continue with present regiment.  Appropriate use of antibiotics and adverse effects reviewed.      > 35 minutes were spent in direct patient care reviewing notes, medications ,labs data/ imaging , discussion with multidisciplinary team.    Thank you for allowing me to participate in care of your patient .    Jazmyne Gamez MD  Infectious Disease  [phone]      JOSAFAT CLEMENTE is a 71yFemale , patient examined and chart reviewed.     INTERVAL HPI/ OVERNIGHT EVENTS:   Awake alert today.  Sp fever.    PAST MEDICAL & SURGICAL HISTORY:  HTN (hypertension), benign  CHF (congestive heart failure)  Pulmonary embolism  HLD (hyperlipidemia)  HTN (hypertension)  Hemochromatosis  S/P D&amp;C (status post dilation and curettage)  with IUD insertion in 2013  No significant past surgical history        For details regarding the patient's social history, family history, and other miscellaneous elements, please refer the initial infectious diseases consultation and/or the admitting history and physical examination for this admission.      ROS:  CONSTITUTIONAL: no fever or chills  EYES:  Negative  blurry vision or double vision  CARDIOVASCULAR:  Negative for chest pain or palpitations  RESPIRATORY:  Negative for cough, wheezing, or SOB   GASTROINTESTINAL:  Negative for nausea, vomiting, diarrhea, constipation, or abdominal pain  GENITOURINARY:  Negative frequency, urgency or dysuria  NEUROLOGIC:  No headache, confusion, dizziness, lightheadedness  All other systems were reviewed and are negative         Current inpatient medications :    ANTIBIOTICS/RELEVANT:  piperacillin/tazobactam IVPB.. 3.375 Gram(s) IV Intermittent every 8 hours    MEDICATIONS  (STANDING):  ammonium lactate 12% Lotion 1 Application(s) Topical two times a day  digoxin     Tablet 250 MICROGram(s) Oral daily  diltiazem    milliGRAM(s) Oral daily  enoxaparin Injectable 100 milliGRAM(s) SubCutaneous every 12 hours  folic acid 1 milliGRAM(s) Oral daily  metoprolol tartrate 100 milliGRAM(s) Oral two times a day  multivitamin 1 Tablet(s) Oral daily    MEDICATIONS  (PRN):  acetaminophen   Tablet .. 650 milliGRAM(s) Oral every 6 hours PRN Temp greater or equal to 38C (100.4F)  LORazepam   Injectable 1 milliGRAM(s) IV Push every 2 hours PRN CIWA-Ar score increase by 2 points and a total score of 7 or less  LORazepam   Injectable 1 milliGRAM(s) IV Push every 1 hour PRN CIWA-Ar score 8 or greater  OLANZapine Injectable 2.5 milliGRAM(s) IntraMuscular every 6 hours PRN agitation      Objective:  Vital Signs Last 24 Hrs  T(C): 37.6 (02 Jul 2021 09:24), Max: 37.6 (02 Jul 2021 09:24)  T(F): 99.7 (02 Jul 2021 09:24), Max: 99.7 (02 Jul 2021 09:24)  HR: 75 (02 Jul 2021 09:24) (75 - 108)  BP: 134/75 (02 Jul 2021 09:24) (122/76 - 143/86)  RR: 18 (02 Jul 2021 09:24) (18 - 18)  SpO2: 95% (02 Jul 2021 09:24) (93% - 95%)    Physical Exam:  General: weak no acute distress Obese  Neck: supple, trachea midline  Lungs: Decreased no wheeze/rhonchi  Cardiovascular: regular rate and rhythm, S1 S2  Abdomen: soft, nontender,  bowel sounds normal  Neurological: alert awake  Extremities: Lymphedema      LABS:                        11.9   10.99 )-----------( 276      ( 02 Jul 2021 07:40 )             36.5   07-02    142  |  108  |  17  ----------------------------<  95  3.6   |  24  |  0.61    Ca    9.3      02 Jul 2021 07:40  Mg     2.0     07-02      MICROBIOLOGY:    Culture - Urine (collected 29 Jun 2021 15:06)  Source: .Urine Clean Catch (Midstream)  Final Report (30 Jun 2021 12:56):    No growth    Culture - Blood (collected 29 Jun 2021 12:13)  Source: .Blood Blood  Preliminary Report (30 Jun 2021 13:01):    No growth to date.    Culture - Blood (collected 29 Jun 2021 12:13)  Source: .Blood Blood  Preliminary Report (30 Jun 2021 13:01):    No growth to date.    RADIOLOGY & ADDITIONAL STUDIES:    EXAM:  CT CHEST                            PROCEDURE DATE:  06/30/2021          INTERPRETATION:  CLINICAL INFORMATION: Fever. Evaluate for pneumonia    COMPARISON: CT chest 6/24/2021    CONTRAST/COMPLICATIONS:  IV Contrast: NONE  0 cc administered 0 cc discarded  Oral Contrast: NONE  Complications: None reported at time of study completion    PROCEDURE:  CT of the Chest was performed.  Sagittal and coronal reformats were performed.    FINDINGS:    LUNGS AND AIRWAYS: Patent central airways.  Minimal dependent atelectatic changes otherwise clear lungs. No evidence for pneumonia.  PLEURA: No pleural effusion.  MEDIASTINUM AND MASOUD: No lymphadenopathy.  VESSELS: Within normal limits.  HEART: Heart size is normal. No pericardial effusion.  CHEST WALL AND LOWER NECK: Within normal limits.  VISUALIZED UPPER ABDOMEN: Within normal limits.  BONES: Degenerative changes.    IMPRESSION:    Minimal dependent atelectatic changes otherwise clear lungs. No evidence for pneumonia.    Assessment :  68 year old female with a history of HTN, HL, DVT, lymphedema, hemochromatosis admitted on 6/24/21 with rapid atrial fibrillation and UTI completed 3 days course of Rocephin. Course in hospital complicated by ETOH withdrawal.  Mental status improving  Sp fever  Cultures NGTD  CT chest neg for pneumonia + atelectasis    Plan :   Dc Zosyn and monitor off antibiotics  Trend temps and cbc  Asp precautions  CIWA protocol per primary team  Pulm toileting  Increase activity    I will be away 7/3/2021 to 7/14/2021. Dr Jules Chowdhury will be covering.      Continue with present regiment.  Appropriate use of antibiotics and adverse effects reviewed.      > 35 minutes were spent in direct patient care reviewing notes, medications ,labs data/ imaging , discussion with multidisciplinary team.    Thank you for allowing me to participate in care of your patient .    Jazmyne Gamez MD  Infectious Disease  [phone]

## 2021-07-02 NOTE — PROGRESS NOTE ADULT - PROBLEM SELECTOR PLAN 2
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

## 2021-07-02 NOTE — PROGRESS NOTE ADULT - SUBJECTIVE AND OBJECTIVE BOX
Neurology follow up note    JOSAFAT [id number]      Interval History:    Patient feels ok no new complaints.    MEDICATIONS    acetaminophen   Tablet .. 650 milliGRAM(s) Oral every 6 hours PRN  ammonium lactate 12% Lotion 1 Application(s) Topical two times a day  digoxin     Tablet 250 MICROGram(s) Oral daily  diltiazem    milliGRAM(s) Oral daily  enoxaparin Injectable 100 milliGRAM(s) SubCutaneous every 12 hours  folic acid 1 milliGRAM(s) Oral daily  LORazepam   Injectable 1 milliGRAM(s) IV Push every 2 hours PRN  LORazepam   Injectable 1 milliGRAM(s) IV Push every 1 hour PRN  metoprolol tartrate 100 milliGRAM(s) Oral two times a day  multivitamin 1 Tablet(s) Oral daily  OLANZapine Injectable 2.5 milliGRAM(s) IntraMuscular every 6 hours PRN  piperacillin/tazobactam IVPB.. 3.375 Gram(s) IV Intermittent every 8 hours      Allergies    No Known Allergies    Intolerances            Vital Signs Last 24 Hrs  T(C): 37.6 (02 Jul 2021 09:24), Max: 37.6 (02 Jul 2021 09:24)  T(F): 99.7 (02 Jul 2021 09:24), Max: 99.7 (02 Jul 2021 09:24)  HR: 75 (02 Jul 2021 09:24) (75 - 108)  BP: 134/75 (02 Jul 2021 09:24) (114/77 - 143/86)  BP(mean): --  RR: 18 (02 Jul 2021 09:24) (18 - 18)  SpO2: 95% (02 Jul 2021 09:24) (93% - 96%)        REVIEW OF SYSTEMS:  limited feels ok     PHYSICAL EXAMINATION:  HEENT:  Head:  Positive trauma was noted over the left eye.  No scleral icterus was noted.  Ears:  Hearing bilaterally intact.  NECK:  Supple.  RESPIRATORY:  Good air entry bilaterally.  CARDIOVASCULAR:  S1 and S2 heard.  ABDOMEN:  Soft and nontender.  EXTREMITIES:  No clubbing was noted.      NEUROLOGIC:  The patient awake in bed   [facility]  2021 june    follow commands  Extraocular movements were intact, full visual field.    Speech fluent   Smile was symmetric.    Motor:  Bilateral upper were 4/5, bilateral lower were 3-/5.                      LABS:  CBC Full  -  ( 02 Jul 2021 07:40 )  WBC Count : 10.99 K/uL  RBC Count : 3.58 M/uL  Hemoglobin : 11.9 g/dL  Hematocrit : 36.5 %  Platelet Count - Automated : 276 K/uL  Mean Cell Volume : 102.0 fl  Mean Cell Hemoglobin : 33.2 pg  Mean Cell Hemoglobin Concentration : 32.6 gm/dL  Auto Neutrophil # : x  Auto Lymphocyte # : x  Auto Monocyte # : x  Auto Eosinophil # : x  Auto Basophil # : x  Auto Neutrophil % : x  Auto Lymphocyte % : x  Auto Monocyte % : x  Auto Eosinophil % : x  Auto Basophil % : x      07-02    142  |  108  |  17  ----------------------------<  95  3.6   |  24  |  0.61    Ca    9.3      02 Jul 2021 07:40  Mg     2.0     07-02      Hemoglobin A1C:       Vitamin B12         RADIOLOGY      ANALYSIS AND PLAN:  This is a 71-year-old with an episode of altered mental status.  Suspect patients AMS is possible from the history of hemochromatosis with oxidative stress and iron leading to a chronic neurodegenerative deterioration in overall cognitive status which unfortunately will be progressive. Questionable, the patient has an worsening mild cognitive impairment that is progressively becoming worse with hospital setting leading to the hospital delirium, possible metabolic encephalopathy.  antibiotics as needed   For history of atrial fibrillation, anticoagulation as needed.  For history of hypertension, monitor systolic blood pressure.  For history of hyperlipidemia, condition the patient on statin.  Fall precautions.  monitor oxygen and CO2 levels as needed  recommend Psychiatry follow-up in regards to control of symptoms.   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 she understands that she will deteriorate cognitive wise as time goes on will most likely need placement       Greater than 38 minutes of time was spent with the patient, plan of care, reviewing data, with greater than 50% of the visit was spent counseling and/or coordinating care with multidisciplinary healthcare team

## 2021-07-02 NOTE — BH CONSULTATION LIAISON PROGRESS NOTE - NSBHCHARTREVIEWVS_PSY_A_CORE FT
Vital Signs Last 24 Hrs  T(C): 37.6 (02 Jul 2021 09:24), Max: 37.6 (02 Jul 2021 09:24)  T(F): 99.7 (02 Jul 2021 09:24), Max: 99.7 (02 Jul 2021 09:24)  HR: 75 (02 Jul 2021 09:24) (75 - 108)  BP: 134/75 (02 Jul 2021 09:24) (114/77 - 143/86)  BP(mean): --  RR: 18 (02 Jul 2021 09:24) (18 - 18)  SpO2: 95% (02 Jul 2021 09:24) (93% - 96%)

## 2021-07-03 DIAGNOSIS — N25.81 SECONDARY HYPERPARATHYROIDISM OF RENAL ORIGIN: ICD-10-CM

## 2021-07-03 LAB
ANION GAP SERPL CALC-SCNC: 7 MMOL/L — SIGNIFICANT CHANGE UP (ref 5–17)
BUN SERPL-MCNC: 10 MG/DL — SIGNIFICANT CHANGE UP (ref 7–23)
CALCIUM SERPL-MCNC: 9.3 MG/DL — SIGNIFICANT CHANGE UP (ref 8.5–10.1)
CHLORIDE SERPL-SCNC: 108 MMOL/L — SIGNIFICANT CHANGE UP (ref 96–108)
CO2 SERPL-SCNC: 26 MMOL/L — SIGNIFICANT CHANGE UP (ref 22–31)
CREAT SERPL-MCNC: 0.45 MG/DL — LOW (ref 0.5–1.3)
GLUCOSE SERPL-MCNC: 109 MG/DL — HIGH (ref 70–99)
HCT VFR BLD CALC: 35.2 % — SIGNIFICANT CHANGE UP (ref 34.5–45)
HGB BLD-MCNC: 11.5 G/DL — SIGNIFICANT CHANGE UP (ref 11.5–15.5)
MAGNESIUM SERPL-MCNC: 1.9 MG/DL — SIGNIFICANT CHANGE UP (ref 1.6–2.6)
MCHC RBC-ENTMCNC: 32.7 GM/DL — SIGNIFICANT CHANGE UP (ref 32–36)
MCHC RBC-ENTMCNC: 33.1 PG — SIGNIFICANT CHANGE UP (ref 27–34)
MCV RBC AUTO: 101.4 FL — HIGH (ref 80–100)
NRBC # BLD: 0 /100 WBCS — SIGNIFICANT CHANGE UP (ref 0–0)
PLATELET # BLD AUTO: 297 K/UL — SIGNIFICANT CHANGE UP (ref 150–400)
POTASSIUM SERPL-MCNC: 3.5 MMOL/L — SIGNIFICANT CHANGE UP (ref 3.5–5.3)
POTASSIUM SERPL-SCNC: 3.5 MMOL/L — SIGNIFICANT CHANGE UP (ref 3.5–5.3)
RBC # BLD: 3.47 M/UL — LOW (ref 3.8–5.2)
RBC # FLD: 15.2 % — HIGH (ref 10.3–14.5)
SODIUM SERPL-SCNC: 141 MMOL/L — SIGNIFICANT CHANGE UP (ref 135–145)
WBC # BLD: 9.36 K/UL — SIGNIFICANT CHANGE UP (ref 3.8–10.5)
WBC # FLD AUTO: 9.36 K/UL — SIGNIFICANT CHANGE UP (ref 3.8–10.5)

## 2021-07-03 RX ORDER — ACETAMINOPHEN 500 MG
650 TABLET ORAL EVERY 6 HOURS
Refills: 0 | Status: DISCONTINUED | OUTPATIENT
Start: 2021-07-03 | End: 2021-07-08

## 2021-07-03 RX ORDER — RIVAROXABAN 15 MG-20MG
20 KIT ORAL
Refills: 0 | Status: DISCONTINUED | OUTPATIENT
Start: 2021-07-03 | End: 2021-07-08

## 2021-07-03 RX ADMIN — Medication 100 MILLIGRAM(S): at 17:22

## 2021-07-03 RX ADMIN — Medication 1 MILLIGRAM(S): at 11:51

## 2021-07-03 RX ADMIN — Medication 1 APPLICATION(S): at 05:34

## 2021-07-03 RX ADMIN — Medication 650 MILLIGRAM(S): at 11:53

## 2021-07-03 RX ADMIN — Medication 360 MILLIGRAM(S): at 05:34

## 2021-07-03 RX ADMIN — ENOXAPARIN SODIUM 100 MILLIGRAM(S): 100 INJECTION SUBCUTANEOUS at 05:34

## 2021-07-03 RX ADMIN — Medication 650 MILLIGRAM(S): at 12:50

## 2021-07-03 RX ADMIN — Medication 100 MILLIGRAM(S): at 05:34

## 2021-07-03 RX ADMIN — NYSTATIN CREAM 1 APPLICATION(S): 100000 CREAM TOPICAL at 05:34

## 2021-07-03 RX ADMIN — Medication 250 MICROGRAM(S): at 05:34

## 2021-07-03 RX ADMIN — Medication 1 TABLET(S): at 11:51

## 2021-07-03 RX ADMIN — Medication 1 APPLICATION(S): at 17:22

## 2021-07-03 RX ADMIN — Medication 5 MILLIGRAM(S): at 21:25

## 2021-07-03 RX ADMIN — NYSTATIN CREAM 1 APPLICATION(S): 100000 CREAM TOPICAL at 17:23

## 2021-07-03 RX ADMIN — PIPERACILLIN AND TAZOBACTAM 25 GRAM(S): 4; .5 INJECTION, POWDER, LYOPHILIZED, FOR SOLUTION INTRAVENOUS at 05:34

## 2021-07-03 RX ADMIN — RIVAROXABAN 20 MILLIGRAM(S): KIT at 17:22

## 2021-07-03 NOTE — PROGRESS NOTE ADULT - PROBLEM SELECTOR PLAN 2
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

## 2021-07-03 NOTE — PROGRESS NOTE ADULT - SUBJECTIVE AND OBJECTIVE BOX
Patient is a 71y Female with a known history of :  Rapid atrial fibrillation [I48.91]    Pulmonary embolism [I26.99]    Dyslipidemia [E78.5]    Essential hypertension [I10]    Hypokalemia [E87.6]    Altered mental status [R41.82]    Acute UTI [N39.0]    Hemochromatosis [E83.119]    Elevated PTHrP level [R79.89]    Secondary hyperparathyroidism [N25.81]      HPI:  The patient is a 68 year old female with a history of HTN, HL, DVT, lymphedema, hemochromatosis who was sent in from [facility] with rapid atrial fibrillation. She states over the last couple of months she has felt lightheaded and nauseous at times. No palpitations, chest pain, shortness of breath. She is able to complete exercises at home. She has not seen her cardiologist Dr. Gonzalez in over a year. No recent cardiac testing.   (25 Jun 2021 10:31)      REVIEW OF SYSTEMS:    CONSTITUTIONAL: No fever, weight loss, or fatigue  EYES: No eye pain, visual disturbances, or discharge  ENMT:  No difficulty hearing, tinnitus, vertigo; No sinus or throat pain  NECK: No pain or stiffness  BREASTS: No pain, masses, or nipple discharge  RESPIRATORY: No cough, wheezing, chills or hemoptysis; No shortness of breath  CARDIOVASCULAR: No chest pain, palpitations, dizziness, or leg swelling  GASTROINTESTINAL: No abdominal or epigastric pain. No nausea, vomiting, or hematemesis; No diarrhea or constipation. No melena or hematochezia.  GENITOURINARY: No dysuria, frequency, hematuria, or incontinence  NEUROLOGICAL: No headaches, memory loss, loss of strength, numbness, or tremors  SKIN: No itching, burning, rashes, or lesions   LYMPH NODES: No enlarged glands  ENDOCRINE: No heat or cold intolerance; No hair loss  MUSCULOSKELETAL: No joint pain or swelling; No muscle, back, or extremity pain  PSYCHIATRIC: No depression, anxiety, mood swings, or difficulty sleeping  HEME/LYMPH: No easy bruising, or bleeding gums  ALLERGY AND IMMUNOLOGIC: No hives or eczema    MEDICATIONS  (STANDING):  ammonium lactate 12% Lotion 1 Application(s) Topical two times a day  digoxin     Tablet 250 MICROGram(s) Oral daily  diltiazem    milliGRAM(s) Oral daily  folic acid 1 milliGRAM(s) Oral daily  melatonin 5 milliGRAM(s) Oral at bedtime  metoprolol tartrate 100 milliGRAM(s) Oral two times a day  multivitamin 1 Tablet(s) Oral daily  nystatin Powder 1 Application(s) Topical every 12 hours  rivaroxaban 20 milliGRAM(s) Oral with dinner    MEDICATIONS  (PRN):  acetaminophen   Tablet .. 650 milliGRAM(s) Oral every 6 hours PRN Temp greater or equal to 38C (100.4F)  acetaminophen   Tablet .. 650 milliGRAM(s) Oral every 6 hours PRN Mild Pain (1 - 3)  LORazepam   Injectable 1 milliGRAM(s) IV Push every 2 hours PRN CIWA-Ar score increase by 2 points and a total score of 7 or less  LORazepam   Injectable 1 milliGRAM(s) IV Push every 1 hour PRN CIWA-Ar score 8 or greater  OLANZapine Injectable 2.5 milliGRAM(s) IntraMuscular every 6 hours PRN agitation      ALLERGIES: No Known Allergies      FAMILY HISTORY:  Family history of cerebrovascular accident (CVA) (Mother)    Family history of colon cancer (Father)    Family history of hemochromatosis (Sibling)        Social history:  Alochol:   Smoking:   Drug Use:   Marital Status:     PHYSICAL EXAMINATION:  -----------------------------  T(C): 37.2 (07-03-21 @ 11:37), Max: 37.2 (07-03-21 @ 11:37)  HR: 89 (07-03-21 @ 17:24) (86 - 89)  BP: 103/64 (07-03-21 @ 17:24) (103/64 - 133/81)  RR: 18 (07-03-21 @ 11:37) (18 - 18)  SpO2: 97% (07-03-21 @ 11:37) (97% - 97%)  Wt(kg): --    07-02 @ 07:01  -  07-03 @ 07:00  --------------------------------------------------------  IN:    IV PiggyBack: 200 mL  Total IN: 200 mL    OUT:    Voided (mL): 1000 mL  Total OUT: 1000 mL    Total NET: -800 mL            Constitutional: well developed, normal appearance, well groomed, well nourished, no deformities and no acute distress.   Eyes: the conjunctiva exhibited no abnormalities and the eyelids demonstrated no xanthelasmas.   HEENT: normal oral mucosa, no oral pallor and no oral cyanosis.   Neck: normal jugular venous A waves present, normal jugular venous V waves present and no jugular venous smith A waves.   Pulmonary: no respiratory distress, normal respiratory rhythm and effort, no accessory muscle use and lungs were clear to auscultation bilaterally.   Cardiovascular: heart rate and rhythm were irreg/irreg, normal S1 and S2 and no murmur, gallop, rub, heave or thrill are present.    Musculoskeletal: the gait could not be assessed.  Extremities: no clubbing of the fingernails, no localized cyanosis, no petechial hemorrhages and no ischemic changes. B/L tense with stasis  Skin: normal skin color and pigmentation, no rash, no venous stasis, no skin lesions, no skin ulcer and no xanthoma was observed.   Psychiatric: oriented to person, place, and time, the affect was normal, the mood was normal and not feeling anxious.     LABS:   --------  07-03    141  |  108  |  10  ----------------------------<  109<H>  3.5   |  26  |  0.45<L>    Ca    9.3      03 Jul 2021 08:48  Mg     1.9     07-03                           11.5   9.36  )-----------( 297      ( 03 Jul 2021 08:48 )             35.2                   Radiology:    < from: TTE Echo Complete w/o Contrast w/ Doppler (06.25.21 @ 16:55) >     EXAM:  ECHO TTE WO CON COMP W DOPP         PROCEDURE DATE:  06/25/2021        INTERPRETATION:  Ordering Physician: SANTA POZO [id number]    Indication: Atrial fibrillation    Technician: AS    Study Quality: Technically difficult  A complete echocardiographic study was performed utilizing standard protocol including spectral and color Doppler in all echocardiographic windows.    Height: 175 cm  Weight: 108 kg  BSA: 2.3 m2  Blood Pressure: 107/72 mmHg    MEASUREMENTS  IVS: 1.2 cm  PWT: 1.0 cm  LA: 3.5 cm  AO: 3.0 cm  LVIDd: 4.2 cm  LVIDs: 2.8 cm    LVEF: 55%  RVSP: 34 mmHg  RA Pressure: 8 mmHg    FINDINGS  Left Ventricle: The left ventricle is normal in size. Increased left ventricular wall thickness. The left ventricular systolic functionis not well visualized but appears grossly normal with an EF of 55%.  Right Ventricle: The right ventricle is dilated with normal function.  Left Atrium: The left atrium is dilated.  Right Atrium: The right atrium is dilated.  Mitral Valve: The mitral valve is structurally normal. Trace mitral regurgitation.  Aortic Valve: The aortic valve is grossly normal. No aortic regurgitation.  Tricuspid Valve: The tricuspid valve is grossly normal. Moderate tricuspid regurgitation. Estimated pulmonary artery systolic pressure is 34 mmHg.  Pulmonic Valve: The pulmonic valve is not well visualized.  Diastolic Function: Normal diastolic function.  Pericardium/Pleura: No pericardial effusion visualized.  Aorta: The aortic root is normal in size.    IMPRESSION:  1. Normal left ventricular systolic function.  2. Increased left ventricular wall thickness.  3. Dilated left atrium, right atrium, right ventricle.  4. Moderate tricuspid regurgitation.                SANTA POZO MD; Attending Cardiologist  This document has been electronically signed. Jun 26 2021  7:53AM    < end of copied text >

## 2021-07-03 NOTE — CONSULT NOTE ADULT - CONSULT REASON
elev PTH
Rapid atrial fibrillation
Fever
confusion  obesity  ams  etoh use disorder  ex smoker
hemochromatosis E83.110

## 2021-07-03 NOTE — PROGRESS NOTE ADULT - SUBJECTIVE AND OBJECTIVE BOX
Neurology follow up note    JOSAFAT [id number]      Interval History:    Patient feels ok no new complaints.    MEDICATIONS    acetaminophen   Tablet .. 650 milliGRAM(s) Oral every 6 hours PRN  ammonium lactate 12% Lotion 1 Application(s) Topical two times a day  digoxin     Tablet 250 MICROGram(s) Oral daily  diltiazem    milliGRAM(s) Oral daily  enoxaparin Injectable 100 milliGRAM(s) SubCutaneous every 12 hours  folic acid 1 milliGRAM(s) Oral daily  LORazepam   Injectable 1 milliGRAM(s) IV Push every 2 hours PRN  LORazepam   Injectable 1 milliGRAM(s) IV Push every 1 hour PRN  melatonin 5 milliGRAM(s) Oral at bedtime  metoprolol tartrate 100 milliGRAM(s) Oral two times a day  multivitamin 1 Tablet(s) Oral daily  nystatin Powder 1 Application(s) Topical every 12 hours  OLANZapine Injectable 2.5 milliGRAM(s) IntraMuscular every 6 hours PRN      Allergies    No Known Allergies    Intolerances            Vital Signs Last 24 Hrs  T(C): 36.8 (03 Jul 2021 05:27), Max: 37.3 (02 Jul 2021 18:20)  T(F): 98.2 (03 Jul 2021 05:27), Max: 99.1 (02 Jul 2021 18:20)  HR: 88 (03 Jul 2021 05:27) (88 - 110)  BP: 133/81 (03 Jul 2021 05:27) (124/85 - 133/81)  BP(mean): --  RR: 18 (03 Jul 2021 05:27) (18 - 18)  SpO2: 97% (03 Jul 2021 05:27) (95% - 97%)    REVIEW OF SYSTEMS:  Constitutional:  The patient denies fever, chills, or night sweats.  Head:  At present, no headaches or lightheadedness.  Eyes:  No double vision, but does have blurry vision, suspect she thinks she has macular degeneration.  Ears:  No ringing in ears.  Neck:  No neck pain.  Respiratory:  No shortness of breath.  Cardiovascular:  No chest pain.  Abdomen:  No nausea, vomiting, or abdominal pain.  Extremities/Neurological:  Has numbness and tingling of her feet, but states she has herniated disc in her back.  This is not new.  Musculoskeletal:  Occasional joint pain, possibly from arthritis.    PHYSICAL EXAMINATION:  HEENT:  Head:  Positive trauma was noted over the left eye.  No scleral icterus was noted.  Ears:  Hearing bilaterally intact.  NECK:  Supple.  RESPIRATORY:  Good air entry bilaterally.  CARDIOVASCULAR:  S1 and S2 heard.  ABDOMEN:  Soft and nontender.  EXTREMITIES:  No clubbing was noted.      NEUROLOGIC:  The patient is awake   Location was hospital, year was 2021, month june 5 nickels in a quater   She was able to follow simple  but not complex commands  Extraocular movements were intact, full visual field.    Speech was fluent at times mumbles .  Smile was symmetric.  Motor:  Bilateral upper were 4/5, bilateral lower were 3-/5.           LABS:  CBC Full  -  ( 03 Jul 2021 08:48 )  WBC Count : 9.36 K/uL  RBC Count : 3.47 M/uL  Hemoglobin : 11.5 g/dL  Hematocrit : 35.2 %  Platelet Count - Automated : 297 K/uL  Mean Cell Volume : 101.4 fl  Mean Cell Hemoglobin : 33.1 pg  Mean Cell Hemoglobin Concentration : 32.7 gm/dL  Auto Neutrophil # : x  Auto Lymphocyte # : x  Auto Monocyte # : x  Auto Eosinophil # : x  Auto Basophil # : x  Auto Neutrophil % : x  Auto Lymphocyte % : x  Auto Monocyte % : x  Auto Eosinophil % : x  Auto Basophil % : x      07-03    141  |  108  |  10  ----------------------------<  109<H>  3.5   |  26  |  0.45<L>    Ca    9.3      03 Jul 2021 08:48  Mg     1.9     07-03      Hemoglobin A1C:       Vitamin B12         RADIOLOGY    `ANALYSIS AND PLAN:  This is a 71-year-old with an episode of altered mental status.  Suspect patients AMS is possible from the history of hemochromatosis with oxidative stress and iron leading to a chronic neurodegenerative deterioration in overall cognitive status which unfortunately will be progressive. Questionable, the patient has an worsening mild cognitive impairment that is progressively becoming worse with hospital setting leading to the hospital delirium, possible metabolic encephalopathy.  antibiotics as needed   For history of atrial fibrillation, anticoagulation as needed.  For history of hypertension, monitor systolic blood pressure.  For history of hyperlipidemia, condition the patient on statin.  Fall precautions.  monitor oxygen and CO2 levels as needed  recommend Psychiatry follow-up in regards to control of symptoms.   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 she understands that she will deteriorate cognitive wise as time goes on will most likely need placement       Greater than 38 minutes of time was spent with the patient, plan of care, reviewing data, with greater than 50% of the visit was spent counseling and/or coordinating care with multidisciplinary healthcare team

## 2021-07-03 NOTE — CONSULT NOTE ADULT - SUBJECTIVE AND OBJECTIVE BOX
History of Present Illness: The patient is a 68 year old female with a history of HTN, HL, DVT, lymphedema, hemochromatosis who was sent in from [facility] with rapid atrial fibrillation. She states over the last couple of months she has felt lightheaded and nauseous at times. No palpitations, chest pain, shortness of breath. She is able to complete exercises at home. She has not seen her cardiologist Dr. Gonzalez in over a year. No recent cardiac testing.    Past Medical/Surgical History:  HTN, HL, DVT, lymphedema, hemochromatosis     Medications:  Furosemide 40 mg prn  Rivaroxaban 20 mg daily  Atenolol    Family History: Non-contributory family history of premature cardiovascular atherosclerotic disease    Social History: No tobacco, alcohol or drug use    Review of Systems:  General: No fevers, chills, weight loss or gain  Skin: No rashes, color changes  Cardiovascular: No chest pain, orthopnea  Respiratory: No shortness of breath, cough  Gastrointestinal: No nausea, abdominal pain  Genitourinary: No incontinence, pain with urination  Musculoskeletal: No pain, swelling, decreased range of motion  Neurological: No headache, weakness  Psychiatric: No depression, anxiety  Endocrine: No weight loss or gain, increased thirst  All other systems are comprehensively negative.    Physical Exam:  Vitals:        Vital Signs Last 24 Hrs  T(C): 36.8 (24 Jun 2021 19:20), Max: 36.8 (24 Jun 2021 19:20)  T(F): 98.2 (24 Jun 2021 19:20), Max: 98.2 (24 Jun 2021 19:20)  HR: 149 (24 Jun 2021 20:10) (149 - 164)  BP: 129/87 (24 Jun 2021 20:10) (129/87 - 141/68)  BP(mean): --  RR: 20 (24 Jun 2021 20:10) (18 - 20)  SpO2: 100% (24 Jun 2021 20:10) (98% - 100%)  General: NAD  HEENT: Facial ecchymosis  Neck: No JVD, no carotid bruit  Lungs: CTAB  CV: Irregular, tachycardic, nl S1/S2, no M/R/G  Abdomen: S/NT/ND, +BS  Extremities: 1+ LE edema, +lymphedema, no cyanosis  Neuro: AAOx3, non-focal  Skin: No rash    Labs:                        13.1   9.51  )-----------( 214      ( 24 Jun 2021 18:23 )             39.6     06-24    140  |  101  |  11  ----------------------------<  124<H>  3.3<L>   |  24  |  0.84    Ca    8.3<L>      24 Jun 2021 18:23  Mg     1.6     06-24    TPro  7.3  /  Alb  3.2<L>  /  TBili  2.4<H>  /  DBili  x   /  AST  62<H>  /  ALT  27  /  AlkPhos  106  06-24    CARDIAC MARKERS ( 24 Jun 2021 18:23 )  <.015 ng/mL / x     / x     / x     / <1.0 ng/mL          ECG: AF, LAD, nonspecific ST abnormality    
Date/Time Patient Seen:  		  Referring MD:   Data Reviewed	       Patient is a 71y old  Female who presents with a chief complaint of Dizzy (26 Jun 2021 11:38)      Subjective/HPI  in bed  seen and examined  vs and meds reviewed  labs reviewed  imaging reviewed    spoke with emergency contact     68 year old female with a history of HTN, HL, DVT, lymphedema, hemochromatosis who was sent in from [facility] with rapid atrial fibrillation. She states over the last couple of months she has felt lightheaded and nauseous at times. No palpitations, chest pain, shortness of breath. She is able to complete exercises at home. She has not seen her cardiologist Dr. Gonzalez in over a year. No recent cardiac testing.    drinker  non smoker  lives alone  no pets  retired  no immediate family    FAMILY HISTORY:  Father  Still living? Unknown  Family history of colon cancer, Age at diagnosis: Age Unknown    Mother  Still living? Unknown  Family history of cerebrovascular accident (CVA), Age at diagnosis: Age Unknown    Sibling  Still living? Unknown  Family history of hemochromatosis, Age at diagnosis: Age Unknown.     Tobacco Screening:  · Core Measure Site	Yes  · Has the patient used tobacco in the past 30 days?	No    Risk Assessment:    Present on Admission:  Deep Venous Thrombosis	no  Pulmonary Embolus	no     Heart Failure:  Does this patient have a history of or has been diagnosed with heart failure? yes.     LV Function Assessment (LVS function was evaluated before arrival and/or during hospitalization) unknown.       PAST MEDICAL & SURGICAL HISTORY:  HTN (hypertension), benign    CHF (congestive heart failure)    Pulmonary embolism    HLD (hyperlipidemia)    HTN (hypertension)    Hemochromatosis    S/P D&amp;C (status post dilation and curettage)  with IUD insertion in 2013    No significant past surgical history          Medication list         MEDICATIONS  (STANDING):  ammonium lactate 12% Lotion 1 Application(s) Topical two times a day  cefTRIAXone   IVPB      cefTRIAXone   IVPB 1000 milliGRAM(s) IV Intermittent every 24 hours  diltiazem    milliGRAM(s) Oral daily  folic acid 1 milliGRAM(s) Oral daily  metoprolol tartrate 100 milliGRAM(s) Oral two times a day  multivitamin 1 Tablet(s) Oral daily  OLANZapine Injectable 2.5 milliGRAM(s) IntraMuscular every 6 hours  rivaroxaban 20 milliGRAM(s) Oral with dinner  thiamine 100 milliGRAM(s) Oral daily    MEDICATIONS  (PRN):  LORazepam   Injectable 1 milliGRAM(s) IV Push every 2 hours PRN CIWA-Ar score increase by 2 points and a total score of 7 or less  LORazepam   Injectable 1 milliGRAM(s) IV Push every 1 hour PRN CIWA-Ar score 8 or greater         Vitals log        ICU Vital Signs Last 24 Hrs  T(C): 36.7 (27 Jun 2021 04:54), Max: 36.9 (26 Jun 2021 18:30)  T(F): 98 (27 Jun 2021 04:54), Max: 98.5 (26 Jun 2021 18:30)  HR: 109 (27 Jun 2021 04:54) (85 - 121)  BP: 143/92 (27 Jun 2021 04:54) (124/85 - 143/92)  BP(mean): --  ABP: --  ABP(mean): --  RR: 17 (27 Jun 2021 04:54) (17 - 18)  SpO2: 95% (27 Jun 2021 04:54) (92% - 97%)           Input and Output:  I&O's Detail      Lab Data                        11.5   7.49  )-----------( 214      ( 27 Jun 2021 06:57 )             35.4     06-26    145  |  106  |  12  ----------------------------<  119<H>  3.4<L>   |  29  |  0.73    Ca    8.5      26 Jun 2021 19:45  Phos  3.0     06-26  Mg     1.6     06-26    TPro  6.6  /  Alb  3.2<L>  /  TBili  0.7  /  DBili  .30<H>  /  AST  37  /  ALT  23  /  AlkPhos  97  06-26            Review of Systems	  confusion    Objective     Physical Examination  heart s1s2  lung dec BS  black eye  head nc  abd soft  verbal        Pertinent Lab findings & Imaging      Mckninon:  NO   Adequate UO     I&O's Detail           Discussed with:     Cultures:	        Radiology      EXAM:  CT BRAIN                            PROCEDURE DATE:  06/26/2021          INTERPRETATION:  CLINICAL Indications:  confusion    COMPARISON: CT head dated 6/24/2021    TECHNIQUE: Noncontrast CT of the head. Multiplanar reformations are submitted.    FINDINGS:  There is periventricular and subcortical white matter hypodensity without mass effect, nonspecific, likely representing mild chronic microvascular ischemic changes. There is no compelling evidence for an acute transcortical infarction. There is no evidence of mass, mass effect, midline shift or extra-axial fluid collection. The lateral ventricles and cortical sulci are age-appropriate in size and configuration. The orbits, mastoid air cells and visualized paranasal sinuses are unremarkable. The calvarium is intact. Consider MRI as clinically warranted    IMPRESSION:  Mild chronic microvascular changes without evidence of an acute transcortical infarction or hemorrhage.            TIFFANY CARROLL MD; Attending Radiologist  This document has been electronically signed. Jun 26 2021  9:16AM        PROCEDURE DATE:  06/25/2021        INTERPRETATION:  Ordering Physician: SANTA POZO [id number]    Indication: Atrial fibrillation    Technician: AS    Study Quality: Technically difficult  A complete echocardiographic study was performed utilizing standard protocol including spectral and color Doppler in all echocardiographic windows.    Height: 175 cm  Weight: 108 kg  BSA: 2.3 m2  Blood Pressure: 107/72 mmHg    MEASUREMENTS  IVS: 1.2 cm  PWT: 1.0 cm  LA: 3.5 cm  AO: 3.0 cm  LVIDd: 4.2 cm  LVIDs: 2.8 cm    LVEF: 55%  RVSP: 34 mmHg  RA Pressure: 8 mmHg    FINDINGS  Left Ventricle: The left ventricle is normal in size. Increased left ventricular wall thickness. The left ventricular systolic function is not well visualized but appears grossly normal with an EF of 55%.  Right Ventricle: The right ventricle is dilated with normal function.  Left Atrium: The left atrium is dilated.  Right Atrium: The right atrium is dilated.  Mitral Valve: The mitral valve is structurally normal. Trace mitral regurgitation.  Aortic Valve: The aortic valve is grossly normal. No aortic regurgitation.  Tricuspid Valve: The tricuspid valve is grossly normal. Moderate tricuspid regurgitation. Estimated pulmonary artery systolic pressure is 34 mmHg.  Pulmonic Valve: The pulmonic valve is not well visualized.  Diastolic Function: Normal diastolic function.  Pericardium/Pleura: No pericardial effusion visualized.  Aorta: The aortic root is normal in size.    IMPRESSION:  1. Normal left ventricular systolic function.  2. Increased left ventricular wall thickness.  3. Dilated left atrium, right atrium, right ventricle.  4. Moderate tricuspid regurgitation.                
    HPI:  68 year old female with a history of HTN, HL, DVT, lymphedema, hemochromatosis admitted on 6/24/21 with rapid atrial fibrillation and UTI completed 3 days course of Rocephin. Course in hospital complicated by ETOH withdrawal. PT unable to provide history. NO n/v/d CP SOB. Lethargic but arousable. On CIWA protocol. Now with new fevers. Tmax 102.5 WBC 13K    Infectious Disease consult was called to evaluate pt and for antibiotic management.    Past Medical & Surgical Hx:  PAST MEDICAL & SURGICAL HISTORY:  HTN (hypertension), benign  CHF (congestive heart failure)  Pulmonary embolism  HLD (hyperlipidemia)  HTN (hypertension)  Hemochromatosis  S/P D&amp;C (status post dilation and curettage)  with IUD insertion in 2013  No significant past surgical history      Social History--  EtOH: as above  Tobacco: denies   Drug Use: denies       FAMILY HISTORY:  Family history of cerebrovascular accident (CVA) (Mother)    Family history of colon cancer (Father)    Family history of hemochromatosis (Sibling)        Allergies  No Known Allergies    Intolerances  NONE      Home Medications:  atorvastatin 10 mg oral tablet: 1 tab(s) orally once a day (25 Jun 2021 11:36)  dilTIAZem 180 mg/24 hours oral tablet, extended release: 1 tab(s) orally once a day (25 Jun 2021 11:36)  Keppra 500 mg oral tablet: 1 tab(s) orally once a day (25 Jun 2021 11:36)  Lasix 40 mg oral tablet: 2 tab(s) orally once a day (25 Jun 2021 11:36)  Xarelto 20 mg oral tablet: 1 tab(s) orally once a day (in the evening) (25 Jun 2021 11:36)    Current Inpatient Medications :    ANTIBIOTICS:       OTHER RELEVANT MEDICATIONS :  acetaminophen   Tablet .. 650 milliGRAM(s) Oral every 6 hours PRN  ammonium lactate 12% Lotion 1 Application(s) Topical two times a day  digoxin     Tablet 250 MICROGram(s) Oral daily  diltiazem    milliGRAM(s) Oral daily  folic acid 1 milliGRAM(s) Oral daily  LORazepam   Injectable 1 milliGRAM(s) IV Push every 2 hours PRN  LORazepam   Injectable 1 milliGRAM(s) IV Push every 1 hour PRN  LORazepam   Injectable 0.5 milliGRAM(s) IV Push every 8 hours  metoprolol tartrate 100 milliGRAM(s) Oral two times a day  multivitamin 1 Tablet(s) Oral daily  OLANZapine Injectable 2.5 milliGRAM(s) IntraMuscular every 6 hours PRN  rivaroxaban 20 milliGRAM(s) Oral with dinner      ROS:  Unable to obtain due to : Lethargic        I&O's Detail    29 Jun 2021 07:01  -  30 Jun 2021 07:00  --------------------------------------------------------  IN:    Oral Fluid: 240 mL  Total IN: 240 mL    OUT:    Voided (mL): 350 mL  Total OUT: 350 mL    Total NET: -110 mL      30 Jun 2021 07:01  -  30 Jun 2021 18:40  --------------------------------------------------------  IN:    Oral Fluid: 120 mL  Total IN: 120 mL    OUT:    Voided (mL): 225 mL  Total OUT: 225 mL    Total NET: -105 mL      Physical Exam:  Vital Signs Last 24 Hrs  T(C): 39.2 (30 Jun 2021 11:30), Max: 39.2 (30 Jun 2021 11:30)  T(F): 102.5 (30 Jun 2021 11:30), Max: 102.5 (30 Jun 2021 11:30)  HR: 92 (30 Jun 2021 11:30) (81 - 122)  BP: 128/73 (30 Jun 2021 11:30) (127/84 - 135/82)  RR: 16 (30 Jun 2021 11:30) (16 - 18)  SpO2: 92% (30 Jun 2021 11:30) (92% - 94%)      General: Lethargic no acute distress  Neck: supple, trachea midline  Lungs: clear, no wheeze/rhonchi  Cardiovascular: regular rate and rhythm, S1 S2  Abdomen: soft, nontender, ND, bowel sounds normal  Neurological:  Lethargic  Skin: no rash  Extremities: + lymphedema with chronic changes    Labs:                         12.6   13.08 )-----------( 246      ( 29 Jun 2021 07:10 )             37.9     06-30    141  |  107  |  11  ----------------------------<  119<H>  3.4<L>   |  21<L>  |  0.58    Ca    8.7      30 Jun 2021 07:32  Mg     1.7     06-30      RECENT CULTURES:    Culture - Urine (collected 29 Jun 2021 15:06)  Source: .Urine Clean Catch (Midstream)  Final Report (30 Jun 2021 12:56):    No growth    Culture - Blood (collected 29 Jun 2021 12:13)  Source: .Blood Blood  Preliminary Report (30 Jun 2021 13:01):    No growth to date.    Culture - Blood (collected 29 Jun 2021 12:13)  Source: .Blood Blood  Preliminary Report (30 Jun 2021 13:01):    No growth to date.    RADIOLOGY & ADDITIONAL STUDIES:    Assessment :   68 year old female with a history of HTN, HL, DVT, lymphedema, hemochromatosis admitted on 6/24/21 with rapid atrial fibrillation and UTI completed 3 days course of Rocephin. Course in hospital complicated by ETOH withdrawal.  Lethargic but arousable. On CIWA protocol. Now with new fevers. Tmax 102.5 WBC 13K No n/v/d CP SOB. IV sites c/d/i  ?asp pna  UTI ruled out  Bladder scan 220ml    Plan :   Start empiric Zosyn  CT chest  Cultures done yesterday NGTD  Trend temps and cbc    Continue with present regiment .  Approptiate use of antibiotics and adverse effects reviewed.      > 45 minutes spent in direct patient care reviewing  the notes, lab data/ imaging , discussion with multidisciplinary team. All questions were addressed and answered to the best of my capacity .    Thank you for allowing me to participate in the care of your patient .      Jazmyne Gamez MD  Infectious Disease  [phone]
  Patient is a 71y old  Female who presents with a chief complaint of Dizzy (03 Jul 2021 10:14)      Reason For Consult: elev PTH    HPI:  The patient is a 68 year old female with a history of HTN, HL, DVT, lymphedema, hemochromatosis who was sent in from [facility] with rapid atrial fibrillation. She states over the last couple of months she has felt lightheaded and nauseous at times. No palpitations, chest pain, shortness of breath. She is able to complete exercises at home. She has not seen her cardiologist Dr. Gonzalez in over a year. No recent cardiac testing.   (25 Jun 2021 10:31)      PAST MEDICAL & SURGICAL HISTORY:  HTN (hypertension), benign    CHF (congestive heart failure)    Pulmonary embolism    HLD (hyperlipidemia)    HTN (hypertension)    Hemochromatosis    S/P D&amp;C (status post dilation and curettage)  with IUD insertion in 2013    No significant past surgical history        FAMILY HISTORY:  Family history of cerebrovascular accident (CVA) (Mother)    Family history of colon cancer (Father)    Family history of hemochromatosis (Sibling)          Social History:    MEDICATIONS  (STANDING):  ammonium lactate 12% Lotion 1 Application(s) Topical two times a day  digoxin     Tablet 250 MICROGram(s) Oral daily  diltiazem    milliGRAM(s) Oral daily  folic acid 1 milliGRAM(s) Oral daily  melatonin 5 milliGRAM(s) Oral at bedtime  metoprolol tartrate 100 milliGRAM(s) Oral two times a day  multivitamin 1 Tablet(s) Oral daily  nystatin Powder 1 Application(s) Topical every 12 hours  rivaroxaban 20 milliGRAM(s) Oral with dinner    MEDICATIONS  (PRN):  acetaminophen   Tablet .. 650 milliGRAM(s) Oral every 6 hours PRN Temp greater or equal to 38C (100.4F)  acetaminophen   Tablet .. 650 milliGRAM(s) Oral every 6 hours PRN Mild Pain (1 - 3)  LORazepam   Injectable 1 milliGRAM(s) IV Push every 2 hours PRN CIWA-Ar score increase by 2 points and a total score of 7 or less  LORazepam   Injectable 1 milliGRAM(s) IV Push every 1 hour PRN CIWA-Ar score 8 or greater  OLANZapine Injectable 2.5 milliGRAM(s) IntraMuscular every 6 hours PRN agitation        T(C): 36.8 (07-03-21 @ 05:27), Max: 37.3 (07-02-21 @ 18:20)  HR: 88 (07-03-21 @ 05:27) (88 - 110)  BP: 133/81 (07-03-21 @ 05:27) (124/85 - 133/81)  RR: 18 (07-03-21 @ 05:27) (18 - 18)  SpO2: 97% (07-03-21 @ 05:27) (95% - 97%)  Wt(kg): --    PHYSICAL EXAM:  GENERAL: NAD, well-groomed, well-developed  HEAD:  Atraumatic, Normocephalic  NECK: Supple, No JVD, Normal thyroid  CHEST/LUNG: Clear to percussion bilaterally; No rales, rhonchi, wheezing, or rubs  HEART: Regular rate and rhythm; No murmurs, rubs, or gallops  ABDOMEN: Soft, Nontender, Nondistended; Bowel sounds present  EXTREMITIES:  2+ Peripheral Pulses, No clubbing, cyanosis, or edema  SKIN: No rashes or lesions    CAPILLARY BLOOD GLUCOSE                                11.5   9.36  )-----------( 297      ( 03 Jul 2021 08:48 )             35.2       CMP:  07-03 @ 08:48  SGPT --  Albumin --   Alk Phos --   Anion Gap 7   SGOT --   Total Bili --   BUN 10   Calcium Total 9.3   CO2 26   Chloride 108   Creatinine 0.45   eGFR if    eGFR if non    Glucose 109   Potassium 3.5   Protein --   Sodium 141      Thyroid Function Tests:  07-01 @ 12:57 TSH 1.48 FreeT4 -- T3 -- Anti TPO -- Anti Thyroglobulin Ab -- TSI --      Diabetes Tests:       Radiology:               
Patient is a 71y old  Female who presents with a chief complaint of Dizzy (27 Jun 2021 11:01)      HPI:  The patient is a 68 year old female with a history of HTN, HL, DVT, lymphedema, hemochromatosis who was sent in from [facility] with rapid atrial fibrillation. She states over the last couple of months she has felt lightheaded and nauseous at times. No palpitations, chest pain, shortness of breath. She is able to complete exercises at home. She has not seen her cardiologist Dr. Gonzalez in over a year. No recent cardiac testing.   (25 Jun 2021 10:31)       ROS:  Negative except for: has not been since by Dr. Kitchen for 3 years.  followed for hereditary hemochromatosis on phlebotomy program with good control of ferritin    PAST MEDICAL & SURGICAL HISTORY:  HTN (hypertension), benign    CHF (congestive heart failure)    Pulmonary embolism    HLD (hyperlipidemia)    HTN (hypertension)    Hemochromatosis    S/P D&amp;C (status post dilation and curettage)  with IUD insertion in 2013    No significant past surgical history        SOCIAL HISTORY:    FAMILY HISTORY:  Family history of cerebrovascular accident (CVA) (Mother)    Family history of colon cancer (Father)    Family history of hemochromatosis (Sibling)        MEDICATIONS  (STANDING):  ammonium lactate 12% Lotion 1 Application(s) Topical two times a day  cefTRIAXone   IVPB      cefTRIAXone   IVPB 1000 milliGRAM(s) IV Intermittent every 24 hours  diltiazem Infusion 10 mG/Hr (10 mL/Hr) IV Continuous <Continuous>  folic acid 1 milliGRAM(s) Oral daily  LORazepam   Injectable 0.5 milliGRAM(s) IV Push every 4 hours  metoprolol tartrate 100 milliGRAM(s) Oral two times a day  multivitamin 1 Tablet(s) Oral daily  OLANZapine Injectable 2.5 milliGRAM(s) IntraMuscular every 6 hours  rivaroxaban 20 milliGRAM(s) Oral with dinner  thiamine 100 milliGRAM(s) Oral daily    MEDICATIONS  (PRN):  LORazepam   Injectable 1 milliGRAM(s) IV Push every 2 hours PRN CIWA-Ar score increase by 2 points and a total score of 7 or less  LORazepam   Injectable 1 milliGRAM(s) IV Push every 1 hour PRN CIWA-Ar score 8 or greater      Allergies    No Known Allergies    Intolerances        Vital Signs Last 24 Hrs  T(C): 36.6 (27 Jun 2021 19:59), Max: 36.7 (27 Jun 2021 04:54)  T(F): 97.8 (27 Jun 2021 19:59), Max: 98 (27 Jun 2021 04:54)  HR: 108 (27 Jun 2021 19:59) (91 - 109)  BP: 135/93 (27 Jun 2021 19:59) (126/85 - 143/92)  BP(mean): --  RR: 17 (27 Jun 2021 19:59) (17 - 19)  SpO2: 95% (27 Jun 2021 19:59) (94% - 95%)    PHYSICAL EXAM  General: adult in NAD  HEENT: clear oropharynx, anicteric sclera, pink conjunctivae  Neck: supple  CV: normal S1S2 with no murmur rubs or gallops  Lungs: clear to auscultation, no wheezes, no rhales  Abdomen: soft non-tender non-distended, no hepato/splenomegaly  Ext: no clubbing cyanosis or edema  Skin: no rashes and no petichiae  Neuro: alert and oriented X3 no focal deficits      LABS:    CBC Full  -  ( 27 Jun 2021 06:57 )  WBC Count : 7.49 K/uL  RBC Count : 3.44 M/uL  Hemoglobin : 11.5 g/dL  Hematocrit : 35.4 %  Platelet Count - Automated : 214 K/uL  Mean Cell Volume : 102.9 fl  Mean Cell Hemoglobin : 33.4 pg  Mean Cell Hemoglobin Concentration : 32.5 gm/dL  Auto Neutrophil # : x  Auto Lymphocyte # : x  Auto Monocyte # : x  Auto Eosinophil # : x  Auto Basophil # : x  Auto Neutrophil % : x  Auto Lymphocyte % : x  Auto Monocyte % : x  Auto Eosinophil % : x  Auto Basophil % : x    06-27    146<H>  |  108  |  11  ----------------------------<  98  3.0<L>   |  29  |  0.58    Ca    8.4<L>      27 Jun 2021 06:57  Phos  3.3     06-27  Mg     1.7     06-27    TPro  6.6  /  Alb  3.2<L>  /  TBili  0.7  /  DBili  .30<H>  /  AST  37  /  ALT  23  /  AlkPhos  97  06-26      Ferritin, Serum: 165 ng/mL (06-27 @ 13:20)          BLOOD SMEAR INTERPRETATION:    RADIOLOGY & ADDITIONAL STUDIES:

## 2021-07-03 NOTE — PROGRESS NOTE ADULT - SUBJECTIVE AND OBJECTIVE BOX
Patient is a 71y old  Female who presents with a chief complaint of Dizzy (03 Jul 2021 06:02)      INTERVAL HPI/OVERNIGHT EVENTS: Patient seen and examined. NAD. No complaints. Patient awake and alert.    Vital Signs Last 24 Hrs  T(C): 36.8 (03 Jul 2021 05:27), Max: 37.3 (02 Jul 2021 18:20)  T(F): 98.2 (03 Jul 2021 05:27), Max: 99.1 (02 Jul 2021 18:20)  HR: 88 (03 Jul 2021 05:27) (88 - 110)  BP: 133/81 (03 Jul 2021 05:27) (124/85 - 133/81)  BP(mean): --  RR: 18 (03 Jul 2021 05:27) (18 - 18)  SpO2: 97% (03 Jul 2021 05:27) (95% - 97%)    07-03    141  |  108  |  10  ----------------------------<  109<H>  3.5   |  26  |  0.45<L>    Ca    9.3      03 Jul 2021 08:48  Mg     1.9     07-03                            11.5   9.36  )-----------( 297      ( 03 Jul 2021 08:48 )             35.2       CAPILLARY BLOOD GLUCOSE                  acetaminophen   Tablet .. 650 milliGRAM(s) Oral every 6 hours PRN  ammonium lactate 12% Lotion 1 Application(s) Topical two times a day  digoxin     Tablet 250 MICROGram(s) Oral daily  diltiazem    milliGRAM(s) Oral daily  enoxaparin Injectable 100 milliGRAM(s) SubCutaneous every 12 hours  folic acid 1 milliGRAM(s) Oral daily  LORazepam   Injectable 1 milliGRAM(s) IV Push every 2 hours PRN  LORazepam   Injectable 1 milliGRAM(s) IV Push every 1 hour PRN  melatonin 5 milliGRAM(s) Oral at bedtime  metoprolol tartrate 100 milliGRAM(s) Oral two times a day  multivitamin 1 Tablet(s) Oral daily  nystatin Powder 1 Application(s) Topical every 12 hours  OLANZapine Injectable 2.5 milliGRAM(s) IntraMuscular every 6 hours PRN              REVIEW OF SYSTEMS:  CONSTITUTIONAL: No fever, no weight loss, or no fatigue  NECK: No pain, no stiffness  RESPIRATORY: No cough, no wheezing, no chills, no hemoptysis, No shortness of breath  CARDIOVASCULAR: No chest pain, no palpitations, no dizziness, no leg swelling  GASTROINTESTINAL: No abdominal pain. No nausea, no vomiting, no hematemesis; No diarrhea, no constipation. No melena, no hematochezia.  GENITOURINARY: No dysuria, no frequency, no hematuria, no incontinence  NEUROLOGICAL: No headaches, no loss of strength, no numbness, no tremors  SKIN: No itching, no burning  MUSCULOSKELETAL: No joint pain, no swelling; No muscle, no back, no extremity pain  PSYCHIATRIC: No depression, no mood swings,   HEME/LYMPH: No easy bruising, no bleeding gums  ALLERY AND IMMUNOLOGIC: No hives       Consultant(s) Notes Reviewed:  [X] YES  [ ] NO    PHYSICAL EXAM:  GENERAL: NAD  HEAD:  Atraumatic, Normocephalic  EYES: EOMI, PERRLA, conjunctiva and sclera clear  ENMT: No tonsillar erythema, exudates, or enlargement; Moist mucous membranes  NECK: Supple, No JVD  NERVOUS SYSTEM:  Awake & alert  CHEST/LUNG: Clear to auscultation bilaterally; No rales, rhonchi, wheezing,  HEART: Regular rate and rhythm  ABDOMEN: Soft, Nontender, Nondistended; Bowel sounds present  EXTREMITIES:  No clubbing, cyanosis, or edema  LYMPH: No lymphadenopathy noted  SKIN: No rashes      Advanced care planning discussed with patient/family [X] YES   [ ] NO    Advanced care planning discussed with patient/family. Patient's health status was discussed. All appropriate changes have been made regarding patient's end-of-life care. Advanced care planning forms reviewed/discussed/completed.  20 minutes spent.

## 2021-07-03 NOTE — PROGRESS NOTE ADULT - PROBLEM SELECTOR PLAN 4
Finished three days of rocephin  Culture data noted -- klebsiella sensitive to rocephin  No more fevers with no clear source -- monitor off abx for now  Cultures NTD  ID consult noted

## 2021-07-03 NOTE — PROGRESS NOTE ADULT - SUBJECTIVE AND OBJECTIVE BOX
Date/Time Patient Seen:  		  Referring MD:   Data Reviewed	       Patient is a 71y old  Female who presents with a chief complaint of Dizzy (02 Jul 2021 16:25)      Subjective/HPI     PAST MEDICAL & SURGICAL HISTORY:  HTN (hypertension), benign    CHF (congestive heart failure)    Pulmonary embolism    HLD (hyperlipidemia)    HTN (hypertension)    Hemochromatosis    S/P D&amp;C (status post dilation and curettage)  with IUD insertion in 2013    No significant past surgical history          Medication list         MEDICATIONS  (STANDING):  ammonium lactate 12% Lotion 1 Application(s) Topical two times a day  digoxin     Tablet 250 MICROGram(s) Oral daily  diltiazem    milliGRAM(s) Oral daily  enoxaparin Injectable 100 milliGRAM(s) SubCutaneous every 12 hours  folic acid 1 milliGRAM(s) Oral daily  melatonin 5 milliGRAM(s) Oral at bedtime  metoprolol tartrate 100 milliGRAM(s) Oral two times a day  multivitamin 1 Tablet(s) Oral daily  nystatin Powder 1 Application(s) Topical every 12 hours  piperacillin/tazobactam IVPB.. 3.375 Gram(s) IV Intermittent every 8 hours    MEDICATIONS  (PRN):  acetaminophen   Tablet .. 650 milliGRAM(s) Oral every 6 hours PRN Temp greater or equal to 38C (100.4F)  LORazepam   Injectable 1 milliGRAM(s) IV Push every 2 hours PRN CIWA-Ar score increase by 2 points and a total score of 7 or less  LORazepam   Injectable 1 milliGRAM(s) IV Push every 1 hour PRN CIWA-Ar score 8 or greater  OLANZapine Injectable 2.5 milliGRAM(s) IntraMuscular every 6 hours PRN agitation         Vitals log        ICU Vital Signs Last 24 Hrs  T(C): 36.8 (03 Jul 2021 05:27), Max: 37.6 (02 Jul 2021 09:24)  T(F): 98.2 (03 Jul 2021 05:27), Max: 99.7 (02 Jul 2021 09:24)  HR: 88 (03 Jul 2021 05:27) (75 - 110)  BP: 133/81 (03 Jul 2021 05:27) (124/85 - 134/75)  BP(mean): --  ABP: --  ABP(mean): --  RR: 18 (03 Jul 2021 05:27) (18 - 18)  SpO2: 97% (03 Jul 2021 05:27) (95% - 97%)           Input and Output:  I&O's Detail    01 Jul 2021 07:01  -  02 Jul 2021 07:00  --------------------------------------------------------  IN:    IV PiggyBack: 200 mL    Oral Fluid: 240 mL  Total IN: 440 mL    OUT:    Voided (mL): 1200 mL  Total OUT: 1200 mL    Total NET: -760 mL      02 Jul 2021 07:01  -  03 Jul 2021 06:02  --------------------------------------------------------  IN:  Total IN: 0 mL    OUT:    Voided (mL): 1000 mL  Total OUT: 1000 mL    Total NET: -1000 mL          Lab Data                        11.9   10.99 )-----------( 276      ( 02 Jul 2021 07:40 )             36.5     07-02    142  |  108  |  17  ----------------------------<  95  3.6   |  24  |  0.61    Ca    9.3      02 Jul 2021 07:40  Mg     2.0     07-02              Review of Systems	      Objective     Physical Examination    heart s1s2  lung dec BS  abd soft  obese  verbal    Pertinent Lab findings & Imaging      Ino:  NO   Adequate UO     I&O's Detail    01 Jul 2021 07:01  -  02 Jul 2021 07:00  --------------------------------------------------------  IN:    IV PiggyBack: 200 mL    Oral Fluid: 240 mL  Total IN: 440 mL    OUT:    Voided (mL): 1200 mL  Total OUT: 1200 mL    Total NET: -760 mL      02 Jul 2021 07:01  -  03 Jul 2021 06:02  --------------------------------------------------------  IN:  Total IN: 0 mL    OUT:    Voided (mL): 1000 mL  Total OUT: 1000 mL    Total NET: -1000 mL               Discussed with:     Cultures:	        Radiology

## 2021-07-04 LAB
24R-OH-CALCIDIOL SERPL-MCNC: 18.8 NG/ML — LOW (ref 30–80)
ANION GAP SERPL CALC-SCNC: 8 MMOL/L — SIGNIFICANT CHANGE UP (ref 5–17)
BUN SERPL-MCNC: 17 MG/DL — SIGNIFICANT CHANGE UP (ref 7–23)
CALCIUM SERPL-MCNC: 9.3 MG/DL — SIGNIFICANT CHANGE UP (ref 8.5–10.1)
CHLORIDE SERPL-SCNC: 109 MMOL/L — HIGH (ref 96–108)
CO2 SERPL-SCNC: 25 MMOL/L — SIGNIFICANT CHANGE UP (ref 22–31)
CREAT SERPL-MCNC: 0.64 MG/DL — SIGNIFICANT CHANGE UP (ref 0.5–1.3)
CULTURE RESULTS: SIGNIFICANT CHANGE UP
CULTURE RESULTS: SIGNIFICANT CHANGE UP
GLUCOSE SERPL-MCNC: 105 MG/DL — HIGH (ref 70–99)
HCT VFR BLD CALC: 33.7 % — LOW (ref 34.5–45)
HGB BLD-MCNC: 11.2 G/DL — LOW (ref 11.5–15.5)
MAGNESIUM SERPL-MCNC: 1.8 MG/DL — SIGNIFICANT CHANGE UP (ref 1.6–2.6)
MCHC RBC-ENTMCNC: 33.2 GM/DL — SIGNIFICANT CHANGE UP (ref 32–36)
MCHC RBC-ENTMCNC: 33.7 PG — SIGNIFICANT CHANGE UP (ref 27–34)
MCV RBC AUTO: 101.5 FL — HIGH (ref 80–100)
NRBC # BLD: 0 /100 WBCS — SIGNIFICANT CHANGE UP (ref 0–0)
PLATELET # BLD AUTO: 354 K/UL — SIGNIFICANT CHANGE UP (ref 150–400)
POTASSIUM SERPL-MCNC: 3.5 MMOL/L — SIGNIFICANT CHANGE UP (ref 3.5–5.3)
POTASSIUM SERPL-SCNC: 3.5 MMOL/L — SIGNIFICANT CHANGE UP (ref 3.5–5.3)
RBC # BLD: 3.32 M/UL — LOW (ref 3.8–5.2)
RBC # FLD: 15.6 % — HIGH (ref 10.3–14.5)
SODIUM SERPL-SCNC: 142 MMOL/L — SIGNIFICANT CHANGE UP (ref 135–145)
SPECIMEN SOURCE: SIGNIFICANT CHANGE UP
SPECIMEN SOURCE: SIGNIFICANT CHANGE UP
WBC # BLD: 12.06 K/UL — HIGH (ref 3.8–10.5)
WBC # FLD AUTO: 12.06 K/UL — HIGH (ref 3.8–10.5)

## 2021-07-04 RX ADMIN — Medication 100 MILLIGRAM(S): at 17:35

## 2021-07-04 RX ADMIN — Medication 1 TABLET(S): at 11:29

## 2021-07-04 RX ADMIN — NYSTATIN CREAM 1 APPLICATION(S): 100000 CREAM TOPICAL at 05:16

## 2021-07-04 RX ADMIN — Medication 1 APPLICATION(S): at 17:35

## 2021-07-04 RX ADMIN — Medication 5 MILLIGRAM(S): at 21:06

## 2021-07-04 RX ADMIN — Medication 100 MILLIGRAM(S): at 05:15

## 2021-07-04 RX ADMIN — Medication 250 MICROGRAM(S): at 05:15

## 2021-07-04 RX ADMIN — Medication 1 APPLICATION(S): at 05:16

## 2021-07-04 RX ADMIN — Medication 1 MILLIGRAM(S): at 11:29

## 2021-07-04 RX ADMIN — RIVAROXABAN 20 MILLIGRAM(S): KIT at 17:35

## 2021-07-04 RX ADMIN — Medication 360 MILLIGRAM(S): at 05:15

## 2021-07-04 RX ADMIN — Medication 650 MILLIGRAM(S): at 20:13

## 2021-07-04 NOTE — PROGRESS NOTE ADULT - SUBJECTIVE AND OBJECTIVE BOX
Date/Time Patient Seen:  		  Referring MD:   Data Reviewed	       Patient is a 71y old  Female who presents with a chief complaint of Dizzy (03 Jul 2021 19:10)      Subjective/HPI     PAST MEDICAL & SURGICAL HISTORY:  HTN (hypertension), benign    CHF (congestive heart failure)    Pulmonary embolism    HLD (hyperlipidemia)    HTN (hypertension)    Hemochromatosis    S/P D&amp;C (status post dilation and curettage)  with IUD insertion in 2013    No significant past surgical history          Medication list         MEDICATIONS  (STANDING):  ammonium lactate 12% Lotion 1 Application(s) Topical two times a day  digoxin     Tablet 250 MICROGram(s) Oral daily  diltiazem    milliGRAM(s) Oral daily  folic acid 1 milliGRAM(s) Oral daily  melatonin 5 milliGRAM(s) Oral at bedtime  metoprolol tartrate 100 milliGRAM(s) Oral two times a day  multivitamin 1 Tablet(s) Oral daily  nystatin Powder 1 Application(s) Topical every 12 hours  rivaroxaban 20 milliGRAM(s) Oral with dinner    MEDICATIONS  (PRN):  acetaminophen   Tablet .. 650 milliGRAM(s) Oral every 6 hours PRN Temp greater or equal to 38C (100.4F)  acetaminophen   Tablet .. 650 milliGRAM(s) Oral every 6 hours PRN Mild Pain (1 - 3)  LORazepam   Injectable 1 milliGRAM(s) IV Push every 2 hours PRN CIWA-Ar score increase by 2 points and a total score of 7 or less  LORazepam   Injectable 1 milliGRAM(s) IV Push every 1 hour PRN CIWA-Ar score 8 or greater  OLANZapine Injectable 2.5 milliGRAM(s) IntraMuscular every 6 hours PRN agitation         Vitals log        ICU Vital Signs Last 24 Hrs  T(C): 37.8 (04 Jul 2021 04:55), Max: 37.8 (04 Jul 2021 04:55)  T(F): 100.1 (04 Jul 2021 04:55), Max: 100.1 (04 Jul 2021 04:55)  HR: 106 (04 Jul 2021 04:55) (71 - 106)  BP: 115/77 (04 Jul 2021 04:55) (103/64 - 131/80)  BP(mean): --  ABP: --  ABP(mean): --  RR: 18 (04 Jul 2021 04:55) (18 - 18)  SpO2: 94% (04 Jul 2021 04:55) (92% - 97%)           Input and Output:  I&O's Detail    02 Jul 2021 07:01  -  03 Jul 2021 07:00  --------------------------------------------------------  IN:    IV PiggyBack: 200 mL  Total IN: 200 mL    OUT:    Voided (mL): 1000 mL  Total OUT: 1000 mL    Total NET: -800 mL      03 Jul 2021 07:01  -  04 Jul 2021 05:52  --------------------------------------------------------  IN:  Total IN: 0 mL    OUT:    Voided (mL): 3300 mL  Total OUT: 3300 mL    Total NET: -3300 mL          Lab Data                        11.5   9.36  )-----------( 297      ( 03 Jul 2021 08:48 )             35.2     07-03    141  |  108  |  10  ----------------------------<  109<H>  3.5   |  26  |  0.45<L>    Ca    9.3      03 Jul 2021 08:48  Mg     1.9     07-03              Review of Systems	      Objective     Physical Examination    obese  head nc  heart s1s2  lung no wheeze  abd soft      Pertinent Lab findings & Imaging      Ino:  NO   Adequate UO     I&O's Detail    02 Jul 2021 07:01  -  03 Jul 2021 07:00  --------------------------------------------------------  IN:    IV PiggyBack: 200 mL  Total IN: 200 mL    OUT:    Voided (mL): 1000 mL  Total OUT: 1000 mL    Total NET: -800 mL      03 Jul 2021 07:01  -  04 Jul 2021 05:52  --------------------------------------------------------  IN:  Total IN: 0 mL    OUT:    Voided (mL): 3300 mL  Total OUT: 3300 mL    Total NET: -3300 mL               Discussed with:     Cultures:	        Radiology

## 2021-07-04 NOTE — PROGRESS NOTE ADULT - SUBJECTIVE AND OBJECTIVE BOX
Patient is a 71y old  Female who presents with a chief complaint of Dizzy (04 Jul 2021 07:45)      INTERVAL HPI/OVERNIGHT EVENTS: Patient seen and examined. NAD. No complaints.    Vital Signs Last 24 Hrs  T(C): 37.8 (04 Jul 2021 04:55), Max: 37.8 (04 Jul 2021 04:55)  T(F): 100.1 (04 Jul 2021 04:55), Max: 100.1 (04 Jul 2021 04:55)  HR: 106 (04 Jul 2021 04:55) (71 - 106)  BP: 115/77 (04 Jul 2021 04:55) (103/64 - 131/80)  BP(mean): --  RR: 18 (04 Jul 2021 04:55) (18 - 18)  SpO2: 94% (04 Jul 2021 04:55) (92% - 97%)    07-04    142  |  109<H>  |  17  ----------------------------<  105<H>  3.5   |  25  |  0.64    Ca    9.3      04 Jul 2021 08:00  Mg     1.8     07-04                            11.2   12.06 )-----------( 354      ( 04 Jul 2021 08:00 )             33.7       CAPILLARY BLOOD GLUCOSE                  acetaminophen   Tablet .. 650 milliGRAM(s) Oral every 6 hours PRN  acetaminophen   Tablet .. 650 milliGRAM(s) Oral every 6 hours PRN  ammonium lactate 12% Lotion 1 Application(s) Topical two times a day  digoxin     Tablet 250 MICROGram(s) Oral daily  diltiazem    milliGRAM(s) Oral daily  folic acid 1 milliGRAM(s) Oral daily  LORazepam   Injectable 1 milliGRAM(s) IV Push every 2 hours PRN  LORazepam   Injectable 1 milliGRAM(s) IV Push every 1 hour PRN  melatonin 5 milliGRAM(s) Oral at bedtime  metoprolol tartrate 100 milliGRAM(s) Oral two times a day  multivitamin 1 Tablet(s) Oral daily  nystatin Powder 1 Application(s) Topical every 12 hours  OLANZapine Injectable 2.5 milliGRAM(s) IntraMuscular every 6 hours PRN  rivaroxaban 20 milliGRAM(s) Oral with dinner              REVIEW OF SYSTEMS:  CONSTITUTIONAL: No fever, no weight loss, or no fatigue  NECK: No pain, no stiffness  RESPIRATORY: No cough, no wheezing, no chills, no hemoptysis, No shortness of breath  CARDIOVASCULAR: No chest pain, no palpitations, no dizziness, no leg swelling  GASTROINTESTINAL: No abdominal pain. No nausea, no vomiting, no hematemesis; No diarrhea, no constipation. No melena, no hematochezia.  GENITOURINARY: No dysuria, no frequency, no hematuria, no incontinence  NEUROLOGICAL: No headaches, no loss of strength, no numbness, no tremors  SKIN: No itching, no burning  MUSCULOSKELETAL: No joint pain, no swelling; No muscle, no back, no extremity pain  PSYCHIATRIC: No depression, no mood swings,   HEME/LYMPH: No easy bruising, no bleeding gums  ALLERY AND IMMUNOLOGIC: No hives       Consultant(s) Notes Reviewed:  [X] YES  [ ] NO    PHYSICAL EXAM:  GENERAL: NAD  HEAD:  Atraumatic, Normocephalic  EYES: EOMI, PERRLA, conjunctiva and sclera clear  ENMT: No tonsillar erythema, exudates, or enlargement; Moist mucous membranes  NECK: Supple, No JVD  NERVOUS SYSTEM:  Awake & alert  CHEST/LUNG: Clear to auscultation bilaterally; No rales, rhonchi, wheezing,  HEART: Regular rate and rhythm  ABDOMEN: Soft, Nontender, Nondistended; Bowel sounds present  EXTREMITIES:  No clubbing, cyanosis, or edema  LYMPH: No lymphadenopathy noted  SKIN: No rashes      Advanced care planning discussed with patient/family [X] YES   [ ] NO    Advanced care planning discussed with patient/family. Patient's health status was discussed. All appropriate changes have been made regarding patient's end-of-life care. Advanced care planning forms reviewed/discussed/completed.  20 minutes spent.

## 2021-07-04 NOTE — PROGRESS NOTE ADULT - SUBJECTIVE AND OBJECTIVE BOX
Neurology follow up note    JOSAFAT [id number]      Interval History:    Patient feels ok no new complaints.    MEDICATIONS    acetaminophen   Tablet .. 650 milliGRAM(s) Oral every 6 hours PRN  acetaminophen   Tablet .. 650 milliGRAM(s) Oral every 6 hours PRN  ammonium lactate 12% Lotion 1 Application(s) Topical two times a day  digoxin     Tablet 250 MICROGram(s) Oral daily  diltiazem    milliGRAM(s) Oral daily  folic acid 1 milliGRAM(s) Oral daily  LORazepam   Injectable 1 milliGRAM(s) IV Push every 2 hours PRN  LORazepam   Injectable 1 milliGRAM(s) IV Push every 1 hour PRN  melatonin 5 milliGRAM(s) Oral at bedtime  metoprolol tartrate 100 milliGRAM(s) Oral two times a day  multivitamin 1 Tablet(s) Oral daily  nystatin Powder 1 Application(s) Topical every 12 hours  OLANZapine Injectable 2.5 milliGRAM(s) IntraMuscular every 6 hours PRN  rivaroxaban 20 milliGRAM(s) Oral with dinner      Allergies    No Known Allergies    Intolerances            Vital Signs Last 24 Hrs  T(C): 37.8 (04 Jul 2021 04:55), Max: 37.8 (04 Jul 2021 04:55)  T(F): 100.1 (04 Jul 2021 04:55), Max: 100.1 (04 Jul 2021 04:55)  HR: 106 (04 Jul 2021 04:55) (71 - 106)  BP: 115/77 (04 Jul 2021 04:55) (103/64 - 131/80)  BP(mean): --  RR: 18 (04 Jul 2021 04:55) (18 - 18)  SpO2: 94% (04 Jul 2021 04:55) (92% - 97%)      REVIEW OF SYSTEMS:  Constitutional:  The patient denies fever, chills, or night sweats.  Head:  At present, no headaches or lightheadedness.  Eyes:  No double vision, but does have blurry vision, suspect she thinks she has macular degeneration.  Ears:  No ringing in ears.  Neck:  No neck pain.  Respiratory:  No shortness of breath.  Cardiovascular:  No chest pain.  Abdomen:  No nausea, vomiting, or abdominal pain.  Extremities/Neurological:  Has numbness and tingling of her feet, but states she has herniated disc in her back.  This is not new.  Musculoskeletal:  Occasional joint pain, possibly from arthritis.    PHYSICAL EXAMINATION:  HEENT:  Head:  Positive trauma was noted over the left eye.  No scleral icterus was noted.  Ears:  Hearing bilaterally intact.  NECK:  Supple.  RESPIRATORY:  Good air entry bilaterally.  CARDIOVASCULAR:  S1 and S2 heard.  ABDOMEN:  Soft and nontender.  EXTREMITIES:  No clubbing was noted.      NEUROLOGIC:  The patient is awake   Location was hospital, year was 2021,  She was able to follow simple  but not complex commands  Extraocular movements were intact, full visual field.    Speech was fluent at times mumbles .  Smile was symmetric.  Motor:  Bilateral upper were 4/5, bilateral lower were 3-/5.   able to name objects                   LABS:  CBC Full  -  ( 04 Jul 2021 08:00 )  WBC Count : 12.06 K/uL  RBC Count : 3.32 M/uL  Hemoglobin : 11.2 g/dL  Hematocrit : 33.7 %  Platelet Count - Automated : 354 K/uL  Mean Cell Volume : 101.5 fl  Mean Cell Hemoglobin : 33.7 pg  Mean Cell Hemoglobin Concentration : 33.2 gm/dL  Auto Neutrophil # : x  Auto Lymphocyte # : x  Auto Monocyte # : x  Auto Eosinophil # : x  Auto Basophil # : x  Auto Neutrophil % : x  Auto Lymphocyte % : x  Auto Monocyte % : x  Auto Eosinophil % : x  Auto Basophil % : x      07-04    142  |  109<H>  |  17  ----------------------------<  105<H>  3.5   |  25  |  0.64    Ca    9.3      04 Jul 2021 08:00  Mg     1.9     07-03      Hemoglobin A1C:       Vitamin B12         RADIOLOGY      ANALYSIS AND PLAN:  This is a 71-year-old with an episode of altered mental status.  Suspect patients AMS is possible from the history of hemochromatosis with oxidative stress and iron leading to a chronic neurodegenerative deterioration in overall cognitive status which unfortunately will be progressive. Questionable, the patient has an worsening mild cognitive impairment that is progressively becoming worse with hospital setting leading to the hospital delirium, possible metabolic encephalopathy.  antibiotics as needed   For history of atrial fibrillation, anticoagulation as needed.  For history of hypertension, monitor systolic blood pressure.  For history of hyperlipidemia, condition the patient on statin.  Fall precautions.  monitor oxygen and CO2 levels as needed  recommend Psychiatry follow-up in regards to control of symptoms.   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 she understands that she will deteriorate cognitive wise as time goes on will most likely need placement       Greater than 38 minutes of time was spent with the patient, plan of care, reviewing data, with greater than 50% of the visit was spent counseling and/or coordinating care with multidisciplinary healthcare team

## 2021-07-04 NOTE — PROGRESS NOTE ADULT - PROBLEM SELECTOR PLAN 2
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

## 2021-07-04 NOTE — PROGRESS NOTE ADULT - SUBJECTIVE AND OBJECTIVE BOX
Patient is a 71y Female with a known history of :  Rapid atrial fibrillation [I48.91]    Pulmonary embolism [I26.99]    Dyslipidemia [E78.5]    Essential hypertension [I10]    Hypokalemia [E87.6]    Altered mental status [R41.82]    Acute UTI [N39.0]    Hemochromatosis [E83.119]    Elevated PTHrP level [R79.89]    Secondary hyperparathyroidism [N25.81]      HPI:  The patient is a 68 year old female with a history of HTN, HL, DVT, lymphedema, hemochromatosis who was sent in from [facility] with rapid atrial fibrillation. She states over the last couple of months she has felt lightheaded and nauseous at times. No palpitations, chest pain, shortness of breath. She is able to complete exercises at home. She has not seen her cardiologist Dr. Gonzalez in over a year. No recent cardiac testing.   (25 Jun 2021 10:31)      REVIEW OF SYSTEMS:    CONSTITUTIONAL: No fever, weight loss, or fatigue  EYES: No eye pain, visual disturbances, or discharge  ENMT:  No difficulty hearing, tinnitus, vertigo; No sinus or throat pain  NECK: No pain or stiffness  BREASTS: No pain, masses, or nipple discharge  RESPIRATORY: No cough, wheezing, chills or hemoptysis; No shortness of breath  CARDIOVASCULAR: No chest pain, palpitations, dizziness, or leg swelling  GASTROINTESTINAL: No abdominal or epigastric pain. No nausea, vomiting, or hematemesis; No diarrhea or constipation. No melena or hematochezia.  GENITOURINARY: No dysuria, frequency, hematuria, or incontinence  NEUROLOGICAL: No headaches, memory loss, loss of strength, numbness, or tremors  SKIN: No itching, burning, rashes, or lesions   LYMPH NODES: No enlarged glands  ENDOCRINE: No heat or cold intolerance; No hair loss  MUSCULOSKELETAL: No joint pain or swelling; No muscle, back, or extremity pain  PSYCHIATRIC: No depression, anxiety, mood swings, or difficulty sleeping  HEME/LYMPH: No easy bruising, or bleeding gums  ALLERGY AND IMMUNOLOGIC: No hives or eczema    MEDICATIONS  (STANDING):  ammonium lactate 12% Lotion 1 Application(s) Topical two times a day  digoxin     Tablet 250 MICROGram(s) Oral daily  diltiazem    milliGRAM(s) Oral daily  folic acid 1 milliGRAM(s) Oral daily  melatonin 5 milliGRAM(s) Oral at bedtime  metoprolol tartrate 100 milliGRAM(s) Oral two times a day  multivitamin 1 Tablet(s) Oral daily  nystatin Powder 1 Application(s) Topical every 12 hours  rivaroxaban 20 milliGRAM(s) Oral with dinner    MEDICATIONS  (PRN):  acetaminophen   Tablet .. 650 milliGRAM(s) Oral every 6 hours PRN Temp greater or equal to 38C (100.4F)  acetaminophen   Tablet .. 650 milliGRAM(s) Oral every 6 hours PRN Mild Pain (1 - 3)  LORazepam   Injectable 1 milliGRAM(s) IV Push every 2 hours PRN CIWA-Ar score increase by 2 points and a total score of 7 or less  LORazepam   Injectable 1 milliGRAM(s) IV Push every 1 hour PRN CIWA-Ar score 8 or greater  OLANZapine Injectable 2.5 milliGRAM(s) IntraMuscular every 6 hours PRN agitation      ALLERGIES: No Known Allergies      FAMILY HISTORY:  Family history of cerebrovascular accident (CVA) (Mother)    Family history of colon cancer (Father)    Family history of hemochromatosis (Sibling)        Social history:  Alochol:   Smoking:   Drug Use:   Marital Status:     PHYSICAL EXAMINATION:  -----------------------------  T(C): 37.8 (07-04-21 @ 04:55), Max: 37.8 (07-04-21 @ 04:55)  HR: 106 (07-04-21 @ 04:55) (71 - 106)  BP: 115/77 (07-04-21 @ 04:55) (103/64 - 131/80)  RR: 18 (07-04-21 @ 04:55) (18 - 18)  SpO2: 94% (07-04-21 @ 04:55) (92% - 97%)  Wt(kg): --    07-03 @ 07:01  -  07-04 @ 07:00  --------------------------------------------------------  IN:    Oral Fluid: 300 mL  Total IN: 300 mL    OUT:    Voided (mL): 3300 mL  Total OUT: 3300 mL    Total NET: -3000 mL            Constitutional: well developed, normal appearance, well groomed, well nourished, no deformities and no acute distress.   Eyes: the conjunctiva exhibited no abnormalities and the eyelids demonstrated no xanthelasmas.   HEENT: normal oral mucosa, no oral pallor and no oral cyanosis.   Neck: normal jugular venous A waves present, normal jugular venous V waves present and no jugular venous smith A waves.   Pulmonary: no respiratory distress, normal respiratory rhythm and effort, no accessory muscle use and lungs were clear to auscultation bilaterally. Anteriorly  Cardiovascular: heart rate and rhythm were irreg/irreg normal S1 and S2 and no murmur, gallop, rub, heave or thrill are present.    Musculoskeletal: the gait could not be assessed.   Extremities: B/L tense with stasis  Psychiatric: oriented to person, place, and time, the affect was normal, the mood was normal and not feeling anxious.     LABS:   --------  07-04    142  |  109<H>  |  17  ----------------------------<  105<H>  3.5   |  25  |  0.64    Ca    9.3      04 Jul 2021 08:00  Mg     1.8     07-04                           11.2   12.06 )-----------( 354      ( 04 Jul 2021 08:00 )             33.7                   Radiology:

## 2021-07-05 DIAGNOSIS — R50.9 FEVER, UNSPECIFIED: ICD-10-CM

## 2021-07-05 LAB
ANION GAP SERPL CALC-SCNC: 8 MMOL/L — SIGNIFICANT CHANGE UP (ref 5–17)
ANISOCYTOSIS BLD QL: SLIGHT — SIGNIFICANT CHANGE UP
AUTO DIFF PNL BLD: NEGATIVE — SIGNIFICANT CHANGE UP
BASOPHILS # BLD AUTO: 0 K/UL — SIGNIFICANT CHANGE UP (ref 0–0.2)
BASOPHILS NFR BLD AUTO: 0 % — SIGNIFICANT CHANGE UP (ref 0–2)
BUN SERPL-MCNC: 16 MG/DL — SIGNIFICANT CHANGE UP (ref 7–23)
C-ANCA SER-ACNC: NEGATIVE — SIGNIFICANT CHANGE UP
CALCIUM SERPL-MCNC: 9 MG/DL — SIGNIFICANT CHANGE UP (ref 8.5–10.1)
CHLORIDE SERPL-SCNC: 110 MMOL/L — HIGH (ref 96–108)
CO2 SERPL-SCNC: 26 MMOL/L — SIGNIFICANT CHANGE UP (ref 22–31)
CREAT SERPL-MCNC: 0.57 MG/DL — SIGNIFICANT CHANGE UP (ref 0.5–1.3)
EOSINOPHIL # BLD AUTO: 0 K/UL — SIGNIFICANT CHANGE UP (ref 0–0.5)
EOSINOPHIL NFR BLD AUTO: 0 % — SIGNIFICANT CHANGE UP (ref 0–6)
GLUCOSE SERPL-MCNC: 113 MG/DL — HIGH (ref 70–99)
HCT VFR BLD CALC: 33.9 % — LOW (ref 34.5–45)
HGB BLD-MCNC: 10.9 G/DL — LOW (ref 11.5–15.5)
LYMPHOCYTES # BLD AUTO: 19 % — SIGNIFICANT CHANGE UP (ref 13–44)
LYMPHOCYTES # BLD AUTO: 2.43 K/UL — SIGNIFICANT CHANGE UP (ref 1–3.3)
MACROCYTES BLD QL: SLIGHT — SIGNIFICANT CHANGE UP
MAGNESIUM SERPL-MCNC: 1.8 MG/DL — SIGNIFICANT CHANGE UP (ref 1.6–2.6)
MANUAL SMEAR VERIFICATION: YES — SIGNIFICANT CHANGE UP
MCHC RBC-ENTMCNC: 32.2 GM/DL — SIGNIFICANT CHANGE UP (ref 32–36)
MCHC RBC-ENTMCNC: 32.7 PG — SIGNIFICANT CHANGE UP (ref 27–34)
MCV RBC AUTO: 101.8 FL — HIGH (ref 80–100)
MONOCYTES # BLD AUTO: 0.9 K/UL — SIGNIFICANT CHANGE UP (ref 0–0.9)
MONOCYTES NFR BLD AUTO: 7 % — SIGNIFICANT CHANGE UP (ref 2–14)
NEUTROPHILS # BLD AUTO: 9.46 K/UL — HIGH (ref 1.8–7.4)
NEUTROPHILS NFR BLD AUTO: 72 % — SIGNIFICANT CHANGE UP (ref 43–77)
NEUTS BAND # BLD: 2 % — SIGNIFICANT CHANGE UP (ref 0–8)
NRBC # BLD: 0 — SIGNIFICANT CHANGE UP
NRBC # BLD: SIGNIFICANT CHANGE UP /100 WBCS (ref 0–0)
P-ANCA SER-ACNC: NEGATIVE — SIGNIFICANT CHANGE UP
PLAT MORPH BLD: NORMAL — SIGNIFICANT CHANGE UP
PLATELET # BLD AUTO: 400 K/UL — SIGNIFICANT CHANGE UP (ref 150–400)
POTASSIUM SERPL-MCNC: 3.6 MMOL/L — SIGNIFICANT CHANGE UP (ref 3.5–5.3)
POTASSIUM SERPL-SCNC: 3.6 MMOL/L — SIGNIFICANT CHANGE UP (ref 3.5–5.3)
PROCALCITONIN SERPL-MCNC: 0.26 NG/ML — HIGH (ref 0–0.04)
RBC # BLD: 3.33 M/UL — LOW (ref 3.8–5.2)
RBC # FLD: 15.4 % — HIGH (ref 10.3–14.5)
RBC BLD AUTO: SIGNIFICANT CHANGE UP
SODIUM SERPL-SCNC: 144 MMOL/L — SIGNIFICANT CHANGE UP (ref 135–145)
WBC # BLD: 12.79 K/UL — HIGH (ref 3.8–10.5)
WBC # FLD AUTO: 12.79 K/UL — HIGH (ref 3.8–10.5)

## 2021-07-05 RX ORDER — CHOLECALCIFEROL (VITAMIN D3) 125 MCG
4000 CAPSULE ORAL DAILY
Refills: 0 | Status: DISCONTINUED | OUTPATIENT
Start: 2021-07-05 | End: 2021-07-08

## 2021-07-05 RX ADMIN — Medication 1 TABLET(S): at 11:25

## 2021-07-05 RX ADMIN — Medication 250 MICROGRAM(S): at 05:24

## 2021-07-05 RX ADMIN — Medication 100 MILLIGRAM(S): at 05:24

## 2021-07-05 RX ADMIN — Medication 650 MILLIGRAM(S): at 18:29

## 2021-07-05 RX ADMIN — Medication 5 MILLIGRAM(S): at 22:35

## 2021-07-05 RX ADMIN — RIVAROXABAN 20 MILLIGRAM(S): KIT at 18:27

## 2021-07-05 RX ADMIN — Medication 4000 UNIT(S): at 11:25

## 2021-07-05 RX ADMIN — Medication 1 MILLIGRAM(S): at 11:25

## 2021-07-05 RX ADMIN — NYSTATIN CREAM 1 APPLICATION(S): 100000 CREAM TOPICAL at 18:27

## 2021-07-05 RX ADMIN — Medication 1 APPLICATION(S): at 18:26

## 2021-07-05 RX ADMIN — Medication 360 MILLIGRAM(S): at 05:24

## 2021-07-05 RX ADMIN — Medication 1 APPLICATION(S): at 05:25

## 2021-07-05 RX ADMIN — Medication 100 MILLIGRAM(S): at 18:26

## 2021-07-05 RX ADMIN — NYSTATIN CREAM 1 APPLICATION(S): 100000 CREAM TOPICAL at 05:25

## 2021-07-05 RX ADMIN — Medication 650 MILLIGRAM(S): at 19:32

## 2021-07-05 NOTE — PROGRESS NOTE ADULT - PROBLEM SELECTOR PLAN 2
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

## 2021-07-05 NOTE — PROGRESS NOTE ADULT - SUBJECTIVE AND OBJECTIVE BOX
[facility], Division of Infectious Diseases  HASMUKH Buchanan Y. Patel, S. Shah  [phone]    Name: JOSAFAT CLEMENTE  Age: 71y  Gender: Female  MRN: [id number]    Interval History:  Patient seen and examined at bedside this morning  Febrile to 100.4F this AM  Notes reviewed    Antibiotics:      Medications:  acetaminophen   Tablet .. 650 milliGRAM(s) Oral every 6 hours PRN  acetaminophen   Tablet .. 650 milliGRAM(s) Oral every 6 hours PRN  ammonium lactate 12% Lotion 1 Application(s) Topical two times a day  cholecalciferol 4000 Unit(s) Oral daily  digoxin     Tablet 250 MICROGram(s) Oral daily  diltiazem    milliGRAM(s) Oral daily  folic acid 1 milliGRAM(s) Oral daily  LORazepam   Injectable 1 milliGRAM(s) IV Push every 2 hours PRN  LORazepam   Injectable 1 milliGRAM(s) IV Push every 1 hour PRN  melatonin 5 milliGRAM(s) Oral at bedtime  metoprolol tartrate 100 milliGRAM(s) Oral two times a day  multivitamin 1 Tablet(s) Oral daily  nystatin Powder 1 Application(s) Topical every 12 hours  OLANZapine Injectable 2.5 milliGRAM(s) IntraMuscular every 6 hours PRN  rivaroxaban 20 milliGRAM(s) Oral with dinner      Review of Systems:  A 10-point review of systems was obtained.     Pertinent positives and negatives--  Constitutional: No fevers. No Chills. No Rigors.   Cardiovascular: No chest pain. No palpitations.  Respiratory: No shortness of breath. No cough.  Gastrointestinal: No nausea or vomiting. No diarrhea or constipation.   Psychiatric: Pleasant. Appropriate affect.    Review of systems otherwise negative except as previously noted.    Allergies: No Known Allergies    For details regarding the patient's past medical history, social history, family history, and other miscellaneous elements, please refer the initial infectious diseases consultation and/or the admitting history and physical examination for this admission.    Objective:  Vitals:   T(C): 36.4 (07-05-21 @ 05:12), Max: 38 (07-04-21 @ 19:31)  HR: 80 (07-05-21 @ 05:12) (60 - 80)  BP: 117/62 (07-05-21 @ 05:12) (117/62 - 120/77)  RR: 19 (07-05-21 @ 05:12) (18 - 19)  SpO2: 100% (07-05-21 @ 05:12) (96% - 100%)    Physical Examination:  General: no acute distress  HEENT: NC/AT, EOMI, anicteric, no oral lesions  Neck: supple, no palpable LAD  Cardio: S1, S2 heard, RRR, no murmurs  Resp: breath sounds heard bilaterally, no rales, wheezes or rhonchi  Abd: soft, NT, ND, + bowel sounds  Neuro: no obvious focal deficits  Ext: no edema or cyanosis  Skin: warm, dry, no visible rash      Laboratory Studies:  CBC:                       10.9   12.79 )-----------( 400      ( 05 Jul 2021 05:57 )             33.9     CMP: 07-05    144  |  110<H>  |  16  ----------------------------<  113<H>  3.6   |  26  |  0.57    Ca    9.0      05 Jul 2021 05:57  Mg     1.8     07-05            Microbiology: reviewed    Culture - Urine (collected 06-29-21 @ 15:06)  Source: .Urine Clean Catch (Midstream)  Final Report (06-30-21 @ 12:56):    No growth    Culture - Blood (collected 06-29-21 @ 12:13)  Source: .Blood Blood  Final Report (07-04-21 @ 13:01):    No Growth Final    Culture - Blood (collected 06-29-21 @ 12:13)  Source: .Blood Blood  Final Report (07-04-21 @ 13:01):    No Growth Final    Culture - Urine (collected 06-25-21 @ 00:40)  Source: .Urine Clean Catch (Midstream)  Final Report (06-27-21 @ 09:14):    >100,000 CFU/ml Klebsiella pneumoniae    Normal Urogenital jignesh present  Organism: Klebsiella pneumoniae (06-27-21 @ 09:14)  Organism: Klebsiella pneumoniae (06-27-21 @ 09:14)      -  Amikacin: S <=16      -  Amoxicillin/Clavulanic Acid: S <=8/4      -  Ampicillin: R 16 These ampicillin results predict results for amoxicillin      -  Ampicillin/Sulbactam: S <=4/2 Enterobacter, Citrobacter, and Serratia may develop resistance during prolonged therapy (3-4 days)      -  Aztreonam: S <=4      -  Cefazolin: S <=2 (MIC_CL_COM_ENTERIC_CEFAZU) For uncomplicated UTI with K. pneumoniae, E. coli, or P. mirablis: MICHELLE <=16 is sensitive and MICHELLE >=32 is resistant. This also predicts results for oral agents cefaclor, cefdinir, cefpodoxime, cefprozil, cefuroxime axetil, cephalexin and locarbef for uncomplicated UTI. Note that some isolates may be susceptible to these agents while testing resistant to cefazolin.      -  Cefepime: S <=2      -  Cefoxitin: S <=8      -  Ceftriaxone: S <=1 Enterobacter, Citrobacter, and Serratia may develop resistance during prolonged therapy      -  Ciprofloxacin: S <=0.25      -  Ertapenem: S <=0.5      -  Gentamicin: S <=2      -  Imipenem: I 2      -  Levofloxacin: S <=0.5      -  Meropenem: S <=1      -  Nitrofurantoin: I 64 Should not be used to treat pyelonephritis      -  Piperacillin/Tazobactam: S <=8      -  Tigecycline: S <=2      -  Tobramycin: S <=2      -  Trimethoprim/Sulfamethoxazole: S <=0.5/9.5      Method Type: MICHELLE        Radiology: reviewed       [facility], Division of Infectious Diseases  HASMUKH Buchanan Y. Patel, S. Shah  [phone]    Name: JOSAFAT CLEMENTE  Age: 71y  Gender: Female  MRN: [id number]    Interval History:  Patient seen and examined at bedside this morning  Febrile to 100.4F this AM. No complaints  Notes reviewed    Antibiotics:      Medications:  acetaminophen   Tablet .. 650 milliGRAM(s) Oral every 6 hours PRN  acetaminophen   Tablet .. 650 milliGRAM(s) Oral every 6 hours PRN  ammonium lactate 12% Lotion 1 Application(s) Topical two times a day  cholecalciferol 4000 Unit(s) Oral daily  digoxin     Tablet 250 MICROGram(s) Oral daily  diltiazem    milliGRAM(s) Oral daily  folic acid 1 milliGRAM(s) Oral daily  LORazepam   Injectable 1 milliGRAM(s) IV Push every 2 hours PRN  LORazepam   Injectable 1 milliGRAM(s) IV Push every 1 hour PRN  melatonin 5 milliGRAM(s) Oral at bedtime  metoprolol tartrate 100 milliGRAM(s) Oral two times a day  multivitamin 1 Tablet(s) Oral daily  nystatin Powder 1 Application(s) Topical every 12 hours  OLANZapine Injectable 2.5 milliGRAM(s) IntraMuscular every 6 hours PRN  rivaroxaban 20 milliGRAM(s) Oral with dinner      Review of Systems:  A 10-point review of systems was obtained.     Pertinent positives and negatives--  Constitutional: No fevers. No Chills. No Rigors.   Cardiovascular: No chest pain. No palpitations.  Respiratory: No shortness of breath. No cough.  Gastrointestinal: No nausea or vomiting. No diarrhea or constipation.   Psychiatric: Pleasant. Appropriate affect.    Review of systems otherwise negative except as previously noted.    Allergies: No Known Allergies    For details regarding the patient's past medical history, social history, family history, and other miscellaneous elements, please refer the initial infectious diseases consultation and/or the admitting history and physical examination for this admission.    Objective:  Vitals:   T(C): 36.4 (07-05-21 @ 05:12), Max: 38 (07-04-21 @ 19:31)  HR: 80 (07-05-21 @ 05:12) (60 - 80)  BP: 117/62 (07-05-21 @ 05:12) (117/62 - 120/77)  RR: 19 (07-05-21 @ 05:12) (18 - 19)  SpO2: 100% (07-05-21 @ 05:12) (96% - 100%)    Physical Examination:  General: no acute distress  HEENT: NC/AT, EOMI, anicteric, no oral lesions  Neck: supple, no palpable LAD  Cardio: S1, S2 heard, RRR, no murmurs  Resp: breath sounds heard bilaterally, no rales, wheezes or rhonchi  Abd: soft, NT, ND, + bowel sounds  Neuro: no obvious focal deficits  Ext: no edema or cyanosis  Skin: warm, dry, no visible rash      Laboratory Studies:  CBC:                       10.9   12.79 )-----------( 400      ( 05 Jul 2021 05:57 )             33.9     CMP: 07-05    144  |  110<H>  |  16  ----------------------------<  113<H>  3.6   |  26  |  0.57    Ca    9.0      05 Jul 2021 05:57  Mg     1.8     07-05            Microbiology: reviewed    Culture - Urine (collected 06-29-21 @ 15:06)  Source: .Urine Clean Catch (Midstream)  Final Report (06-30-21 @ 12:56):    No growth    Culture - Blood (collected 06-29-21 @ 12:13)  Source: .Blood Blood  Final Report (07-04-21 @ 13:01):    No Growth Final    Culture - Blood (collected 06-29-21 @ 12:13)  Source: .Blood Blood  Final Report (07-04-21 @ 13:01):    No Growth Final    Culture - Urine (collected 06-25-21 @ 00:40)  Source: .Urine Clean Catch (Midstream)  Final Report (06-27-21 @ 09:14):    >100,000 CFU/ml Klebsiella pneumoniae    Normal Urogenital jignesh present  Organism: Klebsiella pneumoniae (06-27-21 @ 09:14)  Organism: Klebsiella pneumoniae (06-27-21 @ 09:14)      -  Amikacin: S <=16      -  Amoxicillin/Clavulanic Acid: S <=8/4      -  Ampicillin: R 16 These ampicillin results predict results for amoxicillin      -  Ampicillin/Sulbactam: S <=4/2 Enterobacter, Citrobacter, and Serratia may develop resistance during prolonged therapy (3-4 days)      -  Aztreonam: S <=4      -  Cefazolin: S <=2 (MIC_CL_COM_ENTERIC_CEFAZU) For uncomplicated UTI with K. pneumoniae, E. coli, or P. mirablis: MICHELLE <=16 is sensitive and MICHELLE >=32 is resistant. This also predicts results for oral agents cefaclor, cefdinir, cefpodoxime, cefprozil, cefuroxime axetil, cephalexin and locarbef for uncomplicated UTI. Note that some isolates may be susceptible to these agents while testing resistant to cefazolin.      -  Cefepime: S <=2      -  Cefoxitin: S <=8      -  Ceftriaxone: S <=1 Enterobacter, Citrobacter, and Serratia may develop resistance during prolonged therapy      -  Ciprofloxacin: S <=0.25      -  Ertapenem: S <=0.5      -  Gentamicin: S <=2      -  Imipenem: I 2      -  Levofloxacin: S <=0.5      -  Meropenem: S <=1      -  Nitrofurantoin: I 64 Should not be used to treat pyelonephritis      -  Piperacillin/Tazobactam: S <=8      -  Tigecycline: S <=2      -  Tobramycin: S <=2      -  Trimethoprim/Sulfamethoxazole: S <=0.5/9.5      Method Type: MICHELLE        Radiology: reviewed

## 2021-07-05 NOTE — PROGRESS NOTE ADULT - PROBLEM SELECTOR PLAN 3
Infectious w/u was negative  Off abx since 7/2 and patient is having low grade temps  Neuro suggesting that parathyroid dysfunction may be causing thermoregulation problems  ID f/u

## 2021-07-05 NOTE — PROGRESS NOTE ADULT - SUBJECTIVE AND OBJECTIVE BOX
Neurology follow up note    JOSAFAT [id number]      Interval History:    Patient feels ok no new complaints.    MEDICATIONS    acetaminophen   Tablet .. 650 milliGRAM(s) Oral every 6 hours PRN  acetaminophen   Tablet .. 650 milliGRAM(s) Oral every 6 hours PRN  ammonium lactate 12% Lotion 1 Application(s) Topical two times a day  cholecalciferol 4000 Unit(s) Oral daily  digoxin     Tablet 250 MICROGram(s) Oral daily  diltiazem    milliGRAM(s) Oral daily  folic acid 1 milliGRAM(s) Oral daily  LORazepam   Injectable 1 milliGRAM(s) IV Push every 2 hours PRN  LORazepam   Injectable 1 milliGRAM(s) IV Push every 1 hour PRN  melatonin 5 milliGRAM(s) Oral at bedtime  metoprolol tartrate 100 milliGRAM(s) Oral two times a day  multivitamin 1 Tablet(s) Oral daily  nystatin Powder 1 Application(s) Topical every 12 hours  OLANZapine Injectable 2.5 milliGRAM(s) IntraMuscular every 6 hours PRN  rivaroxaban 20 milliGRAM(s) Oral with dinner      Allergies    No Known Allergies    Intolerances            Vital Signs Last 24 Hrs  T(C): 36.4 (05 Jul 2021 05:12), Max: 38 (04 Jul 2021 19:31)  T(F): 97.6 (05 Jul 2021 05:12), Max: 100.4 (04 Jul 2021 19:31)  HR: 80 (05 Jul 2021 05:12) (60 - 80)  BP: 117/62 (05 Jul 2021 05:12) (110/72 - 120/77)  BP(mean): --  RR: 19 (05 Jul 2021 05:12) (18 - 19)  SpO2: 100% (05 Jul 2021 05:12) (96% - 100%)    REVIEW OF SYSTEMS:  Constitutional:  The patient denies fever, chills, or night sweats.  Head:  At present, no headaches or lightheadedness.  Eyes:  No double vision, but does have blurry vision, suspect she thinks she has macular degeneration.  Ears:  No ringing in ears.  Neck:  No neck pain.  Respiratory:  No shortness of breath.  Cardiovascular:  No chest pain.  Abdomen:  No nausea, vomiting, or abdominal pain.  Extremities/Neurological:  Has numbness and tingling of her feet, but states she has herniated disc in her back.  This is not new.  Musculoskeletal:  Occasional joint pain, possibly from arthritis.    PHYSICAL EXAMINATION:  HEENT:  Head:  Positive trauma was noted over the left eye.  No scleral icterus was noted.  Ears:  Hearing bilaterally intact.  NECK:  Supple.  RESPIRATORY:  Good air entry bilaterally.  CARDIOVASCULAR:  S1 and S2 heard.  ABDOMEN:  Soft and nontender.  EXTREMITIES:  No clubbing was noted.      NEUROLOGIC:  The patient is awake   Location was hospital, year was 2021,  She was able to follow simple  but not complex commands  Extraocular movements were intact, full visual field.    Speech was fluent at times mumbles .  Smile was symmetric.  Motor:  Bilateral upper were 4/5, bilateral lower were 3-/5.   able to name objects                   LABS:  CBC Full  -  ( 05 Jul 2021 05:57 )  WBC Count : 12.79 K/uL  RBC Count : 3.33 M/uL  Hemoglobin : 10.9 g/dL  Hematocrit : 33.9 %  Platelet Count - Automated : 400 K/uL  Mean Cell Volume : 101.8 fl  Mean Cell Hemoglobin : 32.7 pg  Mean Cell Hemoglobin Concentration : 32.2 gm/dL  Auto Neutrophil # : 9.46 K/uL  Auto Lymphocyte # : 2.43 K/uL  Auto Monocyte # : 0.90 K/uL  Auto Eosinophil # : 0.00 K/uL  Auto Basophil # : 0.00 K/uL  Auto Neutrophil % : 72.0 %  Auto Lymphocyte % : 19.0 %  Auto Monocyte % : 7.0 %  Auto Eosinophil % : 0.0 %  Auto Basophil % : 0.0 %      07-05    144  |  110<H>  |  16  ----------------------------<  113<H>  3.6   |  26  |  0.57    Ca    9.0      05 Jul 2021 05:57  Mg     1.8     07-05      Hemoglobin A1C:       Vitamin B12         RADIOLOGY      ANALYSIS AND PLAN:  This is a 71-year-old with an episode of altered mental status.  Suspect patients AMS is possible from the history of hemochromatosis with oxidative stress and iron leading to a chronic neurodegenerative deterioration in overall cognitive status which unfortunately will be progressive. Questionable, the patient has an worsening mild cognitive impairment that is progressively becoming worse with hospital setting leading to the hospital delirium, possible metabolic encephalopathy.  antibiotics as needed   For history of atrial fibrillation, anticoagulation as needed.  For history of hypertension, monitor systolic blood pressure.  For history of hyperlipidemia, condition the patient on statin.  Fall precautions.  monitor oxygen and CO2 levels as needed  recommend Psychiatry follow-up in regards to control of symptoms.   MRI no clear changes   patient  H/O of encephalitis  was not infection rather autoimmune  possible H/O of ETOH use   will hold AC for now will see how patient mental status   for fevers questionable parathyroid dysfunctions leading to a thermoregulation problem with temperatures   more interactive today 7/5/21    Spoke with the healthcare proxy, Tamiko, at [phone] 7/3/21. she understands that she will deteriorate cognitive wise as time goes on will most likely need placement       Greater than 34 minutes of time was spent with the patient, plan of care, reviewing data, with greater than 50% of the visit was spent counseling and/or coordinating care with multidisciplinary healthcare team

## 2021-07-05 NOTE — PROGRESS NOTE ADULT - SUBJECTIVE AND OBJECTIVE BOX
Patient is a 71y old  Female who presents with a chief complaint of Dizzy (05 Jul 2021 05:55)        INTERVAL HPI/OVERNIGHT EVENTS:    MEDICATIONS  (STANDING):  ammonium lactate 12% Lotion 1 Application(s) Topical two times a day  digoxin     Tablet 250 MICROGram(s) Oral daily  diltiazem    milliGRAM(s) Oral daily  folic acid 1 milliGRAM(s) Oral daily  melatonin 5 milliGRAM(s) Oral at bedtime  metoprolol tartrate 100 milliGRAM(s) Oral two times a day  multivitamin 1 Tablet(s) Oral daily  nystatin Powder 1 Application(s) Topical every 12 hours  rivaroxaban 20 milliGRAM(s) Oral with dinner    MEDICATIONS  (PRN):  acetaminophen   Tablet .. 650 milliGRAM(s) Oral every 6 hours PRN Temp greater or equal to 38C (100.4F)  acetaminophen   Tablet .. 650 milliGRAM(s) Oral every 6 hours PRN Mild Pain (1 - 3)  LORazepam   Injectable 1 milliGRAM(s) IV Push every 2 hours PRN CIWA-Ar score increase by 2 points and a total score of 7 or less  LORazepam   Injectable 1 milliGRAM(s) IV Push every 1 hour PRN CIWA-Ar score 8 or greater  OLANZapine Injectable 2.5 milliGRAM(s) IntraMuscular every 6 hours PRN agitation      Allergies    No Known Allergies    Intolerances          Vital Signs Last 24 Hrs  T(C): 36.4 (05 Jul 2021 05:12), Max: 38 (04 Jul 2021 19:31)  T(F): 97.6 (05 Jul 2021 05:12), Max: 100.4 (04 Jul 2021 19:31)  HR: 80 (05 Jul 2021 05:12) (60 - 80)  BP: 117/62 (05 Jul 2021 05:12) (110/72 - 120/77)  BP(mean): --  RR: 19 (05 Jul 2021 05:12) (18 - 19)  SpO2: 100% (05 Jul 2021 05:12) (96% - 100%)    General: WN/WD NAD  Respiratory: CTA B/L  CV: RRR, S1S2, no murmurs, rubs or gallops  Abdominal: Soft, NT, ND +BS, Last BM  Extremities: No edema, + peripheral pulses    LABS:                        10.9   12.79 )-----------( 400      ( 05 Jul 2021 05:57 )             33.9     07-05    144  |  110<H>  |  16  ----------------------------<  113<H>  3.6   |  26  |  0.57    Ca    9.0      05 Jul 2021 05:57  Mg     1.8     07-05      CAPILLARY BLOOD GLUCOSE              RADIOLOGY & ADDITIONAL TESTS:

## 2021-07-05 NOTE — PROGRESS NOTE ADULT - SUBJECTIVE AND OBJECTIVE BOX
Chief Complaint: Tachycardia    Interval Events: Intermittently febrile.    Review of Systems:  General: No fevers, chills, weight loss or gain  Skin: No rashes, color changes  Cardiovascular: No chest pain, orthopnea  Respiratory: No shortness of breath, cough  Gastrointestinal: No nausea, abdominal pain  Genitourinary: No incontinence, pain with urination  Musculoskeletal: No pain, swelling, decreased range of motion  Neurological: No headache, weakness  Psychiatric: No depression, anxiety  Endocrine: No weight loss or gain, increased thirst  All other systems are comprehensively negative.    Physical Exam:  Vital Signs Last 24 Hrs  T(C): 36.4 (05 Jul 2021 05:12), Max: 38 (04 Jul 2021 19:31)  T(F): 97.6 (05 Jul 2021 05:12), Max: 100.4 (04 Jul 2021 19:31)  HR: 80 (05 Jul 2021 05:12) (60 - 80)  BP: 117/62 (05 Jul 2021 05:12) (110/72 - 120/77)  BP(mean): --  RR: 19 (05 Jul 2021 05:12) (18 - 19)  SpO2: 100% (05 Jul 2021 05:12) (96% - 100%)  General: Confused  HEENT: MMM  Neck: No JVD, no carotid bruit  Lungs: CTAB  CV: Irregular, nl S1/S2, no M/R/G  Abdomen: S/NT/ND, +BS  Extremities: No LE edema, no cyanosis  Neuro: AAOx1, non-focal  Skin: No rash    Labs:    07-05    144  |  110<H>  |  16  ----------------------------<  113<H>  3.6   |  26  |  0.57    Ca    9.0      05 Jul 2021 05:57  Mg     1.8     07-05                          10.9   12.79 )-----------( 400      ( 05 Jul 2021 05:57 )             33.9       Telemetry: AF 60-80

## 2021-07-05 NOTE — PROGRESS NOTE ADULT - SUBJECTIVE AND OBJECTIVE BOX
Patient is a 71y old  Female who presents with a chief complaint of Dizzy (05 Jul 2021 09:38)      INTERVAL HPI/OVERNIGHT EVENTS: Patient seen and examined. NAD. No complaints.    Vital Signs Last 24 Hrs  T(C): 36.4 (05 Jul 2021 05:12), Max: 38 (04 Jul 2021 19:31)  T(F): 97.6 (05 Jul 2021 05:12), Max: 100.4 (04 Jul 2021 19:31)  HR: 80 (05 Jul 2021 05:12) (60 - 80)  BP: 117/62 (05 Jul 2021 05:12) (110/72 - 120/77)  BP(mean): --  RR: 19 (05 Jul 2021 05:12) (18 - 19)  SpO2: 100% (05 Jul 2021 05:12) (96% - 100%)    07-05    144  |  110<H>  |  16  ----------------------------<  113<H>  3.6   |  26  |  0.57    Ca    9.0      05 Jul 2021 05:57  Mg     1.8     07-05                            10.9   12.79 )-----------( 400      ( 05 Jul 2021 05:57 )             33.9       CAPILLARY BLOOD GLUCOSE                  acetaminophen   Tablet .. 650 milliGRAM(s) Oral every 6 hours PRN  acetaminophen   Tablet .. 650 milliGRAM(s) Oral every 6 hours PRN  ammonium lactate 12% Lotion 1 Application(s) Topical two times a day  cholecalciferol 4000 Unit(s) Oral daily  digoxin     Tablet 250 MICROGram(s) Oral daily  diltiazem    milliGRAM(s) Oral daily  folic acid 1 milliGRAM(s) Oral daily  LORazepam   Injectable 1 milliGRAM(s) IV Push every 2 hours PRN  LORazepam   Injectable 1 milliGRAM(s) IV Push every 1 hour PRN  melatonin 5 milliGRAM(s) Oral at bedtime  metoprolol tartrate 100 milliGRAM(s) Oral two times a day  multivitamin 1 Tablet(s) Oral daily  nystatin Powder 1 Application(s) Topical every 12 hours  OLANZapine Injectable 2.5 milliGRAM(s) IntraMuscular every 6 hours PRN  rivaroxaban 20 milliGRAM(s) Oral with dinner              REVIEW OF SYSTEMS:  CONSTITUTIONAL: + fever, no weight loss, or no fatigue  NECK: No pain, no stiffness  RESPIRATORY: No cough, no wheezing, no chills, no hemoptysis, No shortness of breath  CARDIOVASCULAR: No chest pain, no palpitations, no dizziness, no leg swelling  GASTROINTESTINAL: No abdominal pain. No nausea, no vomiting, no hematemesis; No diarrhea, no constipation. No melena, no hematochezia.  GENITOURINARY: No dysuria, no frequency, no hematuria, no incontinence  NEUROLOGICAL: No headaches, no loss of strength, no numbness, no tremors  SKIN: No itching, no burning  MUSCULOSKELETAL: No joint pain, no swelling; No muscle, no back, no extremity pain  PSYCHIATRIC: No depression, no mood swings,   HEME/LYMPH: No easy bruising, no bleeding gums  ALLERY AND IMMUNOLOGIC: No hives       Consultant(s) Notes Reviewed:  [X] YES  [ ] NO    PHYSICAL EXAM:  GENERAL: NAD  HEAD:  Atraumatic, Normocephalic  EYES: EOMI, PERRLA, conjunctiva and sclera clear  ENMT: No tonsillar erythema, exudates, or enlargement; Moist mucous membranes  NECK: Supple, No JVD  NERVOUS SYSTEM:  Awake & alert  CHEST/LUNG: Clear to auscultation bilaterally; No rales, rhonchi, wheezing,  HEART: Regular rate and rhythm  ABDOMEN: Soft, Nontender, Nondistended; Bowel sounds present  EXTREMITIES:  No clubbing, cyanosis, or edema  LYMPH: No lymphadenopathy noted  SKIN: No rashes      Advanced care planning discussed with patient/family [X] YES   [ ] NO    Advanced care planning discussed with patient/family. Patient's health status was discussed. All appropriate changes have been made regarding patient's end-of-life care. Advanced care planning forms reviewed/discussed/completed.  20 minutes spent.

## 2021-07-06 LAB
ANION GAP SERPL CALC-SCNC: 7 MMOL/L — SIGNIFICANT CHANGE UP (ref 5–17)
BUN SERPL-MCNC: 15 MG/DL — SIGNIFICANT CHANGE UP (ref 7–23)
CALCIUM SERPL-MCNC: 9.5 MG/DL — SIGNIFICANT CHANGE UP (ref 8.5–10.1)
CHLORIDE SERPL-SCNC: 109 MMOL/L — HIGH (ref 96–108)
CO2 SERPL-SCNC: 26 MMOL/L — SIGNIFICANT CHANGE UP (ref 22–31)
CREAT SERPL-MCNC: 0.58 MG/DL — SIGNIFICANT CHANGE UP (ref 0.5–1.3)
GLUCOSE SERPL-MCNC: 112 MG/DL — HIGH (ref 70–99)
HCT VFR BLD CALC: 34 % — LOW (ref 34.5–45)
HGB BLD-MCNC: 10.9 G/DL — LOW (ref 11.5–15.5)
MAGNESIUM SERPL-MCNC: 1.9 MG/DL — SIGNIFICANT CHANGE UP (ref 1.6–2.6)
MCHC RBC-ENTMCNC: 32.1 GM/DL — SIGNIFICANT CHANGE UP (ref 32–36)
MCHC RBC-ENTMCNC: 32.7 PG — SIGNIFICANT CHANGE UP (ref 27–34)
MCV RBC AUTO: 102.1 FL — HIGH (ref 80–100)
NRBC # BLD: 0 /100 WBCS — SIGNIFICANT CHANGE UP (ref 0–0)
PLATELET # BLD AUTO: 501 K/UL — HIGH (ref 150–400)
POTASSIUM SERPL-MCNC: 3.6 MMOL/L — SIGNIFICANT CHANGE UP (ref 3.5–5.3)
POTASSIUM SERPL-SCNC: 3.6 MMOL/L — SIGNIFICANT CHANGE UP (ref 3.5–5.3)
RBC # BLD: 3.33 M/UL — LOW (ref 3.8–5.2)
RBC # FLD: 15.7 % — HIGH (ref 10.3–14.5)
SODIUM SERPL-SCNC: 142 MMOL/L — SIGNIFICANT CHANGE UP (ref 135–145)
WBC # BLD: 12.07 K/UL — HIGH (ref 3.8–10.5)
WBC # FLD AUTO: 12.07 K/UL — HIGH (ref 3.8–10.5)

## 2021-07-06 RX ADMIN — NYSTATIN CREAM 1 APPLICATION(S): 100000 CREAM TOPICAL at 17:31

## 2021-07-06 RX ADMIN — Medication 250 MICROGRAM(S): at 05:36

## 2021-07-06 RX ADMIN — Medication 100 MILLIGRAM(S): at 17:29

## 2021-07-06 RX ADMIN — Medication 1 MILLIGRAM(S): at 12:04

## 2021-07-06 RX ADMIN — Medication 100 MILLIGRAM(S): at 05:36

## 2021-07-06 RX ADMIN — Medication 1 TABLET(S): at 12:04

## 2021-07-06 RX ADMIN — NYSTATIN CREAM 1 APPLICATION(S): 100000 CREAM TOPICAL at 05:37

## 2021-07-06 RX ADMIN — Medication 5 MILLIGRAM(S): at 22:43

## 2021-07-06 RX ADMIN — Medication 650 MILLIGRAM(S): at 05:36

## 2021-07-06 RX ADMIN — Medication 1 APPLICATION(S): at 05:37

## 2021-07-06 RX ADMIN — Medication 650 MILLIGRAM(S): at 06:33

## 2021-07-06 RX ADMIN — Medication 360 MILLIGRAM(S): at 05:36

## 2021-07-06 RX ADMIN — RIVAROXABAN 20 MILLIGRAM(S): KIT at 17:29

## 2021-07-06 RX ADMIN — Medication 1 APPLICATION(S): at 17:31

## 2021-07-06 RX ADMIN — Medication 4000 UNIT(S): at 12:04

## 2021-07-06 NOTE — PROGRESS NOTE ADULT - SUBJECTIVE AND OBJECTIVE BOX
Date/Time Patient Seen:  		  Referring MD:   Data Reviewed	       Patient is a 71y old  Female who presents with a chief complaint of Dizzy (05 Jul 2021 11:58)      Subjective/HPI     PAST MEDICAL & SURGICAL HISTORY:  HTN (hypertension), benign    CHF (congestive heart failure)    Pulmonary embolism    HLD (hyperlipidemia)    HTN (hypertension)    Hemochromatosis    S/P D&amp;C (status post dilation and curettage)  with IUD insertion in 2013    No significant past surgical history          Medication list         MEDICATIONS  (STANDING):  ammonium lactate 12% Lotion 1 Application(s) Topical two times a day  cholecalciferol 4000 Unit(s) Oral daily  digoxin     Tablet 250 MICROGram(s) Oral daily  diltiazem    milliGRAM(s) Oral daily  folic acid 1 milliGRAM(s) Oral daily  melatonin 5 milliGRAM(s) Oral at bedtime  metoprolol tartrate 100 milliGRAM(s) Oral two times a day  multivitamin 1 Tablet(s) Oral daily  nystatin Powder 1 Application(s) Topical every 12 hours  rivaroxaban 20 milliGRAM(s) Oral with dinner    MEDICATIONS  (PRN):  acetaminophen   Tablet .. 650 milliGRAM(s) Oral every 6 hours PRN Temp greater or equal to 38C (100.4F)  acetaminophen   Tablet .. 650 milliGRAM(s) Oral every 6 hours PRN Mild Pain (1 - 3)  LORazepam   Injectable 1 milliGRAM(s) IV Push every 2 hours PRN CIWA-Ar score increase by 2 points and a total score of 7 or less  LORazepam   Injectable 1 milliGRAM(s) IV Push every 1 hour PRN CIWA-Ar score 8 or greater  OLANZapine Injectable 2.5 milliGRAM(s) IntraMuscular every 6 hours PRN agitation         Vitals log        ICU Vital Signs Last 24 Hrs  T(C): 37.5 (06 Jul 2021 04:32), Max: 37.5 (06 Jul 2021 04:32)  T(F): 99.5 (06 Jul 2021 04:32), Max: 99.5 (06 Jul 2021 04:32)  HR: 85 (06 Jul 2021 04:32) (67 - 85)  BP: 110/74 (06 Jul 2021 04:32) (109/68 - 114/72)  BP(mean): --  ABP: --  ABP(mean): --  RR: 18 (06 Jul 2021 04:32) (18 - 18)  SpO2: 95% (06 Jul 2021 04:32) (95% - 97%)           Input and Output:  I&O's Detail    04 Jul 2021 07:01  -  05 Jul 2021 07:00  --------------------------------------------------------  IN:    Oral Fluid: 670 mL  Total IN: 670 mL    OUT:    Voided (mL): 1550 mL  Total OUT: 1550 mL    Total NET: -880 mL      05 Jul 2021 07:01  -  06 Jul 2021 05:53  --------------------------------------------------------  IN:  Total IN: 0 mL    OUT:    Voided (mL): 620 mL  Total OUT: 620 mL    Total NET: -620 mL          Lab Data                        10.9   12.79 )-----------( 400      ( 05 Jul 2021 05:57 )             33.9     07-05    144  |  110<H>  |  16  ----------------------------<  113<H>  3.6   |  26  |  0.57    Ca    9.0      05 Jul 2021 05:57  Mg     1.8     07-05              Review of Systems	      Objective     Physical Examination    heart s1s2  lung dec BS  abd soft  head nc  obese      Pertinent Lab findings & Imaging      Ino:  NO   Adequate UO     I&O's Detail    04 Jul 2021 07:01  -  05 Jul 2021 07:00  --------------------------------------------------------  IN:    Oral Fluid: 670 mL  Total IN: 670 mL    OUT:    Voided (mL): 1550 mL  Total OUT: 1550 mL    Total NET: -880 mL      05 Jul 2021 07:01  -  06 Jul 2021 05:53  --------------------------------------------------------  IN:  Total IN: 0 mL    OUT:    Voided (mL): 620 mL  Total OUT: 620 mL    Total NET: -620 mL               Discussed with:     Cultures:	        Radiology

## 2021-07-06 NOTE — PROGRESS NOTE ADULT - SUBJECTIVE AND OBJECTIVE BOX
[facility], Division of Infectious Diseases  HASMUKH Buchanan Y. Patel, S. Shah  [phone]    Name: JOSAFAT CLEMENTE  Age: 71y  Gender: Female  MRN: [id number]    Interval History:  Patient seen and examined at bedside this morning  No acute overnight events. Afebrile this AM  Notes reviewed    Antibiotics:      Medications:  acetaminophen   Tablet .. 650 milliGRAM(s) Oral every 6 hours PRN  acetaminophen   Tablet .. 650 milliGRAM(s) Oral every 6 hours PRN  ammonium lactate 12% Lotion 1 Application(s) Topical two times a day  cholecalciferol 4000 Unit(s) Oral daily  digoxin     Tablet 250 MICROGram(s) Oral daily  diltiazem    milliGRAM(s) Oral daily  folic acid 1 milliGRAM(s) Oral daily  LORazepam   Injectable 1 milliGRAM(s) IV Push every 2 hours PRN  LORazepam   Injectable 1 milliGRAM(s) IV Push every 1 hour PRN  melatonin 5 milliGRAM(s) Oral at bedtime  metoprolol tartrate 100 milliGRAM(s) Oral two times a day  multivitamin 1 Tablet(s) Oral daily  nystatin Powder 1 Application(s) Topical every 12 hours  OLANZapine Injectable 2.5 milliGRAM(s) IntraMuscular every 6 hours PRN  rivaroxaban 20 milliGRAM(s) Oral with dinner      Review of Systems:  A 10-point review of systems was obtained.   Review of systems otherwise negative except as previously noted.    Allergies: No Known Allergies    For details regarding the patient's past medical history, social history, family history, and other miscellaneous elements, please refer the initial infectious diseases consultation and/or the admitting history and physical examination for this admission.    Objective:  Vitals:   T(C): 37.6 (07-06-21 @ 10:01), Max: 37.6 (07-06-21 @ 10:01)  HR: 70 (07-06-21 @ 10:01) (67 - 85)  BP: 105/66 (07-06-21 @ 10:01) (105/66 - 114/72)  RR: 18 (07-06-21 @ 10:01) (18 - 18)  SpO2: 95% (07-06-21 @ 10:01) (95% - 97%)    Physical Examination:  General: no acute distress  HEENT: NC/AT, EOMI, anicteric, no oral lesions  Neck: supple, no palpable LAD  Cardio: S1, S2 heard, RRR, no murmurs  Resp: breath sounds heard bilaterally, no rales, wheezes or rhonchi  Abd: soft, NT, ND, + bowel sounds  Neuro: no obvious focal deficits  Ext: no edema or cyanosis  Skin: warm, dry, no visible rash      Laboratory Studies:  CBC:                       10.9   12.07 )-----------( 501      ( 06 Jul 2021 06:52 )             34.0     CMP: 07-06    142  |  109<H>  |  15  ----------------------------<  112<H>  3.6   |  26  |  0.58    Ca    9.5      06 Jul 2021 06:52  Mg     1.9     07-06            Microbiology: reviewed    Culture - Urine (collected 06-29-21 @ 15:06)  Source: .Urine Clean Catch (Midstream)  Final Report (06-30-21 @ 12:56):    No growth    Culture - Blood (collected 06-29-21 @ 12:13)  Source: .Blood Blood  Final Report (07-04-21 @ 13:01):    No Growth Final    Culture - Blood (collected 06-29-21 @ 12:13)  Source: .Blood Blood  Final Report (07-04-21 @ 13:01):    No Growth Final    Culture - Urine (collected 06-25-21 @ 00:40)  Source: .Urine Clean Catch (Midstream)  Final Report (06-27-21 @ 09:14):    >100,000 CFU/ml Klebsiella pneumoniae    Normal Urogenital jignesh present  Organism: Klebsiella pneumoniae (06-27-21 @ 09:14)  Organism: Klebsiella pneumoniae (06-27-21 @ 09:14)      -  Amikacin: S <=16      -  Amoxicillin/Clavulanic Acid: S <=8/4      -  Ampicillin: R 16 These ampicillin results predict results for amoxicillin      -  Ampicillin/Sulbactam: S <=4/2 Enterobacter, Citrobacter, and Serratia may develop resistance during prolonged therapy (3-4 days)      -  Aztreonam: S <=4      -  Cefazolin: S <=2 (MIC_CL_COM_ENTERIC_CEFAZU) For uncomplicated UTI with K. pneumoniae, E. coli, or P. mirablis: MICHELLE <=16 is sensitive and MICHELLE >=32 is resistant. This also predicts results for oral agents cefaclor, cefdinir, cefpodoxime, cefprozil, cefuroxime axetil, cephalexin and locarbef for uncomplicated UTI. Note that some isolates may be susceptible to these agents while testing resistant to cefazolin.      -  Cefepime: S <=2      -  Cefoxitin: S <=8      -  Ceftriaxone: S <=1 Enterobacter, Citrobacter, and Serratia may develop resistance during prolonged therapy      -  Ciprofloxacin: S <=0.25      -  Ertapenem: S <=0.5      -  Gentamicin: S <=2      -  Imipenem: I 2      -  Levofloxacin: S <=0.5      -  Meropenem: S <=1      -  Nitrofurantoin: I 64 Should not be used to treat pyelonephritis      -  Piperacillin/Tazobactam: S <=8      -  Tigecycline: S <=2      -  Tobramycin: S <=2      -  Trimethoprim/Sulfamethoxazole: S <=0.5/9.5      Method Type: MICHELLE        Radiology: reviewed

## 2021-07-06 NOTE — PROGRESS NOTE ADULT - SUBJECTIVE AND OBJECTIVE BOX
Patient is a 71y old  Female who presents with a chief complaint of Dizzy (05 Jul 2021 09:38)      INTERVAL HPI/OVERNIGHT EVENTS: Patient seen and examined. NAD. No complaints.    Vital Signs Last 24 Hrs  T(C): 36.9 (07 Jul 2021 04:37), Max: 37 (06 Jul 2021 16:31)  T(F): 98.4 (07 Jul 2021 04:37), Max: 98.6 (06 Jul 2021 16:31)  HR: 83 (07 Jul 2021 04:37) (83 - 87)  BP: 123/68 (07 Jul 2021 04:37) (117/77 - 123/68)  BP(mean): --  RR: 18 (07 Jul 2021 04:37) (18 - 18)  SpO2: 96% (07 Jul 2021 04:37) (96% - 96%)    07-06    142  |  109<H>  |  15  ----------------------------<  112<H>  3.6   |  26  |  0.58    Ca    9.5      06 Jul 2021 06:52  Mg     1.9     07-06                            10.9   12.07 )-----------( 501      ( 06 Jul 2021 06:52 )             34.0       CAPILLARY BLOOD GLUCOSE                  acetaminophen   Tablet .. 650 milliGRAM(s) Oral every 6 hours PRN  acetaminophen   Tablet .. 650 milliGRAM(s) Oral every 6 hours PRN  ammonium lactate 12% Lotion 1 Application(s) Topical two times a day  cholecalciferol 4000 Unit(s) Oral daily  digoxin     Tablet 250 MICROGram(s) Oral daily  diltiazem    milliGRAM(s) Oral daily  folic acid 1 milliGRAM(s) Oral daily  LORazepam   Injectable 1 milliGRAM(s) IV Push every 2 hours PRN  LORazepam   Injectable 1 milliGRAM(s) IV Push every 1 hour PRN  melatonin 5 milliGRAM(s) Oral at bedtime  metoprolol tartrate 100 milliGRAM(s) Oral two times a day  multivitamin 1 Tablet(s) Oral daily  nystatin Powder 1 Application(s) Topical every 12 hours  OLANZapine Injectable 2.5 milliGRAM(s) IntraMuscular every 6 hours PRN  rivaroxaban 20 milliGRAM(s) Oral with dinner            REVIEW OF SYSTEMS:  CONSTITUTIONAL: + fever, no weight loss, or no fatigue  NECK: No pain, no stiffness  RESPIRATORY: No cough, no wheezing, no chills, no hemoptysis, No shortness of breath  CARDIOVASCULAR: No chest pain, no palpitations, no dizziness, no leg swelling  GASTROINTESTINAL: No abdominal pain. No nausea, no vomiting, no hematemesis; No diarrhea, no constipation. No melena, no hematochezia.  GENITOURINARY: No dysuria, no frequency, no hematuria, no incontinence  NEUROLOGICAL: No headaches, no loss of strength, no numbness, no tremors  SKIN: No itching, no burning  MUSCULOSKELETAL: No joint pain, no swelling; No muscle, no back, no extremity pain  PSYCHIATRIC: No depression, no mood swings,   HEME/LYMPH: No easy bruising, no bleeding gums  ALLERY AND IMMUNOLOGIC: No hives       Consultant(s) Notes Reviewed:  [X] YES  [ ] NO    PHYSICAL EXAM:  GENERAL: NAD  HEAD:  Atraumatic, Normocephalic  EYES: EOMI, PERRLA, conjunctiva and sclera clear  ENMT: No tonsillar erythema, exudates, or enlargement; Moist mucous membranes  NECK: Supple, No JVD  NERVOUS SYSTEM:  Awake & alert  CHEST/LUNG: Clear to auscultation bilaterally; No rales, rhonchi, wheezing,  HEART: Regular rate and rhythm  ABDOMEN: Soft, Nontender, Nondistended; Bowel sounds present  EXTREMITIES:  No clubbing, cyanosis, or edema  LYMPH: No lymphadenopathy noted  SKIN: No rashes      Advanced care planning discussed with patient/family [X] YES   [ ] NO    Advanced care planning discussed with patient/family. Patient's health status was discussed. All appropriate changes have been made regarding patient's end-of-life care. Advanced care planning forms reviewed/discussed/completed.  20 minutes spent.

## 2021-07-06 NOTE — PROGRESS NOTE ADULT - SUBJECTIVE AND OBJECTIVE BOX
Chief Complaint: Tachycardia    Interval Events: No events overnight.    Review of Systems:  General: No fevers, chills, weight loss or gain  Skin: No rashes, color changes  Cardiovascular: No chest pain, orthopnea  Respiratory: No shortness of breath, cough  Gastrointestinal: No nausea, abdominal pain  Genitourinary: No incontinence, pain with urination  Musculoskeletal: No pain, swelling, decreased range of motion  Neurological: No headache, weakness  Psychiatric: No depression, anxiety  Endocrine: No weight loss or gain, increased thirst  All other systems are comprehensively negative.    Physical Exam:  Vital Signs Last 24 Hrs  T(C): 37.5 (06 Jul 2021 04:32), Max: 37.5 (06 Jul 2021 04:32)  T(F): 99.5 (06 Jul 2021 04:32), Max: 99.5 (06 Jul 2021 04:32)  HR: 85 (06 Jul 2021 04:32) (67 - 85)  BP: 110/74 (06 Jul 2021 04:32) (109/68 - 114/72)  BP(mean): --  RR: 18 (06 Jul 2021 04:32) (18 - 18)  SpO2: 95% (06 Jul 2021 04:32) (95% - 97%)  General: NAD  HEENT: MMM  Neck: No JVD, no carotid bruit  Lungs: CTAB  CV: Irregular, nl S1/S2, no M/R/G  Abdomen: S/NT/ND, +BS  Extremities: No LE edema, no cyanosis  Neuro: AAOx3, non-focal  Skin: No rash    Labs:    07-06    142  |  109<H>  |  15  ----------------------------<  112<H>  3.6   |  26  |  0.58    Ca    9.5      06 Jul 2021 06:52  Mg     1.9     07-06                          10.9   12.07 )-----------( 501      ( 06 Jul 2021 06:52 )             34.0       Telemetry: AF 60-80

## 2021-07-06 NOTE — PROGRESS NOTE ADULT - SUBJECTIVE AND OBJECTIVE BOX
Neurology follow up note    JOSAFAT [id number]      Interval History:    Patient feels ok no new complaints.    MEDICATIONS    acetaminophen   Tablet .. 650 milliGRAM(s) Oral every 6 hours PRN  acetaminophen   Tablet .. 650 milliGRAM(s) Oral every 6 hours PRN  ammonium lactate 12% Lotion 1 Application(s) Topical two times a day  cholecalciferol 4000 Unit(s) Oral daily  digoxin     Tablet 250 MICROGram(s) Oral daily  diltiazem    milliGRAM(s) Oral daily  folic acid 1 milliGRAM(s) Oral daily  LORazepam   Injectable 1 milliGRAM(s) IV Push every 2 hours PRN  LORazepam   Injectable 1 milliGRAM(s) IV Push every 1 hour PRN  melatonin 5 milliGRAM(s) Oral at bedtime  metoprolol tartrate 100 milliGRAM(s) Oral two times a day  multivitamin 1 Tablet(s) Oral daily  nystatin Powder 1 Application(s) Topical every 12 hours  OLANZapine Injectable 2.5 milliGRAM(s) IntraMuscular every 6 hours PRN  rivaroxaban 20 milliGRAM(s) Oral with dinner      Allergies    No Known Allergies    Intolerances            Vital Signs Last 24 Hrs  T(C): 37.5 (06 Jul 2021 04:32), Max: 37.5 (06 Jul 2021 04:32)  T(F): 99.5 (06 Jul 2021 04:32), Max: 99.5 (06 Jul 2021 04:32)  HR: 85 (06 Jul 2021 04:32) (67 - 85)  BP: 110/74 (06 Jul 2021 04:32) (109/68 - 114/72)  BP(mean): --  RR: 18 (06 Jul 2021 04:32) (18 - 18)  SpO2: 95% (06 Jul 2021 04:32) (95% - 97%)      REVIEW OF SYSTEMS:  Constitutional:  The patient denies fever, chills, or night sweats.  Head:  At present, no headaches or lightheadedness.  Eyes:  No double vision, but does have blurry vision, suspect she thinks she has macular degeneration.  Ears:  No ringing in ears.  Neck:  No neck pain.  Respiratory:  No shortness of breath.  Cardiovascular:  No chest pain.  Abdomen:  No nausea, vomiting, or abdominal pain.  Extremities/Neurological:  Has numbness and tingling of her feet, but states she has herniated disc in her back.  This is not new.  Musculoskeletal:  Occasional joint pain, possibly from arthritis.    PHYSICAL EXAMINATION:  HEENT:  Head:  Positive trauma was noted over the left eye.  No scleral icterus was noted.  Ears:  Hearing bilaterally intact.  NECK:  Supple.  RESPIRATORY:  Good air entry bilaterally.  CARDIOVASCULAR:  S1 and S2 heard.  ABDOMEN:  Soft and nontender.  EXTREMITIES:  No clubbing was noted.      NEUROLOGIC:  The patient is awake   Location was hospital, year was 2021,  She was able to follow simple  and complex commands  Extraocular movements were intact, full visual field.    Speech was fluent.  Smile was symmetric.  Motor:  Bilateral upper were 4/5, bilateral lower were 3-/5.   able to name objects                   LABS:  CBC Full  -  ( 06 Jul 2021 06:52 )  WBC Count : 12.07 K/uL  RBC Count : 3.33 M/uL  Hemoglobin : 10.9 g/dL  Hematocrit : 34.0 %  Platelet Count - Automated : 501 K/uL  Mean Cell Volume : 102.1 fl  Mean Cell Hemoglobin : 32.7 pg  Mean Cell Hemoglobin Concentration : 32.1 gm/dL  Auto Neutrophil # : x  Auto Lymphocyte # : x  Auto Monocyte # : x  Auto Eosinophil # : x  Auto Basophil # : x  Auto Neutrophil % : x  Auto Lymphocyte % : x  Auto Monocyte % : x  Auto Eosinophil % : x  Auto Basophil % : x      07-06    142  |  109<H>  |  15  ----------------------------<  112<H>  3.6   |  26  |  0.58    Ca    9.5      06 Jul 2021 06:52  Mg     1.9     07-06      Hemoglobin A1C:       Vitamin B12         RADIOLOGY    ANALYSIS AND PLAN:  This is a 71-year-old with an episode of altered mental status.  Suspect patients AMS is possible from the history of hemochromatosis with oxidative stress and iron leading to a chronic neurodegenerative deterioration in overall cognitive status which unfortunately will be progressive. Questionable, the patient has an worsening mild cognitive impairment that is progressively becoming worse with hospital setting leading to the hospital delirium, possible metabolic encephalopathy.  antibiotics as needed   For history of atrial fibrillation, anticoagulation as needed.  For history of hypertension, monitor systolic blood pressure.  For history of hyperlipidemia, condition the patient on statin.  Fall precautions.  monitor oxygen and CO2 levels as needed  recommend Psychiatry follow-up in regards to control of symptoms.   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 she understands that she will deteriorate cognitive wise as time goes on will most likely need placement     Greater than 31  minutes of time was spent with the patient, plan of care, reviewing data, with greater than 50% of the visit was spent counseling and/or coordinating care with multidisciplinary healthcare team

## 2021-07-07 ENCOUNTER — TRANSCRIPTION ENCOUNTER (OUTPATIENT)
Age: 71
End: 2021-07-07

## 2021-07-07 LAB — SARS-COV-2 RNA SPEC QL NAA+PROBE: SIGNIFICANT CHANGE UP

## 2021-07-07 PROCEDURE — 99232 SBSQ HOSP IP/OBS MODERATE 35: CPT

## 2021-07-07 RX ORDER — DILTIAZEM HCL 120 MG
1 CAPSULE, EXT RELEASE 24 HR ORAL
Qty: 0 | Refills: 0 | DISCHARGE

## 2021-07-07 RX ORDER — FOLIC ACID 0.8 MG
1 TABLET ORAL
Qty: 0 | Refills: 0 | DISCHARGE
Start: 2021-07-07

## 2021-07-07 RX ORDER — DILTIAZEM HCL 120 MG
1 CAPSULE, EXT RELEASE 24 HR ORAL
Qty: 0 | Refills: 0 | DISCHARGE
Start: 2021-07-07

## 2021-07-07 RX ORDER — NYSTATIN CREAM 100000 [USP'U]/G
1 CREAM TOPICAL
Qty: 0 | Refills: 0 | DISCHARGE
Start: 2021-07-07

## 2021-07-07 RX ORDER — LANOLIN ALCOHOL/MO/W.PET/CERES
1 CREAM (GRAM) TOPICAL
Qty: 0 | Refills: 0 | DISCHARGE
Start: 2021-07-07

## 2021-07-07 RX ORDER — LEVETIRACETAM 250 MG/1
1 TABLET, FILM COATED ORAL
Qty: 0 | Refills: 0 | DISCHARGE

## 2021-07-07 RX ORDER — FUROSEMIDE 40 MG
2 TABLET ORAL
Qty: 0 | Refills: 0 | DISCHARGE

## 2021-07-07 RX ORDER — CHOLECALCIFEROL (VITAMIN D3) 125 MCG
4000 CAPSULE ORAL
Qty: 0 | Refills: 0 | DISCHARGE
Start: 2021-07-07

## 2021-07-07 RX ORDER — METOPROLOL TARTRATE 50 MG
1 TABLET ORAL
Qty: 0 | Refills: 0 | DISCHARGE
Start: 2021-07-07

## 2021-07-07 RX ORDER — DIGOXIN 250 MCG
1 TABLET ORAL
Qty: 0 | Refills: 0 | DISCHARGE
Start: 2021-07-07

## 2021-07-07 RX ORDER — SOD,AMMONIUM,POTASSIUM LACTATE
1 CREAM (GRAM) TOPICAL
Qty: 0 | Refills: 0 | DISCHARGE
Start: 2021-07-07

## 2021-07-07 RX ADMIN — Medication 360 MILLIGRAM(S): at 05:53

## 2021-07-07 RX ADMIN — RIVAROXABAN 20 MILLIGRAM(S): KIT at 17:32

## 2021-07-07 RX ADMIN — Medication 100 MILLIGRAM(S): at 05:53

## 2021-07-07 RX ADMIN — Medication 1 MILLIGRAM(S): at 11:07

## 2021-07-07 RX ADMIN — NYSTATIN CREAM 1 APPLICATION(S): 100000 CREAM TOPICAL at 05:53

## 2021-07-07 RX ADMIN — NYSTATIN CREAM 1 APPLICATION(S): 100000 CREAM TOPICAL at 17:33

## 2021-07-07 RX ADMIN — Medication 1 TABLET(S): at 11:07

## 2021-07-07 RX ADMIN — Medication 4000 UNIT(S): at 11:07

## 2021-07-07 RX ADMIN — Medication 5 MILLIGRAM(S): at 21:22

## 2021-07-07 RX ADMIN — Medication 250 MICROGRAM(S): at 05:53

## 2021-07-07 RX ADMIN — Medication 1 APPLICATION(S): at 17:32

## 2021-07-07 RX ADMIN — Medication 1 APPLICATION(S): at 05:53

## 2021-07-07 RX ADMIN — Medication 100 MILLIGRAM(S): at 19:31

## 2021-07-07 NOTE — BH CONSULTATION LIAISON PROGRESS NOTE - CURRENT MEDICATION
MEDICATIONS  (STANDING):  ammonium lactate 12% Lotion 1 Application(s) Topical two times a day  cholecalciferol 4000 Unit(s) Oral daily  digoxin     Tablet 250 MICROGram(s) Oral daily  diltiazem    milliGRAM(s) Oral daily  folic acid 1 milliGRAM(s) Oral daily  melatonin 5 milliGRAM(s) Oral at bedtime  metoprolol tartrate 100 milliGRAM(s) Oral two times a day  multivitamin 1 Tablet(s) Oral daily  nystatin Powder 1 Application(s) Topical every 12 hours  rivaroxaban 20 milliGRAM(s) Oral with dinner    MEDICATIONS  (PRN):  acetaminophen   Tablet .. 650 milliGRAM(s) Oral every 6 hours PRN Temp greater or equal to 38C (100.4F)  acetaminophen   Tablet .. 650 milliGRAM(s) Oral every 6 hours PRN Mild Pain (1 - 3)  LORazepam   Injectable 1 milliGRAM(s) IV Push every 2 hours PRN CIWA-Ar score increase by 2 points and a total score of 7 or less  LORazepam   Injectable 1 milliGRAM(s) IV Push every 1 hour PRN CIWA-Ar score 8 or greater  OLANZapine Injectable 2.5 milliGRAM(s) IntraMuscular every 6 hours PRN agitation  
MEDICATIONS  (STANDING):  ammonium lactate 12% Lotion 1 Application(s) Topical two times a day  digoxin     Tablet 250 MICROGram(s) Oral daily  diltiazem    milliGRAM(s) Oral daily  enoxaparin Injectable 100 milliGRAM(s) SubCutaneous every 12 hours  folic acid 1 milliGRAM(s) Oral daily  metoprolol tartrate 100 milliGRAM(s) Oral two times a day  multivitamin 1 Tablet(s) Oral daily  piperacillin/tazobactam IVPB.. 3.375 Gram(s) IV Intermittent every 8 hours    MEDICATIONS  (PRN):  acetaminophen   Tablet .. 650 milliGRAM(s) Oral every 6 hours PRN Temp greater or equal to 38C (100.4F)  LORazepam   Injectable 1 milliGRAM(s) IV Push every 2 hours PRN CIWA-Ar score increase by 2 points and a total score of 7 or less  LORazepam   Injectable 1 milliGRAM(s) IV Push every 1 hour PRN CIWA-Ar score 8 or greater  OLANZapine Injectable 2.5 milliGRAM(s) IntraMuscular every 6 hours PRN agitation

## 2021-07-07 NOTE — PROGRESS NOTE ADULT - PROBLEM SELECTOR PLAN 2
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

## 2021-07-07 NOTE — PHYSICAL THERAPY INITIAL EVALUATION ADULT - ADDITIONAL COMMENTS
Pt lives alone in a house, no steps. Pt ambulated independently with SC vs RW and was independent with ADLs.

## 2021-07-07 NOTE — PROGRESS NOTE ADULT - SUBJECTIVE AND OBJECTIVE BOX
Date/Time Patient Seen:  		  Referring MD:   Data Reviewed	       Patient is a 71y old  Female who presents with a chief complaint of Dizzy (06 Jul 2021 11:37)      Subjective/HPI     PAST MEDICAL & SURGICAL HISTORY:  HTN (hypertension), benign    CHF (congestive heart failure)    Pulmonary embolism    HLD (hyperlipidemia)    HTN (hypertension)    Hemochromatosis    S/P D&amp;C (status post dilation and curettage)  with IUD insertion in 2013    No significant past surgical history          Medication list         MEDICATIONS  (STANDING):  ammonium lactate 12% Lotion 1 Application(s) Topical two times a day  cholecalciferol 4000 Unit(s) Oral daily  digoxin     Tablet 250 MICROGram(s) Oral daily  diltiazem    milliGRAM(s) Oral daily  folic acid 1 milliGRAM(s) Oral daily  melatonin 5 milliGRAM(s) Oral at bedtime  metoprolol tartrate 100 milliGRAM(s) Oral two times a day  multivitamin 1 Tablet(s) Oral daily  nystatin Powder 1 Application(s) Topical every 12 hours  rivaroxaban 20 milliGRAM(s) Oral with dinner    MEDICATIONS  (PRN):  acetaminophen   Tablet .. 650 milliGRAM(s) Oral every 6 hours PRN Temp greater or equal to 38C (100.4F)  acetaminophen   Tablet .. 650 milliGRAM(s) Oral every 6 hours PRN Mild Pain (1 - 3)  LORazepam   Injectable 1 milliGRAM(s) IV Push every 2 hours PRN CIWA-Ar score increase by 2 points and a total score of 7 or less  LORazepam   Injectable 1 milliGRAM(s) IV Push every 1 hour PRN CIWA-Ar score 8 or greater  OLANZapine Injectable 2.5 milliGRAM(s) IntraMuscular every 6 hours PRN agitation         Vitals log        ICU Vital Signs Last 24 Hrs  T(C): 36.9 (07 Jul 2021 04:37), Max: 37.6 (06 Jul 2021 10:01)  T(F): 98.4 (07 Jul 2021 04:37), Max: 99.6 (06 Jul 2021 10:01)  HR: 83 (07 Jul 2021 04:37) (70 - 87)  BP: 123/68 (07 Jul 2021 04:37) (105/66 - 123/68)  BP(mean): --  ABP: --  ABP(mean): --  RR: 18 (07 Jul 2021 04:37) (18 - 18)  SpO2: 96% (07 Jul 2021 04:37) (95% - 96%)           Input and Output:  I&O's Detail    05 Jul 2021 07:01  -  06 Jul 2021 07:00  --------------------------------------------------------  IN:  Total IN: 0 mL    OUT:    Voided (mL): 620 mL  Total OUT: 620 mL    Total NET: -620 mL      06 Jul 2021 07:01  -  07 Jul 2021 06:15  --------------------------------------------------------  IN:  Total IN: 0 mL    OUT:    Voided (mL): 1220 mL  Total OUT: 1220 mL    Total NET: -1220 mL          Lab Data                        10.9   12.07 )-----------( 501      ( 06 Jul 2021 06:52 )             34.0     07-06    142  |  109<H>  |  15  ----------------------------<  112<H>  3.6   |  26  |  0.58    Ca    9.5      06 Jul 2021 06:52  Mg     1.9     07-06              Review of Systems	      Objective     Physical Examination    heart s1s2  lung dec BS  abd soft  head nc      Pertinent Lab findings & Imaging      Ino:  NO   Adequate UO     I&O's Detail    05 Jul 2021 07:01  -  06 Jul 2021 07:00  --------------------------------------------------------  IN:  Total IN: 0 mL    OUT:    Voided (mL): 620 mL  Total OUT: 620 mL    Total NET: -620 mL      06 Jul 2021 07:01  -  07 Jul 2021 06:15  --------------------------------------------------------  IN:  Total IN: 0 mL    OUT:    Voided (mL): 1220 mL  Total OUT: 1220 mL    Total NET: -1220 mL               Discussed with:     Cultures:	        Radiology

## 2021-07-07 NOTE — PROGRESS NOTE ADULT - SUBJECTIVE AND OBJECTIVE BOX
[facility], Division of Infectious Diseases  HASMUKH Buchanan Y. Patel, S. Shah  [phone]    Name: JOSAFAT CLEMENTE  Age: 71y  Gender: Female  MRN: [id number]    Interval History:  Patient seen and examined at bedside this morning  No acute overnight events. Afebrile. Low grade temps to 99F noted  Pt c/o of HA this Am, no other complaints  Notes reviewed    Antibiotics:      Medications:  acetaminophen   Tablet .. 650 milliGRAM(s) Oral every 6 hours PRN  acetaminophen   Tablet .. 650 milliGRAM(s) Oral every 6 hours PRN  ammonium lactate 12% Lotion 1 Application(s) Topical two times a day  cholecalciferol 4000 Unit(s) Oral daily  digoxin     Tablet 250 MICROGram(s) Oral daily  diltiazem    milliGRAM(s) Oral daily  folic acid 1 milliGRAM(s) Oral daily  LORazepam   Injectable 1 milliGRAM(s) IV Push every 2 hours PRN  LORazepam   Injectable 1 milliGRAM(s) IV Push every 1 hour PRN  melatonin 5 milliGRAM(s) Oral at bedtime  metoprolol tartrate 100 milliGRAM(s) Oral two times a day  multivitamin 1 Tablet(s) Oral daily  nystatin Powder 1 Application(s) Topical every 12 hours  OLANZapine Injectable 2.5 milliGRAM(s) IntraMuscular every 6 hours PRN  rivaroxaban 20 milliGRAM(s) Oral with dinner      Review of Systems:  A 10-point review of systems was obtained.    Review of systems otherwise negative except as previously noted.    Allergies: No Known Allergies    For details regarding the patient's past medical history, social history, family history, and other miscellaneous elements, please refer the initial infectious diseases consultation and/or the admitting history and physical examination for this admission.    Objective:  Vitals:   T(C): 36.9 (07-07-21 @ 04:37), Max: 37 (07-06-21 @ 16:31)  HR: 83 (07-07-21 @ 04:37) (83 - 87)  BP: 123/68 (07-07-21 @ 04:37) (117/77 - 123/68)  RR: 18 (07-07-21 @ 04:37) (18 - 18)  SpO2: 96% (07-07-21 @ 04:37) (96% - 96%)    Physical Examination:  General: no acute distress  HEENT: NC/AT, EOMI, anicteric, no oral lesions  Neck: supple, no palpable LAD  Cardio: S1, S2 heard, RRR, no murmurs  Resp: breath sounds heard bilaterally, no rales, wheezes or rhonchi  Abd: soft, NT, ND, + bowel sounds  Ext: no edema or cyanosis  Skin: warm, dry, no visible rash      Laboratory Studies:  CBC:                       10.9   12.07 )-----------( 501      ( 06 Jul 2021 06:52 )             34.0     CMP: 07-06    142  |  109<H>  |  15  ----------------------------<  112<H>  3.6   |  26  |  0.58    Ca    9.5      06 Jul 2021 06:52  Mg     1.9     07-06            Microbiology: reviewed    Culture - Urine (collected 06-29-21 @ 15:06)  Source: .Urine Clean Catch (Midstream)  Final Report (06-30-21 @ 12:56):    No growth    Culture - Blood (collected 06-29-21 @ 12:13)  Source: .Blood Blood  Final Report (07-04-21 @ 13:01):    No Growth Final    Culture - Blood (collected 06-29-21 @ 12:13)  Source: .Blood Blood  Final Report (07-04-21 @ 13:01):    No Growth Final    Culture - Urine (collected 06-25-21 @ 00:40)  Source: .Urine Clean Catch (Midstream)  Final Report (06-27-21 @ 09:14):    >100,000 CFU/ml Klebsiella pneumoniae    Normal Urogenital jignesh present  Organism: Klebsiella pneumoniae (06-27-21 @ 09:14)  Organism: Klebsiella pneumoniae (06-27-21 @ 09:14)      -  Amikacin: S <=16      -  Amoxicillin/Clavulanic Acid: S <=8/4      -  Ampicillin: R 16 These ampicillin results predict results for amoxicillin      -  Ampicillin/Sulbactam: S <=4/2 Enterobacter, Citrobacter, and Serratia may develop resistance during prolonged therapy (3-4 days)      -  Aztreonam: S <=4      -  Cefazolin: S <=2 (MIC_CL_COM_ENTERIC_CEFAZU) For uncomplicated UTI with K. pneumoniae, E. coli, or P. mirablis: MICHELLE <=16 is sensitive and MICHELLE >=32 is resistant. This also predicts results for oral agents cefaclor, cefdinir, cefpodoxime, cefprozil, cefuroxime axetil, cephalexin and locarbef for uncomplicated UTI. Note that some isolates may be susceptible to these agents while testing resistant to cefazolin.      -  Cefepime: S <=2      -  Cefoxitin: S <=8      -  Ceftriaxone: S <=1 Enterobacter, Citrobacter, and Serratia may develop resistance during prolonged therapy      -  Ciprofloxacin: S <=0.25      -  Ertapenem: S <=0.5      -  Gentamicin: S <=2      -  Imipenem: I 2      -  Levofloxacin: S <=0.5      -  Meropenem: S <=1      -  Nitrofurantoin: I 64 Should not be used to treat pyelonephritis      -  Piperacillin/Tazobactam: S <=8      -  Tigecycline: S <=2      -  Tobramycin: S <=2      -  Trimethoprim/Sulfamethoxazole: S <=0.5/9.5      Method Type: MICHELLE        Radiology: reviewed

## 2021-07-07 NOTE — DISCHARGE NOTE PROVIDER - HOSPITAL COURSE
The patient is a 68 year old female with a history of HTN, HL, DVT, lymphedema, hemochromatosis who was sent in from [facility] with rapid atrial fibrillation. She states over the last couple of months she has felt lightheaded and nauseous at times. No palpitations, chest pain, shortness of breath. She is able to complete exercises at home. She has not seen her cardiologist Dr. Gonzalez in over a year. No recent cardiac testing.    Patient admitted and treated for FRANC  Patient became acute delirious -- likely secondary to EtOH withdrawal.  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  Patient's HCP has decided to send patient to [facility] and then place her in a SNF.    >35 minutes spent on discharge

## 2021-07-07 NOTE — PROGRESS NOTE ADULT - SUBJECTIVE AND OBJECTIVE BOX
Patient is a 71y old  Female who presents with a chief complaint of Dizzy (07 Jul 2021 10:03)      INTERVAL HPI/OVERNIGHT EVENTS: Patient seen and examined. NAD. No complaints.    Vital Signs Last 24 Hrs  T(C): 36.9 (07 Jul 2021 04:37), Max: 37 (06 Jul 2021 16:31)  T(F): 98.4 (07 Jul 2021 04:37), Max: 98.6 (06 Jul 2021 16:31)  HR: 83 (07 Jul 2021 04:37) (83 - 87)  BP: 123/68 (07 Jul 2021 04:37) (117/77 - 123/68)  BP(mean): --  RR: 18 (07 Jul 2021 04:37) (18 - 18)  SpO2: 96% (07 Jul 2021 04:37) (96% - 96%)    07-06    142  |  109<H>  |  15  ----------------------------<  112<H>  3.6   |  26  |  0.58    Ca    9.5      06 Jul 2021 06:52  Mg     1.9     07-06                            10.9   12.07 )-----------( 501      ( 06 Jul 2021 06:52 )             34.0       CAPILLARY BLOOD GLUCOSE                  acetaminophen   Tablet .. 650 milliGRAM(s) Oral every 6 hours PRN  acetaminophen   Tablet .. 650 milliGRAM(s) Oral every 6 hours PRN  ammonium lactate 12% Lotion 1 Application(s) Topical two times a day  cholecalciferol 4000 Unit(s) Oral daily  digoxin     Tablet 250 MICROGram(s) Oral daily  diltiazem    milliGRAM(s) Oral daily  folic acid 1 milliGRAM(s) Oral daily  LORazepam   Injectable 1 milliGRAM(s) IV Push every 2 hours PRN  LORazepam   Injectable 1 milliGRAM(s) IV Push every 1 hour PRN  melatonin 5 milliGRAM(s) Oral at bedtime  metoprolol tartrate 100 milliGRAM(s) Oral two times a day  multivitamin 1 Tablet(s) Oral daily  nystatin Powder 1 Application(s) Topical every 12 hours  OLANZapine Injectable 2.5 milliGRAM(s) IntraMuscular every 6 hours PRN  rivaroxaban 20 milliGRAM(s) Oral with dinner              REVIEW OF SYSTEMS:  CONSTITUTIONAL: No fever, no weight loss, or no fatigue  NECK: No pain, no stiffness  RESPIRATORY: No cough, no wheezing, no chills, no hemoptysis, No shortness of breath  CARDIOVASCULAR: No chest pain, no palpitations, no dizziness, no leg swelling  GASTROINTESTINAL: No abdominal pain. No nausea, no vomiting, no hematemesis; No diarrhea, no constipation. No melena, no hematochezia.  GENITOURINARY: No dysuria, no frequency, no hematuria, no incontinence  NEUROLOGICAL: No headaches, no loss of strength, no numbness, no tremors  SKIN: No itching, no burning  MUSCULOSKELETAL: No joint pain, no swelling; No muscle, no back, no extremity pain  PSYCHIATRIC: No depression, no mood swings,   HEME/LYMPH: No easy bruising, no bleeding gums  ALLERY AND IMMUNOLOGIC: No hives       Consultant(s) Notes Reviewed:  [X] YES  [ ] NO    PHYSICAL EXAM:  GENERAL: NAD  HEAD:  Atraumatic, Normocephalic  EYES: EOMI, PERRLA, conjunctiva and sclera clear  ENMT: No tonsillar erythema, exudates, or enlargement; Moist mucous membranes  NECK: Supple, No JVD  NERVOUS SYSTEM:  Awake & alert  CHEST/LUNG: Clear to auscultation bilaterally; No rales, rhonchi, wheezing,  HEART: Regular rate and rhythm  ABDOMEN: Soft, Nontender, Nondistended; Bowel sounds present  EXTREMITIES:  No clubbing, cyanosis, or edema  LYMPH: No lymphadenopathy noted  SKIN: No rashes      Advanced care planning discussed with patient/family [X] YES   [ ] NO    Advanced care planning discussed with patient/family. Patient's health status was discussed. All appropriate changes have been made regarding patient's end-of-life care. Advanced care planning forms reviewed/discussed/completed.  20 minutes spent.

## 2021-07-07 NOTE — DISCHARGE NOTE PROVIDER - NSDCCPCAREPLAN_GEN_ALL_CORE_FT
PRINCIPAL DISCHARGE DIAGNOSIS  Diagnosis: Rapid atrial fibrillation  Assessment and Plan of Treatment: Continue current medications

## 2021-07-07 NOTE — PROGRESS NOTE ADULT - SUBJECTIVE AND OBJECTIVE BOX
Neurology follow up note    JOSAFAT [id number]      Interval History:    Patient feels ok no new complaints.    MEDICATIONS    acetaminophen   Tablet .. 650 milliGRAM(s) Oral every 6 hours PRN  acetaminophen   Tablet .. 650 milliGRAM(s) Oral every 6 hours PRN  ammonium lactate 12% Lotion 1 Application(s) Topical two times a day  cholecalciferol 4000 Unit(s) Oral daily  digoxin     Tablet 250 MICROGram(s) Oral daily  diltiazem    milliGRAM(s) Oral daily  folic acid 1 milliGRAM(s) Oral daily  LORazepam   Injectable 1 milliGRAM(s) IV Push every 2 hours PRN  LORazepam   Injectable 1 milliGRAM(s) IV Push every 1 hour PRN  melatonin 5 milliGRAM(s) Oral at bedtime  metoprolol tartrate 100 milliGRAM(s) Oral two times a day  multivitamin 1 Tablet(s) Oral daily  nystatin Powder 1 Application(s) Topical every 12 hours  OLANZapine Injectable 2.5 milliGRAM(s) IntraMuscular every 6 hours PRN  rivaroxaban 20 milliGRAM(s) Oral with dinner      Allergies    No Known Allergies    Intolerances            Vital Signs Last 24 Hrs  T(C): 36.9 (07 Jul 2021 04:37), Max: 37 (06 Jul 2021 16:31)  T(F): 98.4 (07 Jul 2021 04:37), Max: 98.6 (06 Jul 2021 16:31)  HR: 83 (07 Jul 2021 04:37) (83 - 87)  BP: 123/68 (07 Jul 2021 04:37) (117/77 - 123/68)  BP(mean): --  RR: 18 (07 Jul 2021 04:37) (18 - 18)  SpO2: 96% (07 Jul 2021 04:37) (96% - 96%)    REVIEW OF SYSTEMS:  Constitutional:  The patient denies fever, chills, or night sweats.  Head:  At present, no headaches or lightheadedness.  Eyes:  No double vision, but does have blurry vision, suspect she thinks she has macular degeneration.  Ears:  No ringing in ears.  Neck:  No neck pain.  Respiratory:  No shortness of breath.  Cardiovascular:  No chest pain.  Abdomen:  No nausea, vomiting, or abdominal pain.  Extremities/Neurological:  Has numbness and tingling of her feet, but states she has herniated disc in her back.  This is not new.  Musculoskeletal:  Occasional joint pain, possibly from arthritis.    PHYSICAL EXAMINATION:  HEENT:  Head:  Positive trauma was noted over the left eye.  No scleral icterus was noted.  Ears:  Hearing bilaterally intact.  NECK:  Supple.  RESPIRATORY:  Good air entry bilaterally.  CARDIOVASCULAR:  S1 and S2 heard.  ABDOMEN:  Soft and nontender.  EXTREMITIES:  No clubbing was noted.      NEUROLOGIC:  The patient is awake   Location was hospital, year was 2021,  She was able to follow simple  and complex commands  Extraocular movements were intact, full visual field.    Speech was fluent.  Smile was symmetric.  Motor:  Bilateral upper were 4/5, bilateral lower were 3-/5.   able to name objects                   LABS:  CBC Full  -  ( 06 Jul 2021 06:52 )  WBC Count : 12.07 K/uL  RBC Count : 3.33 M/uL  Hemoglobin : 10.9 g/dL  Hematocrit : 34.0 %  Platelet Count - Automated : 501 K/uL  Mean Cell Volume : 102.1 fl  Mean Cell Hemoglobin : 32.7 pg  Mean Cell Hemoglobin Concentration : 32.1 gm/dL  Auto Neutrophil # : x  Auto Lymphocyte # : x  Auto Monocyte # : x  Auto Eosinophil # : x  Auto Basophil # : x  Auto Neutrophil % : x  Auto Lymphocyte % : x  Auto Monocyte % : x  Auto Eosinophil % : x  Auto Basophil % : x      07-06    142  |  109<H>  |  15  ----------------------------<  112<H>  3.6   |  26  |  0.58    Ca    9.5      06 Jul 2021 06:52  Mg     1.9     07-06      Hemoglobin A1C:       Vitamin B12         RADIOLOGY      ANALYSIS AND PLAN:  This is a 71-year-old with an episode of altered mental status.  Suspect patients AMS is possible from the history of hemochromatosis with oxidative stress and iron leading to a chronic neurodegenerative deterioration in overall cognitive status which unfortunately will be progressive. Questionable, the patient has an worsening mild cognitive impairment that is progressively becoming worse with hospital setting leading to the hospital delirium, possible metabolic encephalopathy.  antibiotics as needed   For history of atrial fibrillation, anticoagulation as needed.  For history of hypertension, monitor systolic blood pressure.  For history of hyperlipidemia, condition the patient on statin.  Fall precautions.  monitor oxygen and CO2 levels as needed  recommend Psychiatry follow-up in regards to control of symptoms.   MRI no clear changes   patient  H/O of encephalitis  was not infection rather autoimmune  possible H/O of ETOH use   will hold AC for now will see how patient mental status   for fevers questionable parathyroid dysfunctions leading to a thermoregulation problem with temperatures   more interactive today 7/6/21    Spoke with the healthcare proxy, Tamiko, at [phone] 7/3/21. she understands that she will deteriorate cognitive wise as time goes on will most likely need placement     Greater than 26  minutes of time was spent with the patient, plan of care, reviewing data, with greater than 50% of the visit was spent counseling and/or coordinating care with multidisciplinary healthcare team

## 2021-07-07 NOTE — CHART NOTE - NSCHARTNOTEFT_GEN_A_CORE
Assessment:  Pt seen for nutrition follow up.  Chart reviewed, hospital course noted.  The patient is a 68 year old female with a history of HTN, HL, DVT, lymphedema, hemochromatosis who was sent in from [facility] with rapid atrial fibrillation.     Tolerating DASH/TLC diet.  Admits to using table salt on some foods PTA.  Low Vit D level noted- on D3 supplementation. Pt reports she is not surprised by this as she hadn't been spending much time outdoors PTA.   Low MCV (B12/folate WNL) may be due to chronic EtOH use.  On MVI and folic acid- recommend thiamine supplementation. +BM 7/6.   ~8# wt loss noted since admission. 3% is clinically significant however in the absence of a second criteria does not meet malnutrition dx.  No overt signs of muscle wasting or fat loss, eating well.     Factors impacting intake: [x] none [ ] nausea  [ ] vomiting [ ] diarrhea [ ] constipation  [ ]chewing problems [ ] swallowing issues  [ ] other:     Diet Presciption: Diet, DASH/TLC:   Sodium & Cholesterol Restricted (06-24-21 @ 21:57)    Intake:  PO intake noted mostly 50-75% however, pt reports eating very well in house.     Current Weight: 6/25 238.3#, 7/5 230.3#, 7/7 230.8#;  may be due to reduction in LE edema which pt states is somewhat better since PTA since pt states she is eating well.  Pt states she is probably using less salt here than at home.   % Weight Change    Pertinent Medications: MEDICATIONS  (STANDING):  ammonium lactate 12% Lotion 1 Application(s) Topical two times a day  cholecalciferol 4000 Unit(s) Oral daily  digoxin     Tablet 250 MICROGram(s) Oral daily  diltiazem    milliGRAM(s) Oral daily  folic acid 1 milliGRAM(s) Oral daily  melatonin 5 milliGRAM(s) Oral at bedtime  metoprolol tartrate 100 milliGRAM(s) Oral two times a day  multivitamin 1 Tablet(s) Oral daily  nystatin Powder 1 Application(s) Topical every 12 hours  rivaroxaban 20 milliGRAM(s) Oral with dinner    MEDICATIONS  (PRN):  acetaminophen   Tablet .. 650 milliGRAM(s) Oral every 6 hours PRN Temp greater or equal to 38C (100.4F)  acetaminophen   Tablet .. 650 milliGRAM(s) Oral every 6 hours PRN Mild Pain (1 - 3)  LORazepam   Injectable 1 milliGRAM(s) IV Push every 2 hours PRN CIWA-Ar score increase by 2 points and a total score of 7 or less  LORazepam   Injectable 1 milliGRAM(s) IV Push every 1 hour PRN CIWA-Ar score 8 or greater  OLANZapine Injectable 2.5 milliGRAM(s) IntraMuscular every 6 hours PRN agitation    Pertinent Labs: 07-06 Na142 mmol/L Glu 112 mg/dL<H> K+ 3.6 mmol/L Cr  0.58 mg/dL BUN 15 mg/dL     CAPILLARY BLOOD GLUCOSE        Skin: no pressure injuries  Edema: 2+ LE    Estimated Needs:   [x] no change since previous assessment  [ ] recalculated:     Previous Nutrition Diagnosis:  NONE noted    [ ] Inadequate Energy Intake [ ]Inadequate Oral Intake [ ] Excessive Energy Intake   [ ] Underweight [ ] Increased Nutrient Needs [ ] Overweight/Obesity   [ ] Altered GI Function [ ] Unintended Weight Loss [ ] Food & Nutrition Related Knowledge Deficit [ ] Malnutrition     Nutrition Diagnosis is [ ] ongoing  [ ] resolved [x] not applicable     New Nutrition Diagnosis: [ ] not applicable       Interventions:   Recommend  [ ] Change Diet To:  [ ] Nutrition Supplement  [ ] Nutrition Support  [x] Other: continue D3 supplementation as outpatient.  Recommend thiamine supplementation.     Monitoring and Evaluation:   [x] PO intake [ x ] Tolerance to diet prescription [ x ] weights [ x ] labs[ x ] follow up per protocol  [x] other: s/s GI distress, bowel function, skin integrity, edema

## 2021-07-07 NOTE — BH CONSULTATION LIAISON PROGRESS NOTE - ADDITIONAL DETAILS / COMMENTS
Patient is 1) cognitively intact 2) Understands the nature of the medical condition, risks, benefits, alternatives and side-effects of treatment 3) Wants to make decisions voluntarily.  At this time patient has decision making capacity regarding medical decisions.

## 2021-07-07 NOTE — PROGRESS NOTE ADULT - SUBJECTIVE AND OBJECTIVE BOX
Chief Complaint: Tachycardia    Interval Events: No events overnight.    Review of Systems:  General: No fevers, chills, weight loss or gain  Skin: No rashes, color changes  Cardiovascular: No chest pain, orthopnea  Respiratory: No shortness of breath, cough  Gastrointestinal: No nausea, abdominal pain  Genitourinary: No incontinence, pain with urination  Musculoskeletal: No pain, swelling, decreased range of motion  Neurological: No headache, weakness  Psychiatric: No depression, anxiety  Endocrine: No weight loss or gain, increased thirst  All other systems are comprehensively negative.    Physical Exam:  Vital Signs Last 24 Hrs  T(C): 36.9 (07 Jul 2021 04:37), Max: 37.6 (06 Jul 2021 10:01)  T(F): 98.4 (07 Jul 2021 04:37), Max: 99.6 (06 Jul 2021 10:01)  HR: 83 (07 Jul 2021 04:37) (70 - 87)  BP: 123/68 (07 Jul 2021 04:37) (105/66 - 123/68)  BP(mean): --  RR: 18 (07 Jul 2021 04:37) (18 - 18)  SpO2: 96% (07 Jul 2021 04:37) (95% - 96%)  General: NAD  HEENT: MMM  Neck: No JVD, no carotid bruit  Lungs: CTAB  CV: Irregular, nl S1/S2, no M/R/G  Abdomen: S/NT/ND, +BS  Extremities: No LE edema, no cyanosis  Neuro: AAOx3, non-focal  Skin: No rash    Labs:    07-06    142  |  109<H>  |  15  ----------------------------<  112<H>  3.6   |  26  |  0.58    Ca    9.5      06 Jul 2021 06:52  Mg     1.9     07-06                          10.9   12.07 )-----------( 501      ( 06 Jul 2021 06:52 )             34.0

## 2021-07-07 NOTE — DISCHARGE NOTE PROVIDER - CARE PROVIDER_API CALL
Darcie Sotomayor)  Internal Medicine  [address] [place] [address]  Phone: [phone]  Fax: [phone]  Established Patient  Follow Up Time: 1 week

## 2021-07-07 NOTE — BH CONSULTATION LIAISON PROGRESS NOTE - NSBHCHARTREVIEWVS_PSY_A_CORE FT
Vital Signs Last 24 Hrs  T(C): 37.5 (07 Jul 2021 12:58), Max: 37.5 (07 Jul 2021 12:58)  T(F): 99.5 (07 Jul 2021 12:58), Max: 99.5 (07 Jul 2021 12:58)  HR: 63 (07 Jul 2021 12:58) (63 - 87)  BP: 108/66 (07 Jul 2021 12:58) (108/66 - 123/68)  BP(mean): --  RR: 17 (07 Jul 2021 12:58) (17 - 18)  SpO2: 96% (07 Jul 2021 12:58) (96% - 96%)

## 2021-07-07 NOTE — BH CONSULTATION LIAISON PROGRESS NOTE - NSBHMSEBODY_PSY_A_CORE
NN health screenings:    Ms Henrietta Landaverde stated she was in the office today and she signed another medical release form for the most recent colonoscopy report. She states she had the colonoscopy done in 2015 and \"they must have sent you the wrong report-they sent you the 2008 report. \" Will continue to follow until the appropriate report is on the chart. I have removed \"not a candidate for pap smears\" from the  as she now states she had pap smears after her hysterectomy was done and states the doctor during my last one said I don't need them anymore. Informed her it is recommended she have them done every 3 yrs until after the age of 74 and then they are no longer recommended. She wishes to discuss with Dr. Haim Quintero during her next appointment.
Overweight
Overweight

## 2021-07-07 NOTE — BH CONSULTATION LIAISON PROGRESS NOTE - NSBHFUPINTERVALHXFT_PSY_A_CORE
Patient seen, evaluated and chart reviewed. Patient appears to be significantly more lucid that when seen over the last few days. She gave a very convoluted explanation of her injuries, but it appears that it was related to alcohol use. Currently denies symptoms of psychosis, rachel or depression.
Patient seen, evaluated and chart reviewed. Patient appears to be significantly more lucid that when seen over the last few days. She continues to have limited insight into her alcohol problem. Currently denies symptoms of psychosis, rachel or depression.

## 2021-07-07 NOTE — DISCHARGE NOTE PROVIDER - NSDCMRMEDTOKEN_GEN_ALL_CORE_FT
ammonium lactate 12% topical lotion: 1 application topically 2 times a day  atorvastatin 10 mg oral tablet: 1 tab(s) orally once a day  cholecalciferol oral tablet: 4000 unit(s) orally once a day  digoxin 250 mcg (0.25 mg) oral tablet: 1 tab(s) orally once a day  dilTIAZem 360 mg/24 hours oral capsule, extended release: 1 cap(s) orally once a day  folic acid 1 mg oral tablet: 1 tab(s) orally once a day  melatonin 5 mg oral tablet: 1 tab(s) orally once a day (at bedtime)  metoprolol tartrate 100 mg oral tablet: 1 tab(s) orally 2 times a day  Multiple Vitamins oral tablet: 1 tab(s) orally once a day  nystatin 100,000 units/g topical powder: 1 application topically every 12 hours  Xarelto 20 mg oral tablet: 1 tab(s) orally once a day (in the evening)

## 2021-07-07 NOTE — BH CONSULTATION LIAISON PROGRESS NOTE - NSBHCHARTREVIEWLAB_PSY_A_CORE FT
12.8   10.63 )-----------( 221      ( 28 Jun 2021 07:25 )             38.8   06-28    143  |  105  |  7   ----------------------------<  123<H>  3.4<L>   |  28  |  0.58    Ca    8.6      28 Jun 2021 07:25  Phos  3.2     06-28  Mg     1.5     06-28    TPro  6.6  /  Alb  3.2<L>  /  TBili  0.7  /  DBili  .30<H>  /  AST  37  /  ALT  23  /  AlkPhos  97  06-26  
                      10.9   12.07 )-----------( 501      ( 06 Jul 2021 06:52 )             34.0   07-06    142  |  109<H>  |  15  ----------------------------<  112<H>  3.6   |  26  |  0.58    Ca    9.5      06 Jul 2021 06:52  Mg     1.9     07-06

## 2021-07-07 NOTE — BH CONSULTATION LIAISON PROGRESS NOTE - NSBHCONSULTFOLLOW_PSY_ALL_CORE
Pt called back and was informed of normal results.   
No, psychiatric follow up needed - call with questions...
Yes...

## 2021-07-08 ENCOUNTER — TRANSCRIPTION ENCOUNTER (OUTPATIENT)
Age: 71
End: 2021-07-08

## 2021-07-08 VITALS
HEART RATE: 75 BPM | SYSTOLIC BLOOD PRESSURE: 137 MMHG | DIASTOLIC BLOOD PRESSURE: 76 MMHG | RESPIRATION RATE: 18 BRPM | TEMPERATURE: 98 F | OXYGEN SATURATION: 94 %

## 2021-07-08 PROCEDURE — 87635 SARS-COV-2 COVID-19 AMP PRB: CPT

## 2021-07-08 PROCEDURE — 80053 COMPREHEN METABOLIC PANEL: CPT

## 2021-07-08 PROCEDURE — 83550 IRON BINDING TEST: CPT

## 2021-07-08 PROCEDURE — 86036 ANCA SCREEN EACH ANTIBODY: CPT

## 2021-07-08 PROCEDURE — 86038 ANTINUCLEAR ANTIBODIES: CPT

## 2021-07-08 PROCEDURE — 70551 MRI BRAIN STEM W/O DYE: CPT

## 2021-07-08 PROCEDURE — 83735 ASSAY OF MAGNESIUM: CPT

## 2021-07-08 PROCEDURE — 97162 PT EVAL MOD COMPLEX 30 MIN: CPT

## 2021-07-08 PROCEDURE — 71045 X-RAY EXAM CHEST 1 VIEW: CPT

## 2021-07-08 PROCEDURE — 86431 RHEUMATOID FACTOR QUANT: CPT

## 2021-07-08 PROCEDURE — 87186 SC STD MICRODIL/AGAR DIL: CPT

## 2021-07-08 PROCEDURE — 81001 URINALYSIS AUTO W/SCOPE: CPT

## 2021-07-08 PROCEDURE — 82962 GLUCOSE BLOOD TEST: CPT

## 2021-07-08 PROCEDURE — 84100 ASSAY OF PHOSPHORUS: CPT

## 2021-07-08 PROCEDURE — 80076 HEPATIC FUNCTION PANEL: CPT

## 2021-07-08 PROCEDURE — 86803 HEPATITIS C AB TEST: CPT

## 2021-07-08 PROCEDURE — 82746 ASSAY OF FOLIC ACID SERUM: CPT

## 2021-07-08 PROCEDURE — 86140 C-REACTIVE PROTEIN: CPT

## 2021-07-08 PROCEDURE — 87086 URINE CULTURE/COLONY COUNT: CPT

## 2021-07-08 PROCEDURE — 87389 HIV-1 AG W/HIV-1&-2 AB AG IA: CPT

## 2021-07-08 PROCEDURE — 85730 THROMBOPLASTIN TIME PARTIAL: CPT

## 2021-07-08 PROCEDURE — 84443 ASSAY THYROID STIM HORMONE: CPT

## 2021-07-08 PROCEDURE — 96375 TX/PRO/DX INJ NEW DRUG ADDON: CPT

## 2021-07-08 PROCEDURE — 80048 BASIC METABOLIC PNL TOTAL CA: CPT

## 2021-07-08 PROCEDURE — 70450 CT HEAD/BRAIN W/O DYE: CPT | Mod: MA

## 2021-07-08 PROCEDURE — 84466 ASSAY OF TRANSFERRIN: CPT

## 2021-07-08 PROCEDURE — 82803 BLOOD GASES ANY COMBINATION: CPT

## 2021-07-08 PROCEDURE — 85652 RBC SED RATE AUTOMATED: CPT

## 2021-07-08 PROCEDURE — 86769 SARS-COV-2 COVID-19 ANTIBODY: CPT

## 2021-07-08 PROCEDURE — 70486 CT MAXILLOFACIAL W/O DYE: CPT | Mod: MA

## 2021-07-08 PROCEDURE — 96374 THER/PROPH/DIAG INJ IV PUSH: CPT

## 2021-07-08 PROCEDURE — 82607 VITAMIN B-12: CPT

## 2021-07-08 PROCEDURE — 83970 ASSAY OF PARATHORMONE: CPT

## 2021-07-08 PROCEDURE — 82140 ASSAY OF AMMONIA: CPT

## 2021-07-08 PROCEDURE — 93306 TTE W/DOPPLER COMPLETE: CPT

## 2021-07-08 PROCEDURE — 93005 ELECTROCARDIOGRAM TRACING: CPT

## 2021-07-08 PROCEDURE — 97110 THERAPEUTIC EXERCISES: CPT

## 2021-07-08 PROCEDURE — 85610 PROTHROMBIN TIME: CPT

## 2021-07-08 PROCEDURE — 71250 CT THORAX DX C-: CPT

## 2021-07-08 PROCEDURE — 82310 ASSAY OF CALCIUM: CPT

## 2021-07-08 PROCEDURE — 84484 ASSAY OF TROPONIN QUANT: CPT

## 2021-07-08 PROCEDURE — 36415 COLL VENOUS BLD VENIPUNCTURE: CPT

## 2021-07-08 PROCEDURE — 85027 COMPLETE CBC AUTOMATED: CPT

## 2021-07-08 PROCEDURE — 83880 ASSAY OF NATRIURETIC PEPTIDE: CPT

## 2021-07-08 PROCEDURE — 82550 ASSAY OF CK (CPK): CPT

## 2021-07-08 PROCEDURE — U0005: CPT

## 2021-07-08 PROCEDURE — 80307 DRUG TEST PRSMV CHEM ANLYZR: CPT

## 2021-07-08 PROCEDURE — 82553 CREATINE MB FRACTION: CPT

## 2021-07-08 PROCEDURE — 99285 EMERGENCY DEPT VISIT HI MDM: CPT

## 2021-07-08 PROCEDURE — 86780 TREPONEMA PALLIDUM: CPT

## 2021-07-08 PROCEDURE — 82728 ASSAY OF FERRITIN: CPT

## 2021-07-08 PROCEDURE — 87077 CULTURE AEROBIC IDENTIFY: CPT

## 2021-07-08 PROCEDURE — 85025 COMPLETE CBC W/AUTO DIFF WBC: CPT

## 2021-07-08 PROCEDURE — 87040 BLOOD CULTURE FOR BACTERIA: CPT

## 2021-07-08 PROCEDURE — U0003: CPT

## 2021-07-08 PROCEDURE — 84145 PROCALCITONIN (PCT): CPT

## 2021-07-08 PROCEDURE — 83540 ASSAY OF IRON: CPT

## 2021-07-08 PROCEDURE — 82306 VITAMIN D 25 HYDROXY: CPT

## 2021-07-08 PROCEDURE — 72125 CT NECK SPINE W/O DYE: CPT | Mod: MA

## 2021-07-08 RX ORDER — BNT162B2 0.23 MG/2.25ML
0.3 INJECTION, SUSPENSION INTRAMUSCULAR ONCE
Refills: 0 | Status: DISCONTINUED | OUTPATIENT
Start: 2021-07-08 | End: 2021-07-08

## 2021-07-08 RX ORDER — BNT162B2 0.23 MG/2.25ML
0.3 INJECTION, SUSPENSION INTRAMUSCULAR ONCE
Refills: 0 | Status: COMPLETED | OUTPATIENT
Start: 2021-07-08 | End: 2021-07-08

## 2021-07-08 RX ADMIN — RIVAROXABAN 20 MILLIGRAM(S): KIT at 17:10

## 2021-07-08 RX ADMIN — Medication 360 MILLIGRAM(S): at 05:05

## 2021-07-08 RX ADMIN — Medication 1 MILLIGRAM(S): at 11:37

## 2021-07-08 RX ADMIN — Medication 1 APPLICATION(S): at 17:11

## 2021-07-08 RX ADMIN — Medication 4000 UNIT(S): at 11:37

## 2021-07-08 RX ADMIN — Medication 100 MILLIGRAM(S): at 17:10

## 2021-07-08 RX ADMIN — BNT162B2 0.3 MILLILITER(S): 0.23 INJECTION, SUSPENSION INTRAMUSCULAR at 12:55

## 2021-07-08 RX ADMIN — Medication 1 TABLET(S): at 11:37

## 2021-07-08 RX ADMIN — NYSTATIN CREAM 1 APPLICATION(S): 100000 CREAM TOPICAL at 05:06

## 2021-07-08 RX ADMIN — Medication 100 MILLIGRAM(S): at 05:05

## 2021-07-08 RX ADMIN — Medication 1 APPLICATION(S): at 05:06

## 2021-07-08 RX ADMIN — Medication 250 MICROGRAM(S): at 05:05

## 2021-07-08 RX ADMIN — NYSTATIN CREAM 1 APPLICATION(S): 100000 CREAM TOPICAL at 17:12

## 2021-07-08 NOTE — PROGRESS NOTE ADULT - SUBJECTIVE AND OBJECTIVE BOX
Patient is a 71y old  Female who presents with a chief complaint of Dizzy (08 Jul 2021 10:09)      INTERVAL HPI/OVERNIGHT EVENTS: Patient seen and examined. NAD. No complaints.    Vital Signs Last 24 Hrs  T(C): 36.7 (08 Jul 2021 11:11), Max: 37.5 (07 Jul 2021 12:58)  T(F): 98.1 (08 Jul 2021 11:11), Max: 99.5 (07 Jul 2021 12:58)  HR: 60 (08 Jul 2021 11:11) (60 - 95)  BP: 106/64 (08 Jul 2021 11:11) (106/64 - 146/76)  BP(mean): --  RR: 18 (08 Jul 2021 11:11) (17 - 18)  SpO2: 97% (08 Jul 2021 11:11) (94% - 97%)              CAPILLARY BLOOD GLUCOSE                  acetaminophen   Tablet .. 650 milliGRAM(s) Oral every 6 hours PRN  acetaminophen   Tablet .. 650 milliGRAM(s) Oral every 6 hours PRN  ammonium lactate 12% Lotion 1 Application(s) Topical two times a day  cholecalciferol 4000 Unit(s) Oral daily  coronavirus (EUA) Vaccine (PFIZER) 0.3 milliLiter(s) IntraMuscular once  digoxin     Tablet 250 MICROGram(s) Oral daily  diltiazem    milliGRAM(s) Oral daily  folic acid 1 milliGRAM(s) Oral daily  LORazepam   Injectable 1 milliGRAM(s) IV Push every 2 hours PRN  LORazepam   Injectable 1 milliGRAM(s) IV Push every 1 hour PRN  melatonin 5 milliGRAM(s) Oral at bedtime  metoprolol tartrate 100 milliGRAM(s) Oral two times a day  multivitamin 1 Tablet(s) Oral daily  nystatin Powder 1 Application(s) Topical every 12 hours  OLANZapine Injectable 2.5 milliGRAM(s) IntraMuscular every 6 hours PRN  rivaroxaban 20 milliGRAM(s) Oral with dinner               REVIEW OF SYSTEMS:  CONSTITUTIONAL: No fever, no weight loss, or no fatigue  NECK: No pain, no stiffness  RESPIRATORY: No cough, no wheezing, no chills, no hemoptysis, No shortness of breath  CARDIOVASCULAR: No chest pain, no palpitations, no dizziness, no leg swelling  GASTROINTESTINAL: No abdominal pain. No nausea, no vomiting, no hematemesis; No diarrhea, no constipation. No melena, no hematochezia.  GENITOURINARY: No dysuria, no frequency, no hematuria, no incontinence  NEUROLOGICAL: No headaches, no loss of strength, no numbness, no tremors  SKIN: No itching, no burning  MUSCULOSKELETAL: No joint pain, no swelling; No muscle, no back, no extremity pain  PSYCHIATRIC: No depression, no mood swings,   HEME/LYMPH: No easy bruising, no bleeding gums  ALLERY AND IMMUNOLOGIC: No hives       Consultant(s) Notes Reviewed:  [X] YES  [ ] NO    PHYSICAL EXAM:  GENERAL: NAD  HEAD:  Atraumatic, Normocephalic  EYES: EOMI, PERRLA, conjunctiva and sclera clear  ENMT: No tonsillar erythema, exudates, or enlargement; Moist mucous membranes  NECK: Supple, No JVD  NERVOUS SYSTEM:  Awake & alert  CHEST/LUNG: Clear to auscultation bilaterally; No rales, rhonchi, wheezing,  HEART: Regular rate and rhythm  ABDOMEN: Soft, Nontender, Nondistended; Bowel sounds present  EXTREMITIES:  No clubbing, cyanosis, or edema  LYMPH: No lymphadenopathy noted  SKIN: No rashes      Advanced care planning discussed with patient/family [X] YES   [ ] NO    Advanced care planning discussed with patient/family. Patient's health status was discussed. All appropriate changes have been made regarding patient's end-of-life care. Advanced care planning forms reviewed/discussed/completed.  20 minutes spent.

## 2021-07-08 NOTE — PROGRESS NOTE ADULT - ASSESSMENT
Assessment :  68 year old female with a history of HTN, HL, DVT, lymphedema, hemochromatosis admitted on 6/24/21 with rapid atrial fibrillation and UTI completed 3 days course of Rocephin. Course in hospital complicated by ETOH withdrawal.  Mental status improving  Sp fever  Cultures NGTD  CT chest neg for pneumonia + atelectasis    Noted to have persistent low grade fevers. Infectious w/u and imaging w/o foci of infection.     Plan :   C/w monitoring off Abx  Trend temps and cbc  Asp precautions  Pulm toileting    Covering Dr. Gamez through 7/14  Infectious Diseases will continue to follow. Please call with any questions.   Laura Andrews M.D.  [facility], Division of Infectious Diseases [phone]  For over the weekend and after hours, please call [phone]      
Assessment :  68 year old female with a history of HTN, HL, DVT, lymphedema, hemochromatosis admitted on 6/24/21 with rapid atrial fibrillation and UTI completed 3 days course of Rocephin. Course in hospital complicated by ETOH withdrawal.  Mental status improving  Sp fever  Cultures NGTD  CT chest neg for pneumonia + atelectasis    Noted to have persistent low grade fevers. Infectious w/u and imaging w/o foci of infection.     Plan :   C/w monitoring off Abx  Trend temps and cbc--low grade fevers, no focal complaints suggestive of infection  Asp precautions  Pulm toileting    Covering Dr. Gamez through 7/14  Infectious Diseases will continue to follow. Please call with any questions.   Laura Andrews M.D.  [facility], Division of Infectious Diseases [phone]  For over the weekend and after hours, please call [phone]      
Febrile overnight, on Rocephin, cx and work up in progress, may need additional imaging - CT abd and chest  Psych f/u noted, CM notes reviewed, multiple empty bottles in the apartment  cont curr low dose Ativan for w/d sx -   HEME Onc eval noted - Hemochromatosis ruled out as cause of AMS    68 year old female with a history of HTN, HL, DVT, lymphedema, hemochromatosis who was sent in from PMD with rapid atrial fibrillation.  AMS - confusion - ? etiol  ? LOUANN - etoh use disorder - prn ativan and ciwa - and Ativan Low dose ATC -   Ammonia and VBG noted  pt may have BINH - untreated  pt has Hx of Hemochromatosis - untreated and does not follow up regularly - known to Dr Kitchen -   spoke with HCP - emergency contact - best friend - listed under contacts  supportive measures  pt is on full dose AC - risk for fall    on [facility] rx regimen  work up in progress  
HEME Onc eval noted - Hemochromatosis ruled out as cause of AMS    68 year old female with a history of HTN, HL, DVT, lymphedema, hemochromatosis who was sent in from PMD with rapid atrial fibrillation.  AMS - confusion - ? etiol  ? LOUANN - etoh use disorder - prn ativan and ciwa - and Ativan Low dose ATC -   Ammonia and VBG noted  pt may have BINH - untreated  pt has Hx of Hemochromatosis - untreated and does not follow up regularly - known to Dr Kitchen -   spoke with HCP - emergency contact - best friend - listed under contacts  supportive measures  pt is on full dose AC - risk for fall    on [facility] rx regimen  work up in progress
The patient is a 68 year old female with a history of HTN, HL, DVT, lymphedema, hemochromatosis who was sent in from [facility] with rapid atrial fibrillation.     Plan:  - Echo with normal LV systolic function, moderate TR  - Continue diltiazem  mg daily  - Continue metoprolol tartrate 100 mg bid  - Continue digoxin 0.25 mg PO daily  - Continue rivaroxaban 20 mg daily  - ID follow-up
The patient is a 68 year old female with a history of HTN, HL, DVT, lymphedema, hemochromatosis who was sent in from [facility] with rapid atrial fibrillation.     Plan:  - Continue diltiazem  mg daily  - Continue metoprolol tartrate 100 mg bid  - Continue diltiazem drip 10 mg/hr - will attempt to wean off today  - Give digoxin 0.5 mg IVP. May need additional digoxin.  - Continue rivaroxaban 20 mg daily  - Hold other antihypertensives  - Echo with normal LV systolic function, moderate TR  - Replete K and Mg
The patient is a 68 year old female with a history of HTN, HL, DVT, lymphedema, hemochromatosis who was sent in from [facility] with rapid atrial fibrillation.     Plan:  - Echo with normal LV systolic function, moderate TR  - Heart rates being driven in part by underlying neurologic/psychiatric/infectious issues  - Continue diltiazem  mg daily  - Continue metoprolol tartrate 100 mg bid  - Discontinue diltiazem drip  - Give digoxin 0.25 mg IVP and start digoxin 0.25 mg PO daily  - Continue rivaroxaban 20 mg daily  - Hold other antihypertensives  - Infectious work-up
The patient is a 68 year old female with a history of HTN, HL, DVT, lymphedema, hemochromatosis who was sent in from PMD with rapid atrial fibrillation.     Plan:  - Cardiac enzymes negative  - BNP not significantly elevated at 869  - TSH normal  - Discontinue diltiazem drip  - Start diltiazem  mg daily  - Transition metoprolol tartrate to 50 mg bid  - Continue rivaroxaban 20 mg daily  - Hold other antihypertensives  - Check echo
The patient is a 68 year old female with a history of HTN, HL, DVT, lymphedema, hemochromatosis who was sent in from PMD with rapid atrial fibrillation.     Plan:  - Echo with normal LV systolic function, moderate TR  - Continue diltiazem  mg daily  - Continue metoprolol tartrate 100 mg bid  - Continue digoxin 0.25 mg PO daily  - Continue rivaroxaban 20 mg daily  - Discharge planning
The patient is a 68 year old female with a history of HTN, HL, DVT, lymphedema, hemochromatosis who was sent in from [facility] with rapid atrial fibrillation.     Plan:  - Echo with normal LV systolic function, moderate TR  - Continue diltiazem  mg daily  - Continue metoprolol tartrate 100 mg bid  - Continue digoxin 0.25 mg PO daily  - If patient becomes more bradycardic will discontinue digoxin  - Continue rivaroxaban 20 mg daily  - Infectious work-up  - Off of antibiotics although continues to have fevers
afebrile - on RA - vs noted -  - monitored off ABX  rheum markers neg so far ...  HEME Onc eval noted - Hemochromatosis ruled out as cause of AMS    68 year old female with a history of HTN, HL, DVT, lymphedema, hemochromatosis who was sent in from PMD with rapid atrial fibrillation.  AMS - confusion - ? etiol  ? LOUANN - etoh use disorder - prn ativan and ciwa -   Ammonia and VBG noted  pt may have BINH - untreated  pt has Hx of Hemochromatosis - untreated and does not follow up regularly - known to Dr Kitchen -   spoke with HCP - emergency contact - best friend - listed under contacts  supportive measures  on [facility] rx regimen  
low grade temp documented, on RA - vs noted -   monitored off ABX  rheum markers neg so far ...  HEME Onc eval noted - Hemochromatosis ruled out as cause of AMS    68 year old female with a history of HTN, HL, DVT, lymphedema, hemochromatosis who was sent in from PMD with rapid atrial fibrillation.  AMS - confusion - ? etiol  ? LOUANN - etoh use disorder - prn ativan and ciwa -   Ammonia and VBG noted  pt may have BINH - untreated  pt has Hx of Hemochromatosis - untreated and does not follow up regularly - known to Dr Kitchen -   spoke with HCP - emergency contact - best friend - listed under contacts  supportive measures  on [facility] rx regimen  
Assessment :  68 year old female with a history of HTN, HL, DVT, lymphedema, hemochromatosis admitted on 6/24/21 with rapid atrial fibrillation and UTI completed 3 days course of Rocephin. Course in hospital complicated by ETOH withdrawal.  Mental status improving  Sp fever  Cultures NGTD  CT chest neg for pneumonia + atelectasis  Noted to have persistent low grade fevers--resolved Infectious w/u and imaging w/o foci of infection.     Plan :   C/w monitoring off Abx  Trend temps and cbc  Asp precautions  Pulm toileting    Dispo planning per primary team    Covering Dr. Gamez through 7/14  Infectious Diseases will sign off at this time. Please call with any questions.   Laura Andrews M.D.  [facility], Division of Infectious Diseases [phone]  For over the weekend and after hours, please call [phone]      
Plan for [facility]  Psych f/u noted  afebrile - on RA - vs noted -  - monitored off ABX  rheum markers neg so far ...  HEME Onc eval noted - Hemochromatosis ruled out as cause of AMS as per HEME    68 year old female with a history of HTN, HL, DVT, lymphedema, hemochromatosis who was sent in from PMD with rapid atrial fibrillation.  AMS - confusion - ? etiol - ? LOUANN - etoh use disorder - prn ativan and ciwa -   Ammonia and VBG noted  pt may have BINH - untreated  pt has Hx of Hemochromatosis - untreated and does not follow up regularly - known to Dr Kitchen -   spoke with HCP Tamiko - emergency contact - best friend - listed under contacts  supportive measures  on [facility] rx regimen  
The patient is a 68 year old female with a history of HTN, HL, DVT, lymphedema, hemochromatosis who was sent in from [facility]D with rapid atrial fibrillation.     Plan:  - Echo with normal LV systolic function, moderate TR  - Continue diltiazem  mg daily  - Continue metoprolol tartrate 100 mg bid  - Continue digoxin 0.25 mg PO daily  - Continue rivaroxaban 20 mg daily  - Discharge planning
The patient is a 68 year old female with a history of HTN, HL, DVT, lymphedema, hemochromatosis who was sent in from PMD with rapid atrial fibrillation.     Plan:  - Echo with normal LV systolic function, moderate TR  - Heart rates being driven in part by underlying neurologic/psychiatric/infectious issues  - Continue diltiazem  mg daily  - Continue metoprolol tartrate 100 mg bid  - Continue digoxin 0.25 mg PO daily  - Continue rivaroxaban 20 mg daily  - Infectious work-up
70 yo woman with history of Hereditary Hemochromatosis previously on phlebotomy program. Has not been seen in 3 years with previous control of ferritin with last ferritin 11/14/2018  = 30  Presented to hospital on 6/24/21with  altered mental status of uncertain etiology ?related to alcohol withdrawal or recurrence of encephalitis    - these symptoms are not related to her underlying hemochromatosis with repeat ferritin 168  - continue neuro evaluation  - would hold phlebotomy at this point and re-evaluate when clinically more stable and able to return to office  - will sign off; reconsult if needed  - case discussed with Dr. Adan; to reconsult if needed  
Assessment :  68 year old female with a history of HTN, HL, DVT, lymphedema, hemochromatosis admitted on 6/24/21 with rapid atrial fibrillation and UTI completed 3 days course of Rocephin. Course in hospital complicated by ETOH withdrawal.  Mental status improving  Sp fever  Cultures NGTD  CT chest neg for pneumonia + atelectasis    Noted to have persistent low grade fevers. Infectious w/u and imaging w/o foci of infection.     Plan :   C/w monitoring off Abx  Trend temps and cbc  Asp precautions  Pulm toileting    Covering Dr. Gamez through 7/14  Infectious Diseases will continue to follow. Please call with any questions.   Laura Andrews M.D.  [facility], Division of Infectious Diseases [phone]  For over the weekend and after hours, please call [phone]      
Ativan PRN available as per ciwa triggers, no evidence of LOUANN -   HEME Onc eval noted - Hemochromatosis ruled out as cause of AMS    68 year old female with a history of HTN, HL, DVT, lymphedema, hemochromatosis who was sent in from PMD with rapid atrial fibrillation.  AMS - confusion - ? etiol  ? LOUANN - etoh use disorder - prn ativan and ciwa -   Ammonia and VBG noted  pt may have BINH - untreated  pt has Hx of Hemochromatosis - untreated and does not follow up regularly - known to Dr Kitchen -   spoke with HCP - emergency contact - best friend - listed under contacts  supportive measures  on [facility] rx regimen  
The patient is a 68 year old female with a history of HTN, HL, DVT, lymphedema, hemochromatosis who was sent in from [facility] with rapid atrial fibrillation.     Plan:  - Echo with normal LV systolic function, moderate TR  - Continue diltiazem  mg daily  - Continue metoprolol tartrate 100 mg bid  - Continue digoxin 0.25 mg PO daily  - Continue rivaroxaban 20 mg daily  - Infectious work-up  - On zosyn
The patient is a 68 year old female with a history of HTN, HL, DVT, lymphedema, hemochromatosis who was sent in from PMD with rapid atrial fibrillation.     Plan:  - Tachycardic overnight - likely due to diltiazem drip being weaned off and confusion/agitation  - Increase diltiazem CD to 240 mg daily  - Continue metoprolol tartrate 100 mg bid  - If tachycardic, can give intermittent metoprolol or diltiazem IV  - Continue rivaroxaban 20 mg daily  - Hold other antihypertensives  - Echo pending  - Await K/Mg and replete as needed  - May need psych eval
low grade temp documented, on RA - vs noted -   rheum markers neg so far ...  HEME Onc eval noted - Hemochromatosis ruled out as cause of AMS    68 year old female with a history of HTN, HL, DVT, lymphedema, hemochromatosis who was sent in from PMD with rapid atrial fibrillation.  AMS - confusion - ? etiol  ? LOUANN - etoh use disorder - prn ativan and ciwa -   Ammonia and VBG noted  pt may have BINH - untreated  pt has Hx of Hemochromatosis - untreated and does not follow up regularly - known to Dr Kitchen -   spoke with HCP - emergency contact - best friend - listed under contacts  supportive measures  on [facility] rx regimen    
low grade temp overnight - monitored off ABX   Psych f/u noted, CM notes reviewed, multiple empty bottles in the apartment  will taper Ativan IV this am   HEME Onc eval noted - Hemochromatosis ruled out as cause of AMS    68 year old female with a history of HTN, HL, DVT, lymphedema, hemochromatosis who was sent in from PMD with rapid atrial fibrillation.  AMS - confusion - ? etiol  ? LOUANN - etoh use disorder - prn ativan and ciwa - and Ativan Low dose ATC -   Ammonia and VBG noted  pt may have BINH - untreated  pt has Hx of Hemochromatosis - untreated and does not follow up regularly - known to Dr Kitchen -   spoke with HCP - emergency contact - best friend - listed under contacts  supportive measures  pt is on full dose AC - risk for fall    on [facility] rx regimen    
off Ativan  ID eval in progress  afebrile overnight  HEME Onc eval noted - Hemochromatosis ruled out as cause of AMS    68 year old female with a history of HTN, HL, DVT, lymphedema, hemochromatosis who was sent in from PMD with rapid atrial fibrillation.  AMS - confusion - ? etiol  ? LOUANN - etoh use disorder - prn ativan and ciwa -   Ammonia and VBG noted  pt may have BINH - untreated  pt has Hx of Hemochromatosis - untreated and does not follow up regularly - known to Dr Kitchen -   spoke with HCP - emergency contact - best friend - listed under contacts  supportive measures  on [facility] rx regimen
remains on low dose Ativan for LOUANN    ID eval noted - remains febrile - vs noted - CT chest - neg for PNA - on Zosyn ABX at present  ID w/u in progress  HEME Onc eval noted - Hemochromatosis ruled out as cause of AMS    68 year old female with a history of HTN, HL, DVT, lymphedema, hemochromatosis who was sent in from PMD with rapid atrial fibrillation.  AMS - confusion - ? etiol  ? LOUANN - etoh use disorder - prn ativan and ciwa - and Ativan Low dose ATC -   Ammonia and VBG noted  pt may have BINH - untreated  pt has Hx of Hemochromatosis - untreated and does not follow up regularly - known to Dr Kitchen -   spoke with HCP - emergency contact - best friend - listed under contacts  supportive measures  pt is on full dose AC - risk for fall    on [facility] rx regimen  
The patient is a 68 year old female with a history of HTN, HL, DVT, lymphedema, hemochromatosis who was sent in from [facility] with rapid atrial fibrillation.     Plan:  - Echo with normal LV systolic function, moderate TR  - Intermittently tachycardic at times  - Continue diltiazem  mg daily  - Continue metoprolol tartrate 100 mg bid  - Continue digoxin 0.25 mg PO daily  - Continue rivaroxaban 20 mg daily  - Infectious work-up  - On zosyn
The patient is a 68 year old female with a history of HTN, HL, DVT, lymphedema, hemochromatosis who was sent in from PMD with rapid atrial fibrillation.     7/4/21  No complaints    Plan:  - Echo with normal LV systolic function, moderate TR  - Continue diltiazem  mg daily  - Continue metoprolol tartrate 100 mg bid  - Continue digoxin 0.25 mg PO daily  - Continue rivaroxaban 20 mg daily  - Off zosyn  - Seen by Psychiatry, ID, Pulmonary, Neurology, Endorinology
The patient is a 68 year old female with a history of HTN, HL, DVT, lymphedema, hemochromatosis who was sent in from PMD with rapid atrial fibrillation.     Plan:  - Remains tachycardic - agitation contributing  - Increase diltiazem CD to 360 mg daily  - Continue metoprolol tartrate 100 mg bid  - Restart diltiazem drip at 10 mg/hr  - Continue rivaroxaban 20 mg daily  - Hold other antihypertensives  - Echo with normal LV systolic function, moderate TR  - Replete K  - Needs further psychiatric care
The patient is a 68 year old female with a history of HTN, HL, DVT, lymphedema, hemochromatosis who was sent in from [facility]D with rapid atrial fibrillation.     7/3/21  No complaints  Feeling better  Monitor-A-Fib with controlled rate    Plan:  - Echo with normal LV systolic function, moderate TR-see above report  - Continue diltiazem  mg daily  - Continue metoprolol tartrate 100 mg bid  - Continue digoxin 0.25 mg PO daily  - Continue rivaroxaban 20 mg daily  - Off zosyn  - Seen by Psychiatry, ID, Pulmonary, Neurology, Endorinology
afebrile - on RA - vs noted -  - monitored off ABX  rheum markers neg so far ...  HEME Onc eval noted - Hemochromatosis ruled out as cause of AMS as per HEME    68 year old female with a history of HTN, HL, DVT, lymphedema, hemochromatosis who was sent in from PMD with rapid atrial fibrillation.  AMS - confusion - ? etiol  ? LOUANN - etoh use disorder - prn ativan and ciwa -   Ammonia and VBG noted  pt may have BINH - untreated  pt has Hx of Hemochromatosis - untreated and does not follow up regularly - known to Dr Kitchen -   spoke with HCP - emergency contact - best friend - listed under contacts  supportive measures  on [facility] rx regimen

## 2021-07-08 NOTE — DISCHARGE NOTE NURSING/CASE MANAGEMENT/SOCIAL WORK - PATIENT PORTAL LINK FT
You can access the FollowMyHealth Patient Portal offered by [facility] by registering at the following website: [email or web address]. By joining [facility]’s FollowMyHealth portal, you will also be able to view your health information using other applications (apps) compatible with our system.

## 2021-07-08 NOTE — PROGRESS NOTE ADULT - PROBLEM SELECTOR PROBLEM 2
Altered mental status

## 2021-07-08 NOTE — PROGRESS NOTE ADULT - NSPROGADDITIONALINFOA_GEN_ALL_CORE
Spoke to HCP
Spoke to HCP  D/C planning to [facility] today
Spoke to HCP

## 2021-07-08 NOTE — PROGRESS NOTE ADULT - PROBLEM SELECTOR PROBLEM 9
Hemochromatosis

## 2021-07-08 NOTE — PROGRESS NOTE ADULT - SUBJECTIVE AND OBJECTIVE BOX
Chief Complaint: Tachycardia    Interval Events: No events overnight.    Review of Systems:  General: No fevers, chills, weight loss or gain  Skin: No rashes, color changes  Cardiovascular: No chest pain, orthopnea  Respiratory: No shortness of breath, cough  Gastrointestinal: No nausea, abdominal pain  Genitourinary: No incontinence, pain with urination  Musculoskeletal: No pain, swelling, decreased range of motion  Neurological: No headache, weakness  Psychiatric: No depression, anxiety  Endocrine: No weight loss or gain, increased thirst  All other systems are comprehensively negative.    Physical Exam:  Vital Signs Last 24 Hrs  T(C): 36.9 (08 Jul 2021 05:02), Max: 37.5 (07 Jul 2021 12:58)  T(F): 98.4 (08 Jul 2021 05:02), Max: 99.5 (07 Jul 2021 12:58)  HR: 80 (08 Jul 2021 05:02) (63 - 95)  BP: 146/76 (08 Jul 2021 05:02) (108/66 - 146/76)  BP(mean): --  RR: 17 (08 Jul 2021 05:02) (17 - 17)  SpO2: 94% (08 Jul 2021 05:02) (94% - 96%)  General: NAD  HEENT: MMM  Neck: No JVD, no carotid bruit  Lungs: CTAB  CV: Irregular, nl S1/S2, no M/R/G  Abdomen: S/NT/ND, +BS  Extremities: No LE edema, no cyanosis  Neuro: AAOx3, non-focal  Skin: No rash    Labs:

## 2021-07-08 NOTE — PROGRESS NOTE ADULT - PROBLEM SELECTOR PLAN 9
Serologies noted  Heme consult noted

## 2021-07-08 NOTE — PROGRESS NOTE ADULT - SUBJECTIVE AND OBJECTIVE BOX
Neurology follow up note    JOSAFAT [id number]      Interval History:    Patient feels ok no new complaints.    MEDICATIONS    acetaminophen   Tablet .. 650 milliGRAM(s) Oral every 6 hours PRN  acetaminophen   Tablet .. 650 milliGRAM(s) Oral every 6 hours PRN  ammonium lactate 12% Lotion 1 Application(s) Topical two times a day  cholecalciferol 4000 Unit(s) Oral daily  digoxin     Tablet 250 MICROGram(s) Oral daily  diltiazem    milliGRAM(s) Oral daily  folic acid 1 milliGRAM(s) Oral daily  LORazepam   Injectable 1 milliGRAM(s) IV Push every 2 hours PRN  LORazepam   Injectable 1 milliGRAM(s) IV Push every 1 hour PRN  melatonin 5 milliGRAM(s) Oral at bedtime  metoprolol tartrate 100 milliGRAM(s) Oral two times a day  multivitamin 1 Tablet(s) Oral daily  nystatin Powder 1 Application(s) Topical every 12 hours  OLANZapine Injectable 2.5 milliGRAM(s) IntraMuscular every 6 hours PRN  rivaroxaban 20 milliGRAM(s) Oral with dinner      Allergies    No Known Allergies    Intolerances            Vital Signs Last 24 Hrs  T(C): 36.9 (08 Jul 2021 05:02), Max: 37.5 (07 Jul 2021 12:58)  T(F): 98.4 (08 Jul 2021 05:02), Max: 99.5 (07 Jul 2021 12:58)  HR: 80 (08 Jul 2021 05:02) (63 - 95)  BP: 146/76 (08 Jul 2021 05:02) (108/66 - 146/76)  BP(mean): --  RR: 17 (08 Jul 2021 05:02) (17 - 17)  SpO2: 94% (08 Jul 2021 05:02) (94% - 96%)    REVIEW OF SYSTEMS:  Constitutional:  The patient denies fever, chills, or night sweats.  Head:  At present, no headaches or lightheadedness.  Eyes:  No double vision, but does have blurry vision, suspect she thinks she has macular degeneration.  Ears:  No ringing in ears.  Neck:  No neck pain.  Respiratory:  No shortness of breath.  Cardiovascular:  No chest pain.  Abdomen:  No nausea, vomiting, or abdominal pain.  Extremities/Neurological:  Has numbness and tingling of her feet, but states she has herniated disc in her back.  This is not new.  Musculoskeletal:  Occasional joint pain, possibly from arthritis.    PHYSICAL EXAMINATION:  HEENT:  Head:  Positive trauma was noted over the left eye.  No scleral icterus was noted.  Ears:  Hearing bilaterally intact.  NECK:  Supple.  RESPIRATORY:  Good air entry bilaterally.  CARDIOVASCULAR:  S1 and S2 heard.  ABDOMEN:  Soft and nontender.  EXTREMITIES:  No clubbing was noted.      NEUROLOGIC:  The patient is awake   Location was hospital, year was 2021,  She was able to follow simple  and complex commands  Extraocular movements were intact, full visual field.    Speech was fluent.  Smile was symmetric.  Motor:  Bilateral upper were 4/5, bilateral lower were 3-/5.   able to name objects                   LABS:            Hemoglobin A1C:       Vitamin B12         RADIOLOGY      ANALYSIS AND PLAN:  This is a 71-year-old with an episode of altered mental status.  Suspect patients AMS is possible from the history of hemochromatosis with oxidative stress and iron leading to a chronic neurodegenerative deterioration in overall cognitive status which unfortunately will be progressive. Questionable, the patient has an worsening mild cognitive impairment that is progressively becoming worse with hospital setting leading to the hospital delirium, possible metabolic encephalopathy.  antibiotics as needed   For history of atrial fibrillation, anticoagulation as needed.  For history of hypertension, monitor systolic blood pressure.  For history of hyperlipidemia, condition the patient on statin.  Fall precautions.  monitor oxygen and CO2 levels as needed  recommend Psychiatry follow-up in regards to control of symptoms.   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 she understands that she will deteriorate cognitive wise as time goes on will most likely need placement     Greater than 24 minutes of time was spent with the patient, plan of care, reviewing data, with greater than 50% of the visit was spent counseling and/or coordinating care with multidisciplinary healthcare team

## 2021-07-08 NOTE — PROGRESS NOTE ADULT - SUBJECTIVE AND OBJECTIVE BOX
[facility], Division of Infectious Diseases  HASMUKH Buchanan Y. Patel, S. Shah  [phone]    Name: JOSAFAT CLEMENTE  Age: 71y  Gender: Female  MRN: [id number]    Interval History:  Patient seen and examined at bedside this morning  No acute overnight events. Afebrile.   Eating breakfast. Comfortable  Notes reviewed. Planned for VICTOR MANUEL    Antibiotics:      Medications:  acetaminophen   Tablet .. 650 milliGRAM(s) Oral every 6 hours PRN  acetaminophen   Tablet .. 650 milliGRAM(s) Oral every 6 hours PRN  ammonium lactate 12% Lotion 1 Application(s) Topical two times a day  cholecalciferol 4000 Unit(s) Oral daily  digoxin     Tablet 250 MICROGram(s) Oral daily  diltiazem    milliGRAM(s) Oral daily  folic acid 1 milliGRAM(s) Oral daily  LORazepam   Injectable 1 milliGRAM(s) IV Push every 2 hours PRN  LORazepam   Injectable 1 milliGRAM(s) IV Push every 1 hour PRN  melatonin 5 milliGRAM(s) Oral at bedtime  metoprolol tartrate 100 milliGRAM(s) Oral two times a day  multivitamin 1 Tablet(s) Oral daily  nystatin Powder 1 Application(s) Topical every 12 hours  OLANZapine Injectable 2.5 milliGRAM(s) IntraMuscular every 6 hours PRN  rivaroxaban 20 milliGRAM(s) Oral with dinner      Review of Systems:  A 10-point review of systems was obtained.    Review of systems otherwise negative except as previously noted.    Allergies: No Known Allergies    For details regarding the patient's past medical history, social history, family history, and other miscellaneous elements, please refer the initial infectious diseases consultation and/or the admitting history and physical examination for this admission.    Objective:  Vitals:   Vital Signs Last 24 Hrs  T(C): 36.9 (08 Jul 2021 05:02), Max: 37.5 (07 Jul 2021 12:58)  T(F): 98.4 (08 Jul 2021 05:02), Max: 99.5 (07 Jul 2021 12:58)  HR: 80 (08 Jul 2021 05:02) (63 - 95)  BP: 146/76 (08 Jul 2021 05:02) (108/66 - 146/76)  BP(mean): --  RR: 17 (08 Jul 2021 05:02) (17 - 17)  SpO2: 94% (08 Jul 2021 05:02) (94% - 96%)    Physical Examination:  General: no acute distress  HEENT: NC/AT, EOMI, anicteric, no oral lesions  Neck: supple, no palpable LAD  Cardio: S1, S2 heard, RRR, no murmurs  Resp: breath sounds heard bilaterally, no rales, wheezes or rhonchi  Abd: soft, NT, ND, + bowel sounds  Ext: no edema or cyanosis  Skin: warm, dry, no visible rash      Laboratory Studies: reviewed      Microbiology: reviewed    Culture - Urine (collected 06-29-21 @ 15:06)  Source: .Urine Clean Catch (Midstream)  Final Report (06-30-21 @ 12:56):    No growth    Culture - Blood (collected 06-29-21 @ 12:13)  Source: .Blood Blood  Final Report (07-04-21 @ 13:01):    No Growth Final    Culture - Blood (collected 06-29-21 @ 12:13)  Source: .Blood Blood  Final Report (07-04-21 @ 13:01):    No Growth Final    Culture - Urine (collected 06-25-21 @ 00:40)  Source: .Urine Clean Catch (Midstream)  Final Report (06-27-21 @ 09:14):    >100,000 CFU/ml Klebsiella pneumoniae    Normal Urogenital jignesh present  Organism: Klebsiella pneumoniae (06-27-21 @ 09:14)  Organism: Klebsiella pneumoniae (06-27-21 @ 09:14)      -  Amikacin: S <=16      -  Amoxicillin/Clavulanic Acid: S <=8/4      -  Ampicillin: R 16 These ampicillin results predict results for amoxicillin      -  Ampicillin/Sulbactam: S <=4/2 Enterobacter, Citrobacter, and Serratia may develop resistance during prolonged therapy (3-4 days)      -  Aztreonam: S <=4      -  Cefazolin: S <=2 (MIC_CL_COM_ENTERIC_CEFAZU) For uncomplicated UTI with K. pneumoniae, E. coli, or P. mirablis: MICHELLE <=16 is sensitive and MICHELLE >=32 is resistant. This also predicts results for oral agents cefaclor, cefdinir, cefpodoxime, cefprozil, cefuroxime axetil, cephalexin and locarbef for uncomplicated UTI. Note that some isolates may be susceptible to these agents while testing resistant to cefazolin.      -  Cefepime: S <=2      -  Cefoxitin: S <=8      -  Ceftriaxone: S <=1 Enterobacter, Citrobacter, and Serratia may develop resistance during prolonged therapy      -  Ciprofloxacin: S <=0.25      -  Ertapenem: S <=0.5      -  Gentamicin: S <=2      -  Imipenem: I 2      -  Levofloxacin: S <=0.5      -  Meropenem: S <=1      -  Nitrofurantoin: I 64 Should not be used to treat pyelonephritis      -  Piperacillin/Tazobactam: S <=8      -  Tigecycline: S <=2      -  Tobramycin: S <=2      -  Trimethoprim/Sulfamethoxazole: S <=0.5/9.5      Method Type: MICHELLE        Radiology: reviewed

## 2021-07-08 NOTE — DISCHARGE NOTE NURSING/CASE MANAGEMENT/SOCIAL WORK - NSDCVIVACCINE_GEN_ALL_CORE_FT
COVID-19, mRNA, LNP-S, PF, 30 mcg/0.3 mL dose (Pf[employer]); 08-Jul-2021 12:55; aRchel Quinn (RN); [employer];  (Exp. Date: 08-Jul-2021); IntraMuscular; Deltoid Left.; 0.3 milliLiter(s);

## 2021-07-08 NOTE — PROGRESS NOTE ADULT - PROBLEM SELECTOR PROBLEM 1
Rapid atrial fibrillation
Secondary hyperparathyroidism
Rapid atrial fibrillation

## 2021-07-08 NOTE — PROGRESS NOTE ADULT - REASON FOR ADMISSION
Dizzy

## 2021-07-08 NOTE — PROGRESS NOTE ADULT - PROBLEM SELECTOR PLAN 2
Possibly secondary to Etoh withdrawal, UTI, neurodegenerative disorder from hemochromatosis  Resolved -- now back to baseline  Neuro/Psych f/u  Detox consult noted  Finished [facility] protocol

## 2021-07-08 NOTE — PROGRESS NOTE ADULT - PROVIDER SPECIALTY LIST ADULT
Addiction Medicine
Addiction Medicine
Cardiology
Infectious Disease
Infectious Disease
Neurology
Pulmonology
Cardiology
Heme/Onc
Neurology
Pulmonology
Addiction Medicine
Cardiology
Infectious Disease
Neurology
Cardiology
Infectious Disease
Infectious Disease
Neurology
Neurology
Addiction Medicine
Endocrinology
Infectious Disease
Pulmonology
Pulmonology
Internal Medicine

## 2021-07-08 NOTE — PROGRESS NOTE ADULT - SUBJECTIVE AND OBJECTIVE BOX
Date/Time Patient Seen:  		  Referring MD:   Data Reviewed	       Patient is a 71y old  Female who presents with a chief complaint of Dizzy (07 Jul 2021 12:40)      Subjective/HPI     PAST MEDICAL & SURGICAL HISTORY:  HTN (hypertension), benign    CHF (congestive heart failure)    Pulmonary embolism    HLD (hyperlipidemia)    HTN (hypertension)    Hemochromatosis    S/P D&amp;C (status post dilation and curettage)  with IUD insertion in 2013    No significant past surgical history          Medication list         MEDICATIONS  (STANDING):  ammonium lactate 12% Lotion 1 Application(s) Topical two times a day  cholecalciferol 4000 Unit(s) Oral daily  digoxin     Tablet 250 MICROGram(s) Oral daily  diltiazem    milliGRAM(s) Oral daily  folic acid 1 milliGRAM(s) Oral daily  melatonin 5 milliGRAM(s) Oral at bedtime  metoprolol tartrate 100 milliGRAM(s) Oral two times a day  multivitamin 1 Tablet(s) Oral daily  nystatin Powder 1 Application(s) Topical every 12 hours  rivaroxaban 20 milliGRAM(s) Oral with dinner    MEDICATIONS  (PRN):  acetaminophen   Tablet .. 650 milliGRAM(s) Oral every 6 hours PRN Temp greater or equal to 38C (100.4F)  acetaminophen   Tablet .. 650 milliGRAM(s) Oral every 6 hours PRN Mild Pain (1 - 3)  LORazepam   Injectable 1 milliGRAM(s) IV Push every 2 hours PRN CIWA-Ar score increase by 2 points and a total score of 7 or less  LORazepam   Injectable 1 milliGRAM(s) IV Push every 1 hour PRN CIWA-Ar score 8 or greater  OLANZapine Injectable 2.5 milliGRAM(s) IntraMuscular every 6 hours PRN agitation         Vitals log        ICU Vital Signs Last 24 Hrs  T(C): 36.9 (08 Jul 2021 05:02), Max: 37.5 (07 Jul 2021 12:58)  T(F): 98.4 (08 Jul 2021 05:02), Max: 99.5 (07 Jul 2021 12:58)  HR: 80 (08 Jul 2021 05:02) (63 - 95)  BP: 146/76 (08 Jul 2021 05:02) (108/66 - 146/76)  BP(mean): --  ABP: --  ABP(mean): --  RR: 17 (08 Jul 2021 05:02) (17 - 17)  SpO2: 94% (08 Jul 2021 05:02) (94% - 96%)           Input and Output:  I&O's Detail    06 Jul 2021 07:01  -  07 Jul 2021 07:00  --------------------------------------------------------  IN:  Total IN: 0 mL    OUT:    Voided (mL): 1220 mL  Total OUT: 1220 mL    Total NET: -1220 mL      07 Jul 2021 07:01  -  08 Jul 2021 06:11  --------------------------------------------------------  IN:  Total IN: 0 mL    OUT:    Voided (mL): 900 mL  Total OUT: 900 mL    Total NET: -900 mL          Lab Data                        10.9   12.07 )-----------( 501      ( 06 Jul 2021 06:52 )             34.0     07-06    142  |  109<H>  |  15  ----------------------------<  112<H>  3.6   |  26  |  0.58    Ca    9.5      06 Jul 2021 06:52  Mg     1.9     07-06              Review of Systems	      Objective     Physical Examination    heart s1s2  lung dec BS  abd soft      Pertinent Lab findings & Imaging      Ino:  NO   Adequate UO     I&O's Detail    06 Jul 2021 07:01  -  07 Jul 2021 07:00  --------------------------------------------------------  IN:  Total IN: 0 mL    OUT:    Voided (mL): 1220 mL  Total OUT: 1220 mL    Total NET: -1220 mL      07 Jul 2021 07:01  -  08 Jul 2021 06:11  --------------------------------------------------------  IN:  Total IN: 0 mL    OUT:    Voided (mL): 900 mL  Total OUT: 900 mL    Total NET: -900 mL               Discussed with:     Cultures:	        Radiology

## 2021-07-27 NOTE — PROVIDER CONTACT NOTE (CRITICAL VALUE NOTIFICATION) - TEST AND RESULT REPORTED:
PT LAYING IN BED, A/OX4, ALL NEEDS IN REACH, CALL LIGHT IN REACH, NAD AT THIS 
TIME potassium level 2.5

## 2022-01-01 NOTE — PROGRESS NOTE ADULT - SUBJECTIVE AND OBJECTIVE BOX
"      ED Provider Note        CHIEF COMPLAINT  Chief Complaint   Patient presents with   • Fever   • Congestion       HPI  Govind Sterling is a 1 wk.o. female who presents to the Emergency Department for evaluation of fever.  Parents report that she developed congestion starting this morning and fevers starting this evening.  T-max at home was measured at 100.4 °F.  They note that several other members of the family have been sick with similar symptoms, but no fevers.  Baby is bottle-fed formula and has been eating well.  Mother notes that she was born at [facility] at 35 weeks and 3 days secondary to maternal preeclampsia.  They note that she did require CPAP after birth for a few hours, but no NICU stay and no other complications.  Mother denies any personal or known family history of herpes infection or cold sores.    REVIEW OF SYSTEMS  See HPI for further details.  All other systems reviewed and were negative.    PAST MEDICAL HISTORY  The patient has no chronic medical history. Vaccinations are up to date.  has a past medical history of Baby premature 35 weeks.    SURGICAL HISTORY  patient denies any surgical history    SOCIAL HISTORY  The patient was accompanied to the ED with her mother who she lives with.    CURRENT MEDICATIONS  Home Medications     Reviewed by Yudelka Novak (Pharmacy Tech) on 06/03/22 at 2011  Med List Status: Complete   Medication Last Dose Status        Patient Enrico Taking any Medications                       ALLERGIES  No Known Allergies    PHYSICAL EXAM  VITAL SIGNS: BP (!) 106/51   Pulse 174   Temp (!) 38.4 °C (101.1 °F) (Rectal)   Resp 60   Ht 0.483 m (1' 7\")   Wt 2.6 kg (5 lb 11.7 oz)   SpO2 94%   BMI 11.16 kg/m²     Constitutional: Alert in no apparent distress.   HENT: Normocephalic, Atraumatic, Bilateral external ears normal, nasal congestion present. Moist mucous membranes.  Anterior fontanelle open soft and flat  Eyes: Pupils are equal and reactive, Conjunctiva " "normal   Throat: Midline uvula, no exudate.  Neck: Normal range of motion, No tenderness, Supple, No stridor. No evidence of meningeal irritation.  Lymphatic: No lymphadenopathy noted.   Cardiovascular: Regular rate and rhythm, no murmurs.   Thorax & Lungs: Normal breath sounds, No respiratory distress, No wheezing.    Abdomen: Soft, No tenderness, No masses.  Skin: Warm, Dry, No erythema, No rash, No Petechiae.   Musculoskeletal: Good range of motion in all major joints. No tenderness to palpation or major deformities noted.   Neurologic: Alert, Normal motor function, Normal sensory function, No focal deficits noted.   Psychiatric: non-toxic in appearance and behavior.     LABS  Labs Reviewed   CBC WITH DIFFERENTIAL - Abnormal; Notable for the following components:       Result Value    WBC 16.4 (*)     RBC 5.01 (*)     Hemoglobin 17.2 (*)     MCV 98.4 (*)     MPV 9.8 (*)     Neutrophils-Polys 39.50 (*)     Lymphocytes 26.00 (*)     Monocytes 34.50 (*)     Monos (Absolute) 5.66 (*)     All other components within normal limits   COMP METABOLIC PANEL - Abnormal; Notable for the following components:    Potassium 5.8 (*)     Alkaline Phosphatase 223 (*)     All other components within normal limits   POC COV-2, FLU A/B, RSV BY PCR - Abnormal; Notable for the following components:    POC SARS-CoV-2, PCR DETECTED (*)     All other components within normal limits   CRP QUANTITIVE (NON-CARDIAC)   URINALYSIS    Narrative:     Indication for culture:->Child less than or equal to 14 years  of age   PROCALCITONIN   DIFFERENTIAL MANUAL   PERIPHERAL SMEAR REVIEW   PLATELET ESTIMATE   MORPHOLOGY   URINE CULTURE(NEW)    Narrative:     Indication for culture:->Child less than or equal to 14 years  of age   BLOOD CULTURE    Narrative:     Per Hospital Policy: Only change Specimen Src: to \"Line\" if  specified by physician order.   CSF CULTURE   CSF PROTEIN   CSF GLUCOSE   CSF CELL COUNT   POCT COV-2, FLU A/B, RSV BY PCR     All " labs reviewed by me.      LUMBAR PUNCTURE PROCEDURE NOTE  Patient identification was confirmed and consent was obtained. The procedure  was performed by Dr. Mancilla  Indication: evaluate for infection  Puncture Site: L4-L5 interspace  Sterile procedures observed  Patient position: left lateral decubitus   Needle size: 22G Spinal needle   Anesthetic used (type and amt): EMLA  Intracranial pressure: not obtained  Amount CSF collected: 3mL  Color of CSF collected: clear/yellow  Site anesthetized, puncture made at indicated site, CSF collected and sent for further lab testing (see lab ). Pt tolerated procedure well without complications.      COURSE & MEDICAL DECISION MAKING  Nursing notes, VS, PMSFHx reviewed in chart.    I verified that the patient was wearing a mask if appropriate for age, and I was wearing appropriate PPE every time I entered the room.     5:57 PM - Patient seen and examined at bedside.     Decision Making:  10-day-old baby girl presents emergency department for evaluation of fever.  On arrival here, the patient's temperature was elevated at 38.4 °C.  She had significant nasal congestion and feel the likely source of infection is a viral illness.  Given her age and the fact that she was also premature, felt that obtaining laboratory studies including serum, urine, and CSF was prudent.  I discussed the risks and benefits of this with the patient's parents and they expressed understanding.  They were comfortable with the plan of care.  Patient will be empirically started on ampicillin and cefepime.    HYDRATION: Based on the patient's presentation of Sepsis the patient was given IV fluids. IV Hydration was used because oral hydration was not adequate alone. Upon recheck following hydration, the patient was improving.     IV access was obtained and laboratory studies were drawn.  CBC showed a leukocytosis of 16.4 and likely hemoconcentration with a hemoglobin of 17.2.  Potassium was  elevated on CMP likely secondary to hemolysis.  There were no other significant abnormalities on CMP.  CRP and procalcitonin were not significantly elevated.  Urinalysis was not concerning for infection.  Viral testing was positive for detection of COVID-19 which is likely the cause of her symptoms.  Lumbar puncture was performed and cell count is still pending at this time.    Case was discussed with Dr. Padgett (pediatric hospitalist) who kindly agreed to evaluate the patient for hospitalization.  Parents were comfortable with the plan of care.  Please see the admission, progress, discharge notes for the ultimate disposition of this patient.      DISPOSITION:  Patient will be hospitalized by Dr. Padgett (pediatric hospitalist) in guarded condition.    FINAL IMPRESSION  1. COVID-19 virus infection    2.  fever                   Date/Time Patient Seen:  		  Referring MD:   Data Reviewed	       Patient is a 71y old  Female who presents with a chief complaint of Dizzy (04 Jul 2021 09:41)      Subjective/HPI     PAST MEDICAL & SURGICAL HISTORY:  HTN (hypertension), benign    CHF (congestive heart failure)    Pulmonary embolism    HLD (hyperlipidemia)    HTN (hypertension)    Hemochromatosis    S/P D&amp;C (status post dilation and curettage)  with IUD insertion in 2013    No significant past surgical history          Medication list         MEDICATIONS  (STANDING):  ammonium lactate 12% Lotion 1 Application(s) Topical two times a day  digoxin     Tablet 250 MICROGram(s) Oral daily  diltiazem    milliGRAM(s) Oral daily  folic acid 1 milliGRAM(s) Oral daily  melatonin 5 milliGRAM(s) Oral at bedtime  metoprolol tartrate 100 milliGRAM(s) Oral two times a day  multivitamin 1 Tablet(s) Oral daily  nystatin Powder 1 Application(s) Topical every 12 hours  rivaroxaban 20 milliGRAM(s) Oral with dinner    MEDICATIONS  (PRN):  acetaminophen   Tablet .. 650 milliGRAM(s) Oral every 6 hours PRN Temp greater or equal to 38C (100.4F)  acetaminophen   Tablet .. 650 milliGRAM(s) Oral every 6 hours PRN Mild Pain (1 - 3)  LORazepam   Injectable 1 milliGRAM(s) IV Push every 2 hours PRN CIWA-Ar score increase by 2 points and a total score of 7 or less  LORazepam   Injectable 1 milliGRAM(s) IV Push every 1 hour PRN CIWA-Ar score 8 or greater  OLANZapine Injectable 2.5 milliGRAM(s) IntraMuscular every 6 hours PRN agitation         Vitals log        ICU Vital Signs Last 24 Hrs  T(C): 36.4 (05 Jul 2021 05:12), Max: 38 (04 Jul 2021 19:31)  T(F): 97.6 (05 Jul 2021 05:12), Max: 100.4 (04 Jul 2021 19:31)  HR: 80 (05 Jul 2021 05:12) (60 - 80)  BP: 117/62 (05 Jul 2021 05:12) (110/72 - 120/77)  BP(mean): --  ABP: --  ABP(mean): --  RR: 19 (05 Jul 2021 05:12) (18 - 19)  SpO2: 100% (05 Jul 2021 05:12) (96% - 100%)           Input and Output:  I&O's Detail    03 Jul 2021 07:01  -  04 Jul 2021 07:00  --------------------------------------------------------  IN:    Oral Fluid: 300 mL  Total IN: 300 mL    OUT:    Voided (mL): 3300 mL  Total OUT: 3300 mL    Total NET: -3000 mL      04 Jul 2021 07:01  -  05 Jul 2021 05:55  --------------------------------------------------------  IN:    Oral Fluid: 670 mL  Total IN: 670 mL    OUT:    Voided (mL): 1550 mL  Total OUT: 1550 mL    Total NET: -880 mL          Lab Data                        11.2   12.06 )-----------( 354      ( 04 Jul 2021 08:00 )             33.7     07-04    142  |  109<H>  |  17  ----------------------------<  105<H>  3.5   |  25  |  0.64    Ca    9.3      04 Jul 2021 08:00  Mg     1.8     07-04              Review of Systems	      Objective     Physical Examination    heart s1s2  lung dec BS  abd soft  obese  on RA    Pertinent Lab findings & Imaging      Ino:  NO   Adequate UO     I&O's Detail    03 Jul 2021 07:01  -  04 Jul 2021 07:00  --------------------------------------------------------  IN:    Oral Fluid: 300 mL  Total IN: 300 mL    OUT:    Voided (mL): 3300 mL  Total OUT: 3300 mL    Total NET: -3000 mL      04 Jul 2021 07:01  -  05 Jul 2021 05:55  --------------------------------------------------------  IN:    Oral Fluid: 670 mL  Total IN: 670 mL    OUT:    Voided (mL): 1550 mL  Total OUT: 1550 mL    Total NET: -880 mL               Discussed with:     Cultures:	        Radiology

## 2023-05-02 NOTE — PROGRESS NOTE ADULT - PROBLEM/PLAN-8
atorvastatin (LIPITOR) 10 MG tablet 90 tablet 3 5/3/2022     Last visit 07/19/2022  Due back 01/19/2023  Medication refilled for #90 days per protocol.  Pt needs to schedule for further fills.   Sent message in [employer].      
DISPLAY PLAN FREE TEXT

## 2023-06-21 NOTE — BH CONSULTATION LIAISON ASSESSMENT NOTE - NSBHCHARTREVIEWLAB_PSY_A_CORE FT
Last office visit with Dr. Dsouza was on 3/15/22  Last Pap Smear: 3/15/22  Abnormal Pap follow-up ordered and completed? normal  Last Depo shot: 4/5/23  Patient is within time frame: yes    Is patient's BP >150/100? normal   If yes, discussed BP with Dr. Jones to give depo per .   If not, plan is to      Next Depo shot is due between: sep 6-20    Depo given IM in right arm. Patient tolerated injection well with no complaints.    Pt has no  questions/concerns today.     CCP was scheduled.     
                      12.8   10.63 )-----------( 221      ( 28 Jun 2021 07:25 )             38.8   06-28    143  |  105  |  7   ----------------------------<  123<H>  3.4<L>   |  28  |  0.58    Ca    8.6      28 Jun 2021 07:25  Phos  3.2     06-28  Mg     1.5     06-28    TPro  6.6  /  Alb  3.2<L>  /  TBili  0.7  /  DBili  .30<H>  /  AST  37  /  ALT  23  /  AlkPhos  97  06-26
